# Patient Record
Sex: FEMALE | Race: WHITE | NOT HISPANIC OR LATINO | Employment: UNEMPLOYED | URBAN - METROPOLITAN AREA
[De-identification: names, ages, dates, MRNs, and addresses within clinical notes are randomized per-mention and may not be internally consistent; named-entity substitution may affect disease eponyms.]

---

## 2017-01-13 ENCOUNTER — ALLSCRIPTS OFFICE VISIT (OUTPATIENT)
Dept: OTHER | Facility: OTHER | Age: 37
End: 2017-01-13

## 2017-01-18 LAB
HPV 18 (HISTORICAL): NOT DETECTED
HPV HIGH RISK 16/18 (HISTORICAL): NOT DETECTED
HPV16 (HISTORICAL): NOT DETECTED
PAP (HISTORICAL): NORMAL

## 2017-02-10 ENCOUNTER — OFFICE VISIT (OUTPATIENT)
Dept: URGENT CARE | Facility: MEDICAL CENTER | Age: 37
End: 2017-02-10
Payer: COMMERCIAL

## 2017-02-10 PROCEDURE — S9083 URGENT CARE CENTER GLOBAL: HCPCS

## 2017-02-10 PROCEDURE — G0382 LEV 3 HOSP TYPE B ED VISIT: HCPCS

## 2017-12-11 ENCOUNTER — APPOINTMENT (OUTPATIENT)
Dept: LAB | Facility: MEDICAL CENTER | Age: 37
End: 2017-12-11
Attending: PHYSICIAN ASSISTANT
Payer: COMMERCIAL

## 2017-12-11 ENCOUNTER — OFFICE VISIT (OUTPATIENT)
Dept: URGENT CARE | Facility: MEDICAL CENTER | Age: 37
End: 2017-12-11
Payer: COMMERCIAL

## 2017-12-11 DIAGNOSIS — J02.9 ACUTE PHARYNGITIS: ICD-10-CM

## 2017-12-11 LAB — S PYO AG THROAT QL: NEGATIVE

## 2017-12-11 PROCEDURE — 87070 CULTURE OTHR SPECIMN AEROBIC: CPT

## 2017-12-11 PROCEDURE — 99213 OFFICE O/P EST LOW 20 MIN: CPT

## 2017-12-12 NOTE — PROGRESS NOTES
Assessment    1  Sore throat (462) (J02 9)    Plan  Sore throat    · (1) THROAT CULTURE (CULTURE, UPPER RESPIRATORY); Status:Active; Requestedfor:[id number];    · Rapid StrepA- POC; Source:Throat; Status:Complete;   Done: [id number] 10:00AM  May be viral infection as negative strep here  We will check a throat culture  Continue Tylenol and Motrin for fever and sore throat  She can use Sudafed and Mucinex D over-the-counter for congestion  Chief Complaint    1  Sore Throat  Chief Complaint Free Text Note Form: Congestion last week   and sore throat started over weekend  History of Present Illness  HPI: 40 yof presents with cough x1 week and newly developed sore throat  Her son is sick and recently received a negative result for his throat culture  She developed a fever last night that peaked at 101  She has been taking Mucinex to manage her cough with some relief  The cough is productive of yellow mucus  Denies n/v/d, headache, sinus pressure or otalgia  No history of asthma, allergies or smoking  Hospital Based Practices Required Assessment:  Pain Assessment  the patient states they have pain  The pain is located in the throat  (on a scale of 0 to 10, the patient rates the pain at 8 )  Abuse And Domestic Violence Screen   Yes, the patient is safe at home  -- The patient states no one is hurting them  Depression And Suicide Screen  No, the patient has not had thoughts of hurting themself  No, the patient has not felt depressed in the past 7 days  Primary Language is  English  Readiness To Learn: Receptive  Barriers To Learning: none  Preferred Learning: verbal  Education Completed: disease/condition-- and-- treatment/procedure  Teaching Method: verbal  Person Taught: patient  Evaluation Of Learning: verbalized/demonstrated understanding      Active Problems  1  Acute maxillary sinusitis (461 0) (J01 00)   2  DUB (dysfunctional uterine bleeding) (626 8) (N93 8)   3  Dysmenorrhea (625 3) (N94 6)   4  Encounter for gynecological examination with abnormal finding (V72 31) (Z01 411)   5  Encounter for routine gynecological examination ( 31) (Z01 419)   6  Vertigo (780 4) (R42)    Past Medical History  1  Acute bronchitis (466 0) (J20 9)   2  History of Previous Pregnancies Resulted In 1  Living Children   3  History of Previously Pregnant With 1  Normal Vaginal Deliveries   4  History of Summary Of Previous Pregnancies  1  (Total No )    Family History  Mother    1  No pertinent family history  Father    2  No pertinent family history  Family History    3  Family history of Diabetes Mellitus (V18 0)   4  Family history of Heart Disease (V17 49)    Social History   · Being A Social Drinker   · Daily Coffee Consumption (___ Cups/Day)   · Exercising Regularly   · Never A Smoker    Surgical History  1  Denied: History Of Prior Surgery    Allergies    1  No Known Drug Allergies    Vitals  Signs   Recorded: [id number] 09:40AM   Temperature: 99 4 F  Heart Rate: 78  Respiration: 18  Systolic: 059  Diastolic: 70  Height: 5 ft 2 in  Weight: 153 lb 9 6 oz  BMI Calculated: 28 09  BSA Calculated: 1 71  O2 Saturation: 100  Pain Scale: 8    Physical Exam   Constitutional  General appearance: No acute distress, well appearing and well nourished  Eyes  Conjunctiva and lids: No swelling, erythema or discharge  Pupils and irises: Equal, round and reactive to light  Ears, Nose, Mouth, and Throat  External inspection of ears and nose: Normal    Otoscopic examination: Tympanic membranes translucent with normal light reflex  Canals patent without erythema  Nasal mucosa, septum, and turbinates: Normal without edema or erythema  Oropharynx: Abnormal  -- mild erythema, no exudates  Pulmonary  Respiratory effort: No increased work of breathing or signs of respiratory distress  Auscultation of lungs: Clear to auscultation  Cardiovascular  Auscultation of heart: Normal rate and rhythm, normal S1 and S2, without murmurs  Results/Data  Rapid StrepA- POC [id number] 10:00AM Coit Braden     Test Name Result Flag Reference   Rapid Strep Negative         Future Appointments    Date/Time Provider Specialty Site   01/15/2018 09:30 AM Tyrone Marquez MD Obstetrics/Gynecology [facility]       Signatures   Electronically signed by : Fabrice Cr [facility]; Dec 11 2017 10:15AM EST                       (Author)    Electronically signed by : GUERA Santana ; Dec 11 2017  5:35PM EST                       (Co-author)

## 2017-12-13 LAB — BACTERIA THROAT CULT: NORMAL

## 2018-01-14 VITALS
BODY MASS INDEX: 30.18 KG/M2 | DIASTOLIC BLOOD PRESSURE: 66 MMHG | WEIGHT: 164 LBS | HEIGHT: 62 IN | SYSTOLIC BLOOD PRESSURE: 120 MMHG

## 2018-01-23 VITALS
TEMPERATURE: 99.4 F | OXYGEN SATURATION: 100 % | SYSTOLIC BLOOD PRESSURE: 110 MMHG | BODY MASS INDEX: 28.26 KG/M2 | DIASTOLIC BLOOD PRESSURE: 70 MMHG | RESPIRATION RATE: 18 BRPM | HEART RATE: 78 BPM | HEIGHT: 62 IN | WEIGHT: 153.6 LBS

## 2018-05-08 ENCOUNTER — OFFICE VISIT (OUTPATIENT)
Dept: OBGYN CLINIC | Age: 38
End: 2018-05-08
Payer: COMMERCIAL

## 2018-05-08 VITALS
HEIGHT: 64 IN | BODY MASS INDEX: 25.95 KG/M2 | WEIGHT: 152 LBS | SYSTOLIC BLOOD PRESSURE: 100 MMHG | DIASTOLIC BLOOD PRESSURE: 72 MMHG

## 2018-05-08 DIAGNOSIS — Z01.419 ENCOUNTER FOR GYNECOLOGICAL EXAMINATION: Primary | ICD-10-CM

## 2018-05-08 DIAGNOSIS — N92.0 MENORRHAGIA WITH REGULAR CYCLE: ICD-10-CM

## 2018-05-08 DIAGNOSIS — R10.2 PELVIC PAIN: ICD-10-CM

## 2018-05-08 PROCEDURE — S0612 ANNUAL GYNECOLOGICAL EXAMINA: HCPCS | Performed by: OBSTETRICS & GYNECOLOGY

## 2018-05-08 NOTE — PROGRESS NOTES
Assessment/Plan:    Encounter for gynecological examination  Pap and HPV current  Declines contraception at this time      Menses are heavy with pelvic pain Right > Left - will order pelvic ultrasound, CBC, TSH  Contraception- considering vasectomy/tubal   Also discussed IUD-Mirena may help with menses       Diagnoses and all orders for this visit:    Encounter for gynecological examination    Pelvic pain  -     US pelvis complete w transvaginal; Future    Menorrhagia with regular cycle  -     CBC and differential; Future  -     TSH, 3rd generation; Future          Subjective:      Patient ID: Yann Ivey is a 40 y o  female  Patient is here for her yearly exam complaining of having heavier periods with heavy blood clots during cycles  Age of her period 16    lmp 5/3/18   Birth control none  Last pap 18 negative pap HPV negative (due )  Pt is not a smoker  Pt is a social drinker  Pt exercises daily  Menses have changed tam the last year  Heavy with clots and increase in pelvic pain - lower abdomen R>L  NO change in bowel or bladder  Is excercising regularly- cross fit      Gynecologic Exam   The patient's primary symptoms include pelvic pain (worse at end of menses)  The patient's pertinent negatives include no genital itching, genital lesions, genital odor, genital rash, vaginal bleeding or vaginal discharge  The current episode started more than 1 month ago  The problem occurs intermittently  The problem has been unchanged  The pain is moderate  Pertinent negatives include no abdominal pain, anorexia, back pain, chills, constipation, diarrhea, fever, flank pain, frequency, nausea, painful intercourse, sore throat, urgency or vomiting  Nothing aggravates the symptoms  She has tried nothing for the symptoms  The treatment provided no relief  She is sexually active  She uses nothing for contraception  Her menstrual history has been regular         The following portions of the patient's history were reviewed and updated as appropriate:   She  has no past medical history on file  She   Patient Active Problem List    Diagnosis Date Noted    Encounter for gynecological examination 05/08/2018     She  has no past surgical history on file  Her family history includes Diabetes in her family; Heart disease in her family; No Known Problems in her father and mother  She  reports that she has never smoked  She has never used smokeless tobacco  She reports that she drinks alcohol  She reports that she does not use drugs  No current outpatient prescriptions on file  No current facility-administered medications for this visit  No current outpatient prescriptions on file prior to visit  No current facility-administered medications on file prior to visit  She has No Known Allergies       Review of Systems   Constitutional: Negative for activity change, appetite change, chills, fatigue and fever  HENT: Negative for rhinorrhea, sneezing and sore throat  Eyes: Negative for visual disturbance  Respiratory: Negative for cough, shortness of breath and wheezing  Cardiovascular: Negative for chest pain, palpitations and leg swelling  Gastrointestinal: Negative for abdominal distention, abdominal pain, anorexia, constipation, diarrhea, nausea and vomiting  Genitourinary: Positive for pelvic pain (worse at end of menses)  Negative for difficulty urinating, flank pain, frequency, urgency and vaginal discharge  Musculoskeletal: Negative for back pain  Neurological: Negative for syncope and light-headedness  Objective:      /72 (BP Location: Right arm, Patient Position: Sitting, Cuff Size: Standard)   Ht 5' 4" (1 626 m)   Wt 68 9 kg (152 lb)   LMP 05/03/2018 (Exact Date)   BMI 26 09 kg/m²          Physical Exam   Constitutional: She is oriented to person, place, and time     Genitourinary: Vagina normal and uterus normal  No breast swelling, tenderness, discharge or bleeding  There is no rash, tenderness, lesion or injury on the right labia  There is no rash, tenderness, lesion or injury on the left labia  Uterus is not deviated, not enlarged, not fixed and not tender  Cervix exhibits no motion tenderness, no discharge and no friability  Right adnexum displays no mass, no tenderness and no fullness  Left adnexum displays no mass, no tenderness and no fullness  No tenderness or bleeding in the vagina  No vaginal discharge found  Neurological: She is alert and oriented to person, place, and time

## 2018-05-08 NOTE — ASSESSMENT & PLAN NOTE
Pap and HPV current  Declines contraception at this time      Menses are heavy with pelvic pain Right > Left - will order pelvic ultrasound, CBC, TSH      Contraception- considering vasectomy/tubal   Also discussed IUD-Mirena may help with menses

## 2018-05-08 NOTE — PATIENT INSTRUCTIONS
Tubal Ligation   WHAT YOU NEED TO KNOW:   What do I need to know about a tubal ligation? A tubal ligation is surgery to close your fallopian tubes to prevent pregnancy  How do I prepare for a tubal ligation? Your healthcare provider will talk to you about how to prepare for surgery  He may tell you not to eat or drink anything within 8 hours before your surgery  He will tell you what medicines to take or not take on the day of your surgery  Arrange for someone to drive you home and stay with you after surgery  What will happen during a tubal ligation? · You may be given general anesthesia to keep you asleep and free from pain during surgery  You may instead be given regional anesthesia or local anesthesia  Regional anesthesia numbs the lower part of your body  Local anesthesia numbs the surgery area  With local anesthesia, you may still feel pressure or pushing during surgery, but you should not feel any pain  You may have a minilaparotomy or laparoscopic tubal ligation  During a minilaparotomy, your surgeon will make a small incision in your lower abdomen  · During laparoscopic tubal ligation, your surgeon will make one or more small incisions in your abdomen  A laparoscope and other instruments will be put into your abdomen through the small incisions  The laparoscope is a long metal tube with a light and camera on the end  Your abdomen will be filled with a gas  This allows your surgeon to see inside your abdomen more clearly  Your fallopian tubes will be cut and closed with thread  Your healthcare provider may instead seal your tubes with heat, or with a clip or ring  After the surgery is done, your incisions are closed with stitches or staples  The incisions may be covered with a bandage  What will happen after a tubal ligation? You will have abdominal pain and cramps for the first few days after surgery  You may feel dizzy, nauseated, bloated, or have gas   You may also have pain in your shoulders or near your ribs if gas was put in your abdomen  What are the risks of a tubal ligation? You may bleed more than expected or get an infection  Blood vessels or organs such as your bowel or bladder could be injured during surgery  Although pregnancy is unlikely after a tubal ligation, there is still a small chance that you may get pregnant  If pregnancy does occur, there is an increased risk for an ectopic pregnancy (tubal pregnancy)  You may get a blood clot in your leg or arm  This may become life-threatening  CARE AGREEMENT:   You have the right to help plan your care  Learn about your health condition and how it may be treated  Discuss treatment options with your caregivers to decide what care you want to receive  You always have the right to refuse treatment  The above information is an  only  It is not intended as medical advice for individual conditions or treatments  Talk to your doctor, nurse or pharmacist before following any medical regimen to see if it is safe and effective for you  © 2017 [employer] Information is for End User's use only and may not be sold, redistributed or otherwise used for commercial purposes  All illustrations and images included in CareNotes® are the copyrighted property of A [employer]  or [employer]

## 2018-05-14 ENCOUNTER — HOSPITAL ENCOUNTER (OUTPATIENT)
Dept: RADIOLOGY | Facility: MEDICAL CENTER | Age: 38
Discharge: HOME | End: 2018-05-14
Payer: COMMERCIAL

## 2018-05-14 ENCOUNTER — APPOINTMENT (OUTPATIENT)
Dept: LAB | Facility: MEDICAL CENTER | Age: 38
End: 2018-05-14
Payer: COMMERCIAL

## 2018-05-14 DIAGNOSIS — R10.2 PELVIC PAIN: ICD-10-CM

## 2018-05-14 DIAGNOSIS — N92.0 MENORRHAGIA WITH REGULAR CYCLE: ICD-10-CM

## 2018-05-14 LAB
BASOPHILS # BLD AUTO: 0.04 THOUSANDS/ΜL (ref 0–0.1)
BASOPHILS NFR BLD AUTO: 1 % (ref 0–1)
EOSINOPHIL # BLD AUTO: 0.18 THOUSAND/ΜL (ref 0–0.61)
EOSINOPHIL NFR BLD AUTO: 3 % (ref 0–6)
ERYTHROCYTE [DISTWIDTH] IN BLOOD BY AUTOMATED COUNT: 13.3 % (ref 11.6–15.1)
HCT VFR BLD AUTO: 41 % (ref 34.8–46.1)
HGB BLD-MCNC: 13.7 G/DL (ref 11.5–15.4)
LYMPHOCYTES # BLD AUTO: 2.66 THOUSANDS/ΜL (ref 0.6–4.47)
LYMPHOCYTES NFR BLD AUTO: 45 % (ref 14–44)
MCH RBC QN AUTO: 31.7 PG (ref 26.8–34.3)
MCHC RBC AUTO-ENTMCNC: 33.4 G/DL (ref 31.4–37.4)
MCV RBC AUTO: 95 FL (ref 82–98)
MONOCYTES # BLD AUTO: 0.47 THOUSAND/ΜL (ref 0.17–1.22)
MONOCYTES NFR BLD AUTO: 8 % (ref 4–12)
NEUTROPHILS # BLD AUTO: 2.55 THOUSANDS/ΜL (ref 1.85–7.62)
NEUTS SEG NFR BLD AUTO: 43 % (ref 43–75)
NRBC BLD AUTO-RTO: 0 /100 WBCS
PLATELET # BLD AUTO: 288 THOUSANDS/UL (ref 149–390)
PMV BLD AUTO: 11.2 FL (ref 8.9–12.7)
RBC # BLD AUTO: 4.32 MILLION/UL (ref 3.81–5.12)
TSH SERPL DL<=0.05 MIU/L-ACNC: 2.37 UIU/ML (ref 0.36–3.74)
WBC # BLD AUTO: 5.92 THOUSAND/UL (ref 4.31–10.16)

## 2018-05-14 PROCEDURE — 84443 ASSAY THYROID STIM HORMONE: CPT

## 2018-05-14 PROCEDURE — 76856 US EXAM PELVIC COMPLETE: CPT

## 2018-05-14 PROCEDURE — 76830 TRANSVAGINAL US NON-OB: CPT

## 2018-05-14 PROCEDURE — 85025 COMPLETE CBC W/AUTO DIFF WBC: CPT

## 2018-05-14 PROCEDURE — 36415 COLL VENOUS BLD VENIPUNCTURE: CPT

## 2018-05-15 ENCOUNTER — TELEPHONE (OUTPATIENT)
Dept: OBGYN CLINIC | Facility: CLINIC | Age: 38
End: 2018-05-15

## 2018-05-15 NOTE — TELEPHONE ENCOUNTER
----- Message from Astrid Lazo MD sent at 5/14/2018  4:15 PM EDT -----  Please call Yesenia Rodriguez her labs are wnl  Will await the ultrasound results

## 2018-05-16 ENCOUNTER — TELEPHONE (OUTPATIENT)
Dept: OBGYN CLINIC | Facility: CLINIC | Age: 38
End: 2018-05-16

## 2018-05-16 NOTE — TELEPHONE ENCOUNTER
Spoke with Pt via phone call today  Pt informed that her recent pelvic ultrasound result was normal per Dr Jaki Rodrigez review  Pt further informed that she is a candidate for OCP, Mirena IUD or tubal, if interested can schedule appointment for birth control consultation, otherwise annual visit next year  Pt states she will call back office to schedule [facility] consultation  Reiterated to Pt that if she has any questions/concerns, to contact office

## 2018-05-16 NOTE — TELEPHONE ENCOUNTER
----- Message from Hadley Orellana MD sent at 5/16/2018  4:36 PM EDT -----  Please call Diego Doherty- her ultrasound is normal    She is a candidate for oral contraception, Mirena IUD or tubal if interested  If she is please arrange    Otherwise, annual visit next year

## 2019-01-06 ENCOUNTER — OFFICE VISIT (OUTPATIENT)
Dept: URGENT CARE | Facility: MEDICAL CENTER | Age: 39
End: 2019-01-06
Payer: COMMERCIAL

## 2019-01-06 VITALS
RESPIRATION RATE: 18 BRPM | HEART RATE: 72 BPM | OXYGEN SATURATION: 99 % | HEIGHT: 64 IN | TEMPERATURE: 97.7 F | WEIGHT: 155.2 LBS | DIASTOLIC BLOOD PRESSURE: 70 MMHG | SYSTOLIC BLOOD PRESSURE: 110 MMHG | BODY MASS INDEX: 26.5 KG/M2

## 2019-01-06 DIAGNOSIS — R05.9 COUGH: Primary | ICD-10-CM

## 2019-01-06 PROCEDURE — 99213 OFFICE O/P EST LOW 20 MIN: CPT | Performed by: PHYSICIAN ASSISTANT

## 2019-01-06 RX ORDER — BROMPHENIRAMINE MALEATE, PSEUDOEPHEDRINE HYDROCHLORIDE, AND DEXTROMETHORPHAN HYDROBROMIDE 2; 30; 10 MG/5ML; MG/5ML; MG/5ML
5 SYRUP ORAL 4 TIMES DAILY PRN
Qty: 120 ML | Refills: 0 | Status: SHIPPED | OUTPATIENT
Start: 2019-01-06 | End: 2019-01-11

## 2019-01-06 RX ORDER — AZITHROMYCIN 250 MG/1
TABLET, FILM COATED ORAL
Qty: 6 TABLET | Refills: 0 | Status: SHIPPED | OUTPATIENT
Start: 2019-01-06 | End: 2019-01-10

## 2019-01-06 RX ORDER — ALBUTEROL SULFATE 90 UG/1
2 AEROSOL, METERED RESPIRATORY (INHALATION) EVERY 6 HOURS PRN
Qty: 8.5 G | Refills: 0 | Status: SHIPPED | OUTPATIENT
Start: 2019-01-06 | End: 2019-08-12

## 2019-01-06 NOTE — PATIENT INSTRUCTIONS
Cough   zithromax as directed  proventil 2 puffs every 6 hours as needed  Follow up with PCP in 3-5 days  Proceed to  ER if symptoms worsen  Acute Cough   WHAT YOU NEED TO KNOW:   An acute cough can last up to 3 weeks  Common causes of an acute cough include a cold, allergies, or a lung infection  DISCHARGE INSTRUCTIONS:   Return to the emergency department if:   · You have trouble breathing or feel short of breath  · You cough up blood, or you see blood in your mucus  · You faint or feel weak or dizzy  · You have chest pain when you cough or take a deep breath  · You have new wheezing  Contact your healthcare provider if:   · You have a fever  · Your cough lasts longer than 4 weeks  · Your symptoms do not improve with treatment  · You have questions or concerns about your condition or care  Medicines:   · Medicines  may be needed to stop the cough, decrease swelling in your airways, or help open your airways  Medicine may also be given to help you cough up mucus  Ask your healthcare provider what over-the-counter medicines you can take  If you have an infection caused by bacteria, you may need antibiotics  · Take your medicine as directed  Contact your healthcare provider if you think your medicine is not helping or if you have side effects  Tell him or her if you are allergic to any medicine  Keep a list of the medicines, vitamins, and herbs you take  Include the amounts, and when and why you take them  Bring the list or the pill bottles to follow-up visits  Carry your medicine list with you in case of an emergency  Manage your symptoms:   · Do not smoke and stay away from others who smoke  Nicotine and other chemicals in cigarettes and cigars can cause lung damage and make your cough worse  Ask your healthcare provider for information if you currently smoke and need help to quit  E-cigarettes or smokeless tobacco still contain nicotine   Talk to your healthcare provider before you use these products  · Drink extra liquids as directed  Liquids will help thin and loosen mucus so you can cough it up  Liquids will also help prevent dehydration  Examples of good liquids to drink include water, fruit juice, and broth  Do not drink liquids that contain caffeine  Caffeine can increase your risk for dehydration  Ask your healthcare provider how much liquid to drink each day  · Rest as directed  Do not do activities that make your cough worse, such as exercise  · Use a humidifier or vaporizer  Use a cool mist humidifier or a vaporizer to increase air moisture in your home  This may make it easier for you to breathe and help decrease your cough  · Eat 2 to 5 mL of honey 2 times each day  Honey can help thin mucus and decrease your cough  · Use cough drops or lozenges  These can help decrease throat irritation and your cough  Follow up with your healthcare provider as directed:  Write down your questions so you remember to ask them during your visits  © 2017 [employer]  Information is for End User's use only and may not be sold, redistributed or otherwise used for commercial purposes  All illustrations and images included in CareNotes® are the copyrighted property of [employer]  or [employer]  The above information is an  only  It is not intended as medical advice for individual conditions or treatments  Talk to your doctor, nurse or pharmacist before following any medical regimen to see if it is safe and effective for you

## 2019-01-06 NOTE — PROGRESS NOTES
[facility]        NAME: Carrie [place] is a 41 y o  female  : 1980    MRN: [id number]  DATE: 2019  TIME: 5:20 PM    Assessment and Plan   Cough [R05]  1  Cough  azithromycin (ZITHROMAX) 250 mg tablet    brompheniramine-pseudoephedrine-DM 30-2-10 MG/5ML syrup    albuterol (PROAIR HFA) 90 mcg/act inhaler         Patient Instructions     Cough   zithromax as directed  proventil 2 puffs every 6 hours as needed  Follow up with PCP in 3-5 days  Proceed to  ER if symptoms worsen  Chief Complaint     Chief Complaint   Patient presents with    Cough     Pt  C/O cough and cold for the past week   Cold Like Symptoms         History of Present Illness       42 y/o female c/o cough productive of yellow sputum x 7 days  States she used over the counter medication with no relief  Denies chest pain, SOB, fever, chills        Review of Systems   Review of Systems   Constitutional: Negative for activity change, appetite change, chills, diaphoresis, fatigue and fever  HENT: Positive for congestion, ear pain, rhinorrhea and sore throat  Negative for ear discharge, facial swelling, hearing loss, mouth sores, nosebleeds, postnasal drip, sinus pain, sinus pressure, sneezing and voice change  Respiratory: Positive for cough  Negative for apnea, choking, chest tightness, shortness of breath, wheezing and stridor  Cardiovascular: Negative            Current Medications       Current Outpatient Prescriptions:     albuterol (PROAIR HFA) 90 mcg/act inhaler, Inhale 2 puffs every 6 (six) hours as needed for wheezing, Disp: 8 5 g, Rfl: 0    azithromycin (ZITHROMAX) 250 mg tablet, Take 2 tablets today then 1 tablet daily x 4 days, Disp: 6 tablet, Rfl: 0    brompheniramine-pseudoephedrine-DM 30-2-10 MG/5ML syrup, Take 5 mL by mouth 4 (four) times a day as needed for cough for up to 5 days, Disp: 120 mL, Rfl: 0    Current Allergies     Allergies as of 2019    (No Known Allergies) The following portions of the patient's history were reviewed and updated as appropriate: allergies, current medications, past family history, past medical history, past social history, past surgical history and problem list      No past medical history on file  No past surgical history on file  Family History   Problem Relation Age of Onset    No Known Problems Mother     No Known Problems Father     Diabetes Family     Heart disease Family          Medications have been verified  Objective   /70   Pulse 72   Temp 97 7 °F (36 5 °C) (Temporal)   Resp 18   Ht 5' 4" (1 626 m)   Wt 70 4 kg (155 lb 3 2 oz)   SpO2 99%   BMI 26 64 kg/m²        Physical Exam     Physical Exam   Constitutional: She appears well-developed and well-nourished  No distress  HENT:   Head: Normocephalic and atraumatic  Right Ear: Hearing, tympanic membrane, external ear and ear canal normal    Left Ear: Hearing, tympanic membrane, external ear and ear canal normal    Nose: Rhinorrhea present  Mouth/Throat: Uvula is midline, oropharynx is clear and moist and mucous membranes are normal    Neck: Normal range of motion  Neck supple  Cardiovascular: Normal rate, regular rhythm, normal heart sounds and intact distal pulses  Pulmonary/Chest: Effort normal and breath sounds normal    Lymphadenopathy:     She has cervical adenopathy  Skin: She is not diaphoretic

## 2019-02-25 ENCOUNTER — DOCTOR'S OFFICE (OUTPATIENT)
Dept: URBAN - METROPOLITAN AREA CLINIC 137 | Facility: CLINIC | Age: 39
Setting detail: OPHTHALMOLOGY
End: 2019-02-25
Payer: COMMERCIAL

## 2019-02-25 DIAGNOSIS — H16.202: ICD-10-CM

## 2019-02-25 PROCEDURE — 99203 OFFICE O/P NEW LOW 30 MIN: CPT | Performed by: OPTOMETRIST

## 2019-02-25 ASSESSMENT — REFRACTION_CURRENTRX
OD_OVR_VA: 20/
OS_OVR_VA: 20/

## 2019-02-25 ASSESSMENT — REFRACTION_MANIFEST
OD_VA1: 20/
OU_VA: 20/
OS_VA3: 20/
OD_VA2: 20/
OS_VA1: 20/
OS_VA3: 20/
OS_VA2: 20/
OS_VA2: 20/
OD_VA3: 20/
OS_VA1: 20/
OD_VA3: 20/
OD_VA2: 20/
OU_VA: 20/
OD_VA1: 20/

## 2019-02-25 ASSESSMENT — CORNEAL EDEMA CLINICAL DESCRIPTION
OD_CORNEALEDEMA: T
OS_CORNEALEDEMA: 1+

## 2019-02-25 ASSESSMENT — CONFRONTATIONAL VISUAL FIELD TEST (CVF)
OS_FINDINGS: FULL
OD_FINDINGS: FULL

## 2019-02-25 ASSESSMENT — VISUAL ACUITY
OS_BCVA: 20/20-2
OD_BCVA: 20/30

## 2019-03-05 ENCOUNTER — DOCTOR'S OFFICE (OUTPATIENT)
Dept: URBAN - METROPOLITAN AREA CLINIC 137 | Facility: CLINIC | Age: 39
Setting detail: OPHTHALMOLOGY
End: 2019-03-05
Payer: COMMERCIAL

## 2019-03-05 DIAGNOSIS — H16.202: ICD-10-CM

## 2019-03-05 PROCEDURE — 99213 OFFICE O/P EST LOW 20 MIN: CPT | Performed by: OPTOMETRIST

## 2019-03-05 ASSESSMENT — REFRACTION_MANIFEST
OS_VA2: 20/
OD_VA1: 20/
OD_VA1: 20/
OU_VA: 20/
OS_VA1: 20/
OS_VA1: 20/
OD_VA2: 20/
OU_VA: 20/
OS_VA3: 20/
OD_VA2: 20/
OD_VA3: 20/
OS_VA3: 20/
OS_VA2: 20/
OD_VA3: 20/

## 2019-03-05 ASSESSMENT — CORNEAL EDEMA CLINICAL DESCRIPTION
OD_CORNEALEDEMA: T
OS_CORNEALEDEMA: 1+

## 2019-03-05 ASSESSMENT — REFRACTION_CURRENTRX
OS_OVR_VA: 20/
OD_OVR_VA: 20/
OS_OVR_VA: 20/
OD_OVR_VA: 20/
OD_OVR_VA: 20/
OS_OVR_VA: 20/

## 2019-03-05 ASSESSMENT — CONFRONTATIONAL VISUAL FIELD TEST (CVF)
OD_FINDINGS: FULL
OS_FINDINGS: FULL

## 2019-03-05 ASSESSMENT — VISUAL ACUITY
OS_BCVA: 20/25-1
OD_BCVA: 20/50-1

## 2019-04-02 ENCOUNTER — DOCTOR'S OFFICE (OUTPATIENT)
Dept: URBAN - METROPOLITAN AREA CLINIC 137 | Facility: CLINIC | Age: 39
Setting detail: OPHTHALMOLOGY
End: 2019-04-02
Payer: COMMERCIAL

## 2019-04-02 DIAGNOSIS — H52.223: ICD-10-CM

## 2019-04-02 DIAGNOSIS — H52.13: ICD-10-CM

## 2019-04-02 PROCEDURE — SELFPAYVIS VISION VISIT SELF PAY: Performed by: OPTOMETRIST

## 2019-04-02 PROCEDURE — 92310 CONTACT LENS FITTING OU: CPT | Performed by: OPTOMETRIST

## 2019-04-02 ASSESSMENT — REFRACTION_AUTOREFRACTION
OD_SPHERE: -3.00
OS_AXIS: 12
OS_CYLINDER: -0.75
OS_SPHERE: -2.75
OD_CYLINDER: -0.75
OD_AXIS: 168

## 2019-04-02 ASSESSMENT — CONFRONTATIONAL VISUAL FIELD TEST (CVF)
OS_FINDINGS: FULL
OD_FINDINGS: FULL

## 2019-04-02 ASSESSMENT — REFRACTION_MANIFEST
OU_VA: 20/
OS_VA2: 20/
OD_VA3: 20/
OU_VA: 20/
OS_VA1: 20/
OS_CYLINDER: -0.75
OD_VA1: 20/
OD_VA1: 20/20
OS_SPHERE: -3.00
OD_VA2: 20/
OD_VA3: 20/
OS_VA1: 20/20
OD_SPHERE: -2.75
OS_VA2: 20/
OS_AXIS: 5
OS_VA3: 20/
OD_AXIS: 180
OD_VA2: 20/
OS_VA3: 20/
OD_CYLINDER: -1.00

## 2019-04-02 ASSESSMENT — CORNEAL EDEMA CLINICAL DESCRIPTION
OS_CORNEALEDEMA: 1+
OD_CORNEALEDEMA: T

## 2019-04-02 ASSESSMENT — REFRACTION_CURRENTRX
OS_OVR_VA: 20/
OD_CYLINDER: SPH
OD_OVR_VA: 20/
OS_OVR_VA: 20/
OS_CYLINDER: SPH
OD_SPHERE: -4.00
OS_SPHERE: -4.00
OS_OVR_VA: 20/

## 2019-04-02 ASSESSMENT — SPHEQUIV_DERIVED
OS_SPHEQUIV: -3.375
OS_SPHEQUIV: -3.125
OD_SPHEQUIV: -3.375
OD_SPHEQUIV: -3.25

## 2019-04-02 ASSESSMENT — VISUAL ACUITY
OD_BCVA: 20/25
OS_BCVA: 20/25

## 2019-04-15 ENCOUNTER — OPTICAL OFFICE (OUTPATIENT)
Dept: URBAN - METROPOLITAN AREA CLINIC 146 | Facility: CLINIC | Age: 39
Setting detail: OPHTHALMOLOGY
End: 2019-04-15

## 2019-04-15 DIAGNOSIS — H52.223: ICD-10-CM

## 2019-04-15 PROCEDURE — S0500 DISPOS CONT LENS: HCPCS | Performed by: OPTOMETRIST

## 2019-05-29 ENCOUNTER — ANNUAL EXAM (OUTPATIENT)
Dept: OBGYN CLINIC | Facility: CLINIC | Age: 39
End: 2019-05-29
Payer: COMMERCIAL

## 2019-05-29 VITALS
WEIGHT: 146.13 LBS | BODY MASS INDEX: 24.95 KG/M2 | HEIGHT: 64 IN | DIASTOLIC BLOOD PRESSURE: 72 MMHG | SYSTOLIC BLOOD PRESSURE: 102 MMHG

## 2019-05-29 DIAGNOSIS — R10.2 PELVIC PAIN: ICD-10-CM

## 2019-05-29 DIAGNOSIS — Z01.419 ENCOUNTER FOR GYNECOLOGICAL EXAMINATION: Primary | ICD-10-CM

## 2019-05-29 PROCEDURE — S0612 ANNUAL GYNECOLOGICAL EXAMINA: HCPCS | Performed by: OBSTETRICS & GYNECOLOGY

## 2019-07-18 ENCOUNTER — APPOINTMENT (OUTPATIENT)
Dept: LAB | Facility: MEDICAL CENTER | Age: 39
End: 2019-07-18
Payer: COMMERCIAL

## 2019-07-18 ENCOUNTER — HOSPITAL ENCOUNTER (OUTPATIENT)
Dept: RADIOLOGY | Facility: MEDICAL CENTER | Age: 39
Discharge: HOME | End: 2019-07-18
Payer: COMMERCIAL

## 2019-07-18 DIAGNOSIS — R10.2 PELVIC PAIN: ICD-10-CM

## 2019-07-18 LAB
BACTERIA UR QL AUTO: ABNORMAL /HPF
BILIRUB UR QL STRIP: NEGATIVE
CLARITY UR: CLEAR
COLOR UR: YELLOW
GLUCOSE UR STRIP-MCNC: NEGATIVE MG/DL
HGB UR QL STRIP.AUTO: NEGATIVE
HYALINE CASTS #/AREA URNS LPF: ABNORMAL /LPF
KETONES UR STRIP-MCNC: NEGATIVE MG/DL
LEUKOCYTE ESTERASE UR QL STRIP: NEGATIVE
NITRITE UR QL STRIP: NEGATIVE
NON-SQ EPI CELLS URNS QL MICRO: ABNORMAL /HPF
PH UR STRIP.AUTO: 7 [PH]
PROT UR STRIP-MCNC: NEGATIVE MG/DL
RBC #/AREA URNS AUTO: ABNORMAL /HPF
SP GR UR STRIP.AUTO: 1 (ref 1–1.03)
UROBILINOGEN UR QL STRIP.AUTO: 0.2 E.U./DL
WBC #/AREA URNS AUTO: ABNORMAL /HPF

## 2019-07-18 PROCEDURE — 76856 US EXAM PELVIC COMPLETE: CPT

## 2019-07-18 PROCEDURE — 81001 URINALYSIS AUTO W/SCOPE: CPT | Performed by: OBSTETRICS & GYNECOLOGY

## 2019-07-18 PROCEDURE — 76830 TRANSVAGINAL US NON-OB: CPT

## 2019-07-24 ENCOUNTER — TELEPHONE (OUTPATIENT)
Dept: OBGYN CLINIC | Facility: CLINIC | Age: 39
End: 2019-07-24

## 2019-07-24 NOTE — TELEPHONE ENCOUNTER
----- Message from Gary Esquivel DO sent at 7/23/2019  9:48 PM EDT -----  Results within normal limits  Please inform patient

## 2019-08-12 ENCOUNTER — APPOINTMENT (OUTPATIENT)
Dept: RADIOLOGY | Facility: MEDICAL CENTER | Age: 39
End: 2019-08-12
Payer: COMMERCIAL

## 2019-08-12 ENCOUNTER — OFFICE VISIT (OUTPATIENT)
Dept: OBGYN CLINIC | Facility: MEDICAL CENTER | Age: 39
End: 2019-08-12
Payer: COMMERCIAL

## 2019-08-12 VITALS
BODY MASS INDEX: 26.77 KG/M2 | WEIGHT: 156.8 LBS | HEIGHT: 64 IN | SYSTOLIC BLOOD PRESSURE: 138 MMHG | DIASTOLIC BLOOD PRESSURE: 91 MMHG | HEART RATE: 90 BPM

## 2019-08-12 DIAGNOSIS — S62.336A CLOSED DISPLACED FRACTURE OF NECK OF FIFTH METACARPAL BONE OF RIGHT HAND, INITIAL ENCOUNTER: ICD-10-CM

## 2019-08-12 DIAGNOSIS — S62.336A CLOSED DISPLACED FRACTURE OF NECK OF FIFTH METACARPAL BONE OF RIGHT HAND, INITIAL ENCOUNTER: Primary | ICD-10-CM

## 2019-08-12 PROCEDURE — 26605 TREAT METACARPAL FRACTURE: CPT | Performed by: ORTHOPAEDIC SURGERY

## 2019-08-12 PROCEDURE — 99203 OFFICE O/P NEW LOW 30 MIN: CPT | Performed by: ORTHOPAEDIC SURGERY

## 2019-08-12 PROCEDURE — 73120 X-RAY EXAM OF HAND: CPT

## 2019-08-12 NOTE — PROGRESS NOTES
CHIEF COMPLAINT:  Chief Complaint   Patient presents with    Right Hand - Pain       SUBJECTIVE:  Francisca Terry is a 41y o  year old  female who presents to the office today for evaluation of right hand pain  Patient states she had a trip and fall up her camper steps on Friday 8/9/19  Patient states she noted immediate pain and swelling to the fifth metacarpal  She presented to an urgent care the following day where x-rays were taken and she was provided with a splint and told to follow-up with orthopedics when she returned from vacation  Patient states she has been compliant with splint use  She denies any numbness or tingling  PAST MEDICAL HISTORY:  Past Medical History:   Diagnosis Date    No known health problems        PAST SURGICAL HISTORY:  Past Surgical History:   Procedure Laterality Date    NO PAST SURGERIES         FAMILY HISTORY:  Family History   Problem Relation Age of Onset    No Known Problems Mother     No Known Problems Father     Diabetes Family     Heart disease Family     Breast cancer Neg Hx     Colon cancer Neg Hx     Ovarian cancer Neg Hx        SOCIAL HISTORY:  Social History     Tobacco Use    Smoking status: Never Smoker    Smokeless tobacco: Never Used   Substance Use Topics    Alcohol use: Yes     Comment: Social     Drug use: No       MEDICATIONS:    Current Outpatient Medications:     Ibuprofen (ADVIL PO), Take by mouth, Disp: , Rfl:     ALLERGIES:  No Known Allergies    REVIEW OF SYSTEMS:  Review of Systems   Constitutional: Negative for chills and fever  HENT: Negative for drooling and sneezing  Eyes: Negative for redness  Respiratory: Negative for cough and wheezing  Gastrointestinal: Negative for nausea and vomiting  Musculoskeletal: Positive for arthralgias  Negative for joint swelling and myalgias  Neurological: Negative for weakness and numbness  Psychiatric/Behavioral: Negative for behavioral problems  The patient is not nervous/anxious  VITALS:  Vitals:    08/12/19 1504   BP: 138/91   Pulse: 90       LABS:  HgA1c: No results found for: HGBA1C  BMP: No results found for: GLUCOSE, CALCIUM, NA, K, CO2, CL, BUN, CREATININE    _____________________________________________________  PHYSICAL EXAMINATION:  General: well developed and well nourished, alert, oriented times 3 and appears comfortable  Psychiatric: Normal  HEENT: Trachea Midline, No torticollis  Pulmonary: No audible wheezing or respiratory distress   Skin: No Masses, No Lacerations  Neurovascular: Sensation Intact to the Median, Ulnar, Radial Nerve, Motor Intact to the Median, Ulnar, Radial Nerve and Pulses Intact    MUSCULOSKELETAL EXAMINATION:  Right hand  TTP fifth metacarpal  No wounds secondary to splint  Compartments soft  Brisk capillary refill     ___________________________________________________  STUDIES REVIEWED:  Images obtained on 8/10/18 of the right hand 3 views demonstrate an anglulated fifth metacarpal neck fracture  X-ray right hand post reduction performed in the office today demonstrates improved alignment fifth metacarpal neck fracture         PROCEDURES PERFORMED:  Fracture / Dislocation Treatment  Date/Time: 8/12/2019 3:25 PM  Performed by: Rachelle Echavarria MD  Authorized by: Rachelle Echavarria MD     Patient Location:  Clinic  Other Assisting Provider: No    Verbal consent obtained?: Yes    Consent given by:  Patient  Patient identity confirmed:  Verbally with patient  Injury location:  Hand  Location details:  Right hand  Injury type:  Fracture  Fracture type: fifth metacarpal    Neurovascular status: Neurovascularly intact    Local anesthesia used?: No    Manipulation performed?: Yes    Skin traction used?: No    Skeletal traction used?: No    Reduction successful?: Yes    Confirmation: Reduction confirmed by x-ray    Immobilization:  Cast  Splint type:  Ulnar gutter  Supplies used:  Cotton padding and fiberglass  Neurovascular status: Neurovascularly intact Patient tolerance:  Patient tolerated the procedure well with no immediate complications           _____________________________________________________  ASSESSMENT/PLAN:    Angulated fifth metacarpal neck fracture sustained on 8/9/19  * ulnar gutter cast and closed reduction performed in the office today  * patient advised no weightbearing with RUE  * cast care instructions provided  * patient will follow up in 3 weeks for repeat evaluation, cast removal, and repeat x-ray    Follow Up:  Return in about 3 weeks (around 9/2/2019) for Recheck  To Do Next Visit:  Re-evaluation of current issue and X-rays of the  right  hand    General Discussions:  Fracture - Nonoperative Care: The physiology of a fractured bone was discussed with the patient today  With non-displaced or minimally displaced fractures, conservative treatment such as casting or splinting often results in a functional recovery  Typically, these fractures are immobilized in either a cast or splint depending on the pattern  Radiographs are typically taken at intervals throughout the fracture healing to ensure that reduction or alignment is not lost   If the fracture loses its alignment, surgical intervention may be required to stabilize it  Medical conditions such as diabetes, osteoporosis, vitamin D deficiency, and a history of or exposure to smoking may delay or prevent fracture healing  Options between cast/splint immobilization and surgical treatment were offered and the risks and benefits of both were discussed           Scribe Attestation    I,:   Enriqueta Mueller MA am acting as a scribe while in the presence of the attending physician :        I,:   Amparo Mays MD personally performed the services described in this documentation    as scribed in my presence :

## 2019-09-03 ENCOUNTER — OFFICE VISIT (OUTPATIENT)
Dept: OCCUPATIONAL THERAPY | Facility: CLINIC | Age: 39
End: 2019-09-03
Payer: COMMERCIAL

## 2019-09-03 ENCOUNTER — APPOINTMENT (OUTPATIENT)
Dept: RADIOLOGY | Facility: CLINIC | Age: 39
End: 2019-09-03
Payer: COMMERCIAL

## 2019-09-03 ENCOUNTER — OFFICE VISIT (OUTPATIENT)
Dept: OBGYN CLINIC | Facility: CLINIC | Age: 39
End: 2019-09-03

## 2019-09-03 VITALS
HEIGHT: 64 IN | DIASTOLIC BLOOD PRESSURE: 77 MMHG | WEIGHT: 156.8 LBS | HEART RATE: 66 BPM | SYSTOLIC BLOOD PRESSURE: 124 MMHG | BODY MASS INDEX: 26.77 KG/M2

## 2019-09-03 DIAGNOSIS — S62.336D CLOSED DISPLACED FRACTURE OF NECK OF FIFTH METACARPAL BONE OF RIGHT HAND WITH ROUTINE HEALING, SUBSEQUENT ENCOUNTER: Primary | ICD-10-CM

## 2019-09-03 DIAGNOSIS — S62.336D CLOSED DISPLACED FRACTURE OF NECK OF FIFTH METACARPAL BONE OF RIGHT HAND WITH ROUTINE HEALING, SUBSEQUENT ENCOUNTER: ICD-10-CM

## 2019-09-03 PROCEDURE — L3913 HFO W/O JOINTS CF: HCPCS

## 2019-09-03 PROCEDURE — 99024 POSTOP FOLLOW-UP VISIT: CPT | Performed by: ORTHOPAEDIC SURGERY

## 2019-09-03 PROCEDURE — 73130 X-RAY EXAM OF HAND: CPT

## 2019-09-03 NOTE — PROGRESS NOTES
Orthosis    Diagnosis:   1  Closed displaced fracture of neck of fifth metacarpal bone of right hand with routine healing, subsequent encounter  Ambulatory referral to PT/OT hand therapy     Indication: Fracture    Location: Right  hand, ring finger and small finger  Supplies: Custom Fit Orthotic and Skin coverage   Orthosis type: Ulnar Gutter Hand Based  Wearing Schedule: Remove for HEP  Describe Position: MCPs in flexion (approx 75-80 deg), IPs free    Precautions: Fracture and Universal (skin contact/breakdown)    Patient or Caregiver expresses understanding of wearing Schedule and Precautions? Yes  Patient or Caregiver able to don/doff orthotic independently? Yes    Written orders provided to patient?  Yes  Orders Obtained: Written  Orders Obtained from: Dr Ridge Valladares    Return for evaluation and treatment Yes Pt will be seen at the [facility] office

## 2019-09-03 NOTE — PROGRESS NOTES
CHIEF COMPLAINT:  Chief Complaint   Patient presents with    Right Hand - Follow-up       SUBJECTIVE:  Aj López is a 41y o  year old  female who presents to the office today for a follow up regarding a right 5th metacarpal fracture that occurred on 08/09/19  Farhan Lynn was seen in the office on 08/12/19 where she was placed into a ulnar gutter cast and a closed reduction was performed  Farhan Lynn denies any significant pain today  PAST MEDICAL HISTORY:  Past Medical History:   Diagnosis Date    No known health problems        PAST SURGICAL HISTORY:  Past Surgical History:   Procedure Laterality Date    NO PAST SURGERIES         FAMILY HISTORY:  Family History   Problem Relation Age of Onset    No Known Problems Mother     No Known Problems Father     Diabetes Family     Heart disease Family     Breast cancer Neg Hx     Colon cancer Neg Hx     Ovarian cancer Neg Hx        SOCIAL HISTORY:  Social History     Tobacco Use    Smoking status: Never Smoker    Smokeless tobacco: Never Used   Substance Use Topics    Alcohol use: Yes     Comment: Social     Drug use: No       MEDICATIONS:    Current Outpatient Medications:     Ibuprofen (ADVIL PO), Take by mouth, Disp: , Rfl:     ALLERGIES:  No Known Allergies    REVIEW OF SYSTEMS:  Review of Systems   Constitutional: Negative for chills, fever and unexpected weight change  HENT: Negative for hearing loss, nosebleeds and sore throat  Eyes: Negative for pain, redness and visual disturbance  Respiratory: Negative for cough, shortness of breath and wheezing  Cardiovascular: Negative for chest pain, palpitations and leg swelling  Gastrointestinal: Negative for abdominal pain, nausea and vomiting  Endocrine: Negative for polydipsia and polyuria  Genitourinary: Negative for difficulty urinating and hematuria  Musculoskeletal: Negative for arthralgias, joint swelling and myalgias  Skin: Negative for rash and wound     Neurological: Negative for dizziness, numbness and headaches  Psychiatric/Behavioral: Negative for decreased concentration, dysphoric mood and suicidal ideas  The patient is not nervous/anxious  VITALS:  Vitals:    09/03/19 1029   BP: 124/77   Pulse: 66       LABS:  HgA1c: No results found for: HGBA1C  BMP: No results found for: GLUCOSE, CALCIUM, NA, K, CO2, CL, BUN, CREATININE    _____________________________________________________  PHYSICAL EXAMINATION:  General: well developed and well nourished, alert, oriented times 3 and appears comfortable  Psychiatric: Normal  HEENT: Trachea Midline, No torticollis  Pulmonary: No audible wheezing or respiratory distress   Skin: No masses, erythema, lacerations, fluctation, ulcerations  Neurovascular: Sensation Intact to the Median, Ulnar, Radial Nerve, Motor Intact to the Median, Ulnar, Radial Nerve and Pulses Intact    MUSCULOSKELETAL EXAMINATION:  Right hand:   No erythema  No ecchymosis   Mild edema  Non tender to palpation over fracture site   Slight pain with testing of stability of the fracture   Limited ROM due to stiffness  Sensation intact     ___________________________________________________  STUDIES REVIEWED:  Images obtained today of the right hand 3  views demonstrate healing angulated 5th metacarpal fracture, maintained alignment  PROCEDURES PERFORMED:  Procedures  No Procedures performed today    _____________________________________________________  ASSESSMENT/PLAN:    Healing angulated 5th metacarpal neck fracture that occurred on 08/09/19  * Ulnar gutter cast removed today and repeat x-ray's were obtained  * OT was ordered for an ulnar gutter splint   * Splint may be worn while out of the house, may be removed for showering and ROM exercises   * OT was ordered for ROM exercises   * Follow up in 3 weeks with repeat right hand x-ray's        Follow Up:  Return in about 3 weeks (around 9/24/2019)      Work/school status:  None     To Do Next Visit:  Re-evaluation of current issue and X-rays of the  right  hand      Scribe Attestation    I,:   Juan Mariel am acting as a scribe while in the presence of the attending physician :        I,:   Roxie Irby MD personally performed the services described in this documentation    as scribed in my presence :

## 2019-09-09 ENCOUNTER — EVALUATION (OUTPATIENT)
Dept: OCCUPATIONAL THERAPY | Facility: MEDICAL CENTER | Age: 39
End: 2019-09-09
Payer: COMMERCIAL

## 2019-09-09 DIAGNOSIS — S62.336D CLOSED DISPLACED FRACTURE OF NECK OF FIFTH METACARPAL BONE OF RIGHT HAND WITH ROUTINE HEALING, SUBSEQUENT ENCOUNTER: ICD-10-CM

## 2019-09-09 PROCEDURE — 97165 OT EVAL LOW COMPLEX 30 MIN: CPT

## 2019-09-09 NOTE — PROGRESS NOTES
OT Evaluation     Today's date: 2019  Patient name: Richy Landaverde  : 1980  MRN: [id number]  Referring provider: Tutu Carlos MD  Dx:   Encounter Diagnosis     ICD-10-CM    1  Closed displaced fracture of neck of fifth metacarpal bone of right hand with routine healing, subsequent encounter S68 163D Ambulatory referral to PT/OT hand therapy                  Assessment  Assessment details: Titi Jimenez presents in her hand based ulnar gutter splint from a fx she sustained   She experiences decreased ROM, decreased strength, and increased pain and swelling  She denies all numbness and tingling  She had a red john on the dorsal side of her SF PIP due to splint irritation, splint was adjusted for comfort and bruising  She would benefit from OT in order to increase her ROM, strength, and decrease her pain to allow her to return to ADL/IADLs  Impairments: abnormal or restricted ROM, activity intolerance, impaired physical strength, lacks appropriate home exercise program and pain with function    Goals  STG 1: Increase SF AROM 15% in 4-6 weeks  STG 2: Decrease overall pain to 510 in 4-6 weeks  STG 3: Increase overall strength by 15% in 4-6 weeks  STG 4: Compliant with HEP in 2 weeks  LTG 1: Complete all ADL/IADLs improved to prior level of function within 6-8 weeks  LTG 2: Leisure/social skills improved within 6-8 weeks  LTG 3: Work skills improved to prior level of function within 6-8 weeks    Patient Goal: To get back to working out    Goals, plan of care and treatment condition discussed with patient  Patient expresses their understanding and questions regarding these isues were addressed        Plan  Plan details: Increase ROM  Decrease Pain  Increase Strength  Patient would benefit from: custom splinting, OT eval and skilled occupational therapy  Planned modality interventions: thermotherapy: hydrocollator packs, thermotherapy: paraffin bath, cryotherapy and fluidotherapy  Planned therapy interventions: joint mobilization, manual therapy, Leo taping, functional ROM exercises, graded activity, graded exercise, home exercise program, therapeutic activities and therapeutic exercise  Frequency: 2x week  Duration in weeks: 8  Plan of Care beginning date: 2019  Plan of Care expiration date: 2019  Treatment plan discussed with: patient        Subjective Evaluation    History of Present Illness  Date of onset: 2019  Mechanism of injury: Fell onto camper steps  Pain  Current pain ratin  At best pain ratin  At worst pain ratin  Quality: throbbing and burning (Pins and Needles)    Hand dominance: right          Objective     Neurological Testing     Sensation     Wrist/Hand     Right   Intact: light touch    Active Range of Motion     Right Wrist   Wrist flexion: 15 degrees   Wrist extension: 55 degrees     Right Digits   Flexion   Ring     MCP: 36    PIP: 60    DIP: 40  Little     MCP: 32    PIP: 36    DIP: 30  Extension   Ring     MCP: 30  Little     MCP: 20    PIP: 12    DIP: 20    Strength/Myotome Testing     Additional Strength Details  Not tested secondary to precautions    Swelling     Left Wrist/Hand   Circumference MCP: 19 1 cm    Right Wrist/Hand   Circumference MCP: 20 3 cm      Flowsheet Rows      Most Recent Value   PT/OT G-Codes   Current Score  38   Projected Score  65             Precautions: Healing angulated fx      Manual              STM             PROM                                                        Exercise Diary              AAROM             Opposition             Gross Grasp             Finger Isolated Extension             Wrist AROM             Wrist maze                                                                                                                                                                                      HEP: Tendon Glides, Wrist AROM                 Modalities              MHP             Paraffin

## 2019-09-17 ENCOUNTER — APPOINTMENT (OUTPATIENT)
Dept: OCCUPATIONAL THERAPY | Facility: MEDICAL CENTER | Age: 39
End: 2019-09-17
Payer: COMMERCIAL

## 2019-09-18 ENCOUNTER — OFFICE VISIT (OUTPATIENT)
Dept: OCCUPATIONAL THERAPY | Facility: MEDICAL CENTER | Age: 39
End: 2019-09-18
Payer: COMMERCIAL

## 2019-09-18 DIAGNOSIS — S62.336D CLOSED DISPLACED FRACTURE OF NECK OF FIFTH METACARPAL BONE OF RIGHT HAND WITH ROUTINE HEALING, SUBSEQUENT ENCOUNTER: Primary | ICD-10-CM

## 2019-09-18 PROCEDURE — 97018 PARAFFIN BATH THERAPY: CPT

## 2019-09-18 PROCEDURE — 97530 THERAPEUTIC ACTIVITIES: CPT

## 2019-09-18 PROCEDURE — 97110 THERAPEUTIC EXERCISES: CPT

## 2019-09-18 NOTE — PROGRESS NOTES
Daily Note     Today's date: 2019  Patient name: Corrinne Picket  : 1980  MRN: [id number]  Referring provider: Georgette Chavez MD  Dx:   Encounter Diagnosis     ICD-10-CM    1  Closed displaced fracture of neck of fifth metacarpal bone of right hand with routine healing, subsequent encounter S60 829M                   Subjective: I do the ex about 5x/day, but it does feel sore after them  Objective: See treatment diary below      Assessment: Tolerated treatment fair  Patient demonstrated fatigue post treatment and would benefit from continued OT      Plan: Continue per plan of care        Precautions: Healing angulated fx      Manual              STM             PROM 5' IF/MF/RF                                                       Exercise Diary              AAROM TGE            Opposition Amin pegs            Gross Grasp Towel crunches             Finger Isolated Extension             Wrist AROM             Wrist maze 10x            gentle grasp/release wrist maze handle 10x                                                                                                                                                                        HEP: Tendon Glides, Wrist AROM                 Modalities              MHP             Paraffin 10'

## 2019-09-19 ENCOUNTER — OFFICE VISIT (OUTPATIENT)
Dept: OCCUPATIONAL THERAPY | Facility: MEDICAL CENTER | Age: 39
End: 2019-09-19
Payer: COMMERCIAL

## 2019-09-19 DIAGNOSIS — S62.336D CLOSED DISPLACED FRACTURE OF NECK OF FIFTH METACARPAL BONE OF RIGHT HAND WITH ROUTINE HEALING, SUBSEQUENT ENCOUNTER: Primary | ICD-10-CM

## 2019-09-19 PROCEDURE — 97018 PARAFFIN BATH THERAPY: CPT

## 2019-09-19 PROCEDURE — 97140 MANUAL THERAPY 1/> REGIONS: CPT

## 2019-09-19 PROCEDURE — 97530 THERAPEUTIC ACTIVITIES: CPT

## 2019-09-19 PROCEDURE — 97110 THERAPEUTIC EXERCISES: CPT

## 2019-09-19 NOTE — PROGRESS NOTES
Daily Note     Today's date: 2019  Patient name: Tim Cano  : 1980  MRN: [id number]  Referring provider: Tess Tian MD  Dx:   Encounter Diagnosis     ICD-10-CM    1  Closed displaced fracture of neck of fifth metacarpal bone of right hand with routine healing, subsequent encounter S65 302G                   Subjective: Today is a good day, I feel like I have much more motion      Objective: See treatment diary below      Assessment: Tolerated treatment fair  Patient demonstrated fatigue post treatment and would benefit from continued OT   Good motion, modified splint for more PIP motion  Doing very well, increased digit extension  Plan: Continue per plan of care        Precautions: Healing angulated fx      Manual             STM             PROM 5' IF/MF/RF 10'                                                       Exercise Diary             AAROM TGE            Opposition Key pegs Marbles SF only 1x           Progressive Grasp Towel crunches  GFB, RFB, 3x10           Finger Isolated Extension  x10 each digit           Wrist AROM  3x10 with cone           Wrist maze 10x 10x           gentle grasp/release wrist maze handle 10x                                                                                                                                                                        HEP: Tendon Glides, Wrist AROM                 Modalities             MHP             Paraffin 10' 15'

## 2019-09-23 ENCOUNTER — OFFICE VISIT (OUTPATIENT)
Dept: OCCUPATIONAL THERAPY | Facility: MEDICAL CENTER | Age: 39
End: 2019-09-23
Payer: COMMERCIAL

## 2019-09-23 ENCOUNTER — OFFICE VISIT (OUTPATIENT)
Dept: OBGYN CLINIC | Facility: MEDICAL CENTER | Age: 39
End: 2019-09-23

## 2019-09-23 ENCOUNTER — APPOINTMENT (OUTPATIENT)
Dept: RADIOLOGY | Facility: MEDICAL CENTER | Age: 39
End: 2019-09-23
Payer: COMMERCIAL

## 2019-09-23 VITALS
SYSTOLIC BLOOD PRESSURE: 107 MMHG | HEIGHT: 64 IN | WEIGHT: 157.6 LBS | DIASTOLIC BLOOD PRESSURE: 68 MMHG | HEART RATE: 64 BPM | BODY MASS INDEX: 26.91 KG/M2

## 2019-09-23 DIAGNOSIS — S62.336D CLOSED DISPLACED FRACTURE OF NECK OF FIFTH METACARPAL BONE OF RIGHT HAND WITH ROUTINE HEALING, SUBSEQUENT ENCOUNTER: Primary | ICD-10-CM

## 2019-09-23 DIAGNOSIS — S62.336D CLOSED DISPLACED FRACTURE OF NECK OF FIFTH METACARPAL BONE OF RIGHT HAND WITH ROUTINE HEALING, SUBSEQUENT ENCOUNTER: ICD-10-CM

## 2019-09-23 PROCEDURE — 97140 MANUAL THERAPY 1/> REGIONS: CPT

## 2019-09-23 PROCEDURE — 97530 THERAPEUTIC ACTIVITIES: CPT

## 2019-09-23 PROCEDURE — 97018 PARAFFIN BATH THERAPY: CPT

## 2019-09-23 PROCEDURE — 73130 X-RAY EXAM OF HAND: CPT

## 2019-09-23 PROCEDURE — 97110 THERAPEUTIC EXERCISES: CPT

## 2019-09-23 PROCEDURE — 99024 POSTOP FOLLOW-UP VISIT: CPT | Performed by: ORTHOPAEDIC SURGERY

## 2019-09-23 NOTE — PROGRESS NOTES
Daily Note     Today's date: 2019  Patient name: Amrit Hi  : 1980  MRN: [id number]  Referring provider: Ludy Nur MD  Dx:   Encounter Diagnosis     ICD-10-CM    1  Closed displaced fracture of neck of fifth metacarpal bone of right hand with routine healing, subsequent encounter G00 841G                   Subjective: The MD told me to wean out of the splint    Objective: See treatment diary below      Assessment: Tolerated treatment fair  Patient demonstrated fatigue post treatment and would benefit from continued OT   Good DIP and PIP motion, continued tightness at MCP  Added place and holds to HEP  She was instructed to wean out of the splint, educated on how to do so and to continue to be careful with lifting  Plan: Continue per plan of care        Precautions: Healing angulated fx      Manual            STM             PROM 5' IF/MF/RF 10'  10'                                                     Exercise Diary            AAROM TGE            Opposition Key pegs Marbles SF only 1x Keypegs 1x          Progressive Grasp Towel crunches  GFB, RFB, 3x10 GFB, RFB 3x10          Finger Isolated Extension  x10 each digit x10 each digit          Wrist AROM  3x10 with cone 3x10 with cone          Wrist maze 10x 10x 10x          gentle grasp/release wrist maze handle 10x            Pencils   1x          Place and Holds   2x10 MCP and composite fist                                                                                                                                            HEP: Tendon Glides, Wrist AROM                 Modalities            MHP             Paraffin 10' 15' 15'

## 2019-09-24 ENCOUNTER — APPOINTMENT (OUTPATIENT)
Dept: OCCUPATIONAL THERAPY | Facility: MEDICAL CENTER | Age: 39
End: 2019-09-24
Payer: COMMERCIAL

## 2019-09-26 ENCOUNTER — APPOINTMENT (OUTPATIENT)
Dept: OCCUPATIONAL THERAPY | Facility: MEDICAL CENTER | Age: 39
End: 2019-09-26
Payer: COMMERCIAL

## 2019-09-26 ENCOUNTER — OFFICE VISIT (OUTPATIENT)
Dept: OCCUPATIONAL THERAPY | Facility: MEDICAL CENTER | Age: 39
End: 2019-09-26
Payer: COMMERCIAL

## 2019-09-26 DIAGNOSIS — S62.336D CLOSED DISPLACED FRACTURE OF NECK OF FIFTH METACARPAL BONE OF RIGHT HAND WITH ROUTINE HEALING, SUBSEQUENT ENCOUNTER: Primary | ICD-10-CM

## 2019-09-26 PROCEDURE — 97018 PARAFFIN BATH THERAPY: CPT

## 2019-09-26 PROCEDURE — 97150 GROUP THERAPEUTIC PROCEDURES: CPT

## 2019-09-26 PROCEDURE — 97140 MANUAL THERAPY 1/> REGIONS: CPT

## 2019-09-26 NOTE — PROGRESS NOTES
Daily Note     Today's date: 2019  Patient name: Eboni Ayon  : 1980  MRN: [id number]  Referring provider: Janna Travis MD  Dx:   Encounter Diagnosis     ICD-10-CM    1  Closed displaced fracture of neck of fifth metacarpal bone of right hand with routine healing, subsequent encounter S66 427V                   Subjective: I have started using my hand for more    Objective: See treatment diary below  Group [id number]    Assessment: Tolerated treatment fair  Patient demonstrated fatigue post treatment and would benefit from continued OT   Good motion at start of session  Very good PROM at MCP  About 60% composite AROM fist achieved post session  Almost fully weaned out of splint  Plan: Continue per plan of care        Precautions: Healing angulated fx      Manual           STM             PROM 5' IF/MF/RF 10'  10' 15'                                                    Exercise Diary           AAROM TGE            Opposition Key pegs Marbles SF only 1x Keypegs 1x keypegs 1x         Progressive Grasp Towel crunches  GFB, RFB, 3x10 GFB, RFB 3x10 RFB 3x15         Finger Isolated Extension  x10 each digit x10 each digit 2x10 each digit         Wrist AROM  3x10 with cone 3x10 with cone          Wrist maze 10x 10x 10x 10x         gentle grasp/release wrist maze handle 10x            Pencils   1x 1x         Place and Holds   2x10 MCP and composite fist 2x10 MCP and composite fist                                                                                                                                           HEP: Tendon Glides, Wrist AROM                 Modalities           MHP             Paraffin 10' 15' 15' 15'

## 2019-09-30 ENCOUNTER — OFFICE VISIT (OUTPATIENT)
Dept: OCCUPATIONAL THERAPY | Facility: MEDICAL CENTER | Age: 39
End: 2019-09-30
Payer: COMMERCIAL

## 2019-09-30 DIAGNOSIS — S62.336D CLOSED DISPLACED FRACTURE OF NECK OF FIFTH METACARPAL BONE OF RIGHT HAND WITH ROUTINE HEALING, SUBSEQUENT ENCOUNTER: Primary | ICD-10-CM

## 2019-09-30 PROCEDURE — 97110 THERAPEUTIC EXERCISES: CPT

## 2019-09-30 PROCEDURE — 97018 PARAFFIN BATH THERAPY: CPT

## 2019-09-30 PROCEDURE — 97530 THERAPEUTIC ACTIVITIES: CPT

## 2019-09-30 PROCEDURE — 97140 MANUAL THERAPY 1/> REGIONS: CPT

## 2019-09-30 NOTE — PROGRESS NOTES
Daily Note     Today's date: 2019  Patient name: Kwaku Blunt  : 1980  MRN: [id number]  Referring provider: Robert Barrientos MD  Dx:   Encounter Diagnosis     ICD-10-CM    1  Closed displaced fracture of neck of fifth metacarpal bone of right hand with routine healing, subsequent encounter S66 060D                   Subjective: This is the best my hand has looked  Objective: See treatment diary below      Assessment: Tolerated treatment fair  Patient demonstrated fatigue post treatment and would benefit from continued OT   Decreased swelling since last visit  75% composite fist achieved with PROM  She states she has begun using a curling iron in preporation to RTW and is having success with increased time allotted  Tolerated upgrades well  Plan: Continue per plan of care        Precautions: Healing angulated fx      Manual          STM             PROM 5' IF/MF/RF 10'  10' 15' 15'                                                   Exercise Diary          AAROM TGE            Opposition Key pegs Marbles SF only 1x Keypegs 1x keypegs 1x keypegs 1x        Progressive Grasp Towel crunches  GFB, RFB, 3x10 GFB, RFB 3x10 RFB 3x15 RFB 3x10, YFB 3x10        Finger Isolated Extension  x10 each digit x10 each digit 2x10 each digit Jar 1x        Wrist AROM  3x10 with cone 3x10 with cone          Wrist maze 10x 10x 10x 10x 10x        gentle grasp/release wrist maze handle 10x            Pencils   1x 1x 1x        Place and Holds   2x10 MCP and composite fist 2x10 MCP and composite fist 2x10 MCO and Composite fist        Gross grasp     YPW cylindircal 3x10                                                                                                                             HEP: Tendon Glides, Wrist AROM                 Modalities          MHP             Paraffin 10' 15' 15' 15' 15'

## 2019-10-01 ENCOUNTER — APPOINTMENT (OUTPATIENT)
Dept: OCCUPATIONAL THERAPY | Facility: MEDICAL CENTER | Age: 39
End: 2019-10-01
Payer: COMMERCIAL

## 2019-10-03 ENCOUNTER — OFFICE VISIT (OUTPATIENT)
Dept: OCCUPATIONAL THERAPY | Facility: MEDICAL CENTER | Age: 39
End: 2019-10-03
Payer: COMMERCIAL

## 2019-10-03 DIAGNOSIS — S62.336D CLOSED DISPLACED FRACTURE OF NECK OF FIFTH METACARPAL BONE OF RIGHT HAND WITH ROUTINE HEALING, SUBSEQUENT ENCOUNTER: Primary | ICD-10-CM

## 2019-10-03 PROCEDURE — 97018 PARAFFIN BATH THERAPY: CPT

## 2019-10-03 PROCEDURE — 97110 THERAPEUTIC EXERCISES: CPT

## 2019-10-03 PROCEDURE — 97140 MANUAL THERAPY 1/> REGIONS: CPT

## 2019-10-03 NOTE — PROGRESS NOTES
Daily Note     Today's date: 10/3/2019  Patient name: Nohemy Marshall  : 1980  MRN: [id number]  Referring provider: Kirill Tsang MD  Dx:   Encounter Diagnosis     ICD-10-CM    1  Closed displaced fracture of neck of fifth metacarpal bone of right hand with routine healing, subsequent encounter R34 [id number]                   Subjective: I started working out and my pinky just won't  the weight    Objective: See treatment diary below      Assessment: Tolerated treatment fair  Patient demonstrated fatigue post treatment and would benefit from continued OT   Increased stiffness present today  Full PROM, difficulty holding ROM during place and holds  No increase in AROM at [facility] MCP  Educated on scar tissue massage and digit extension to be added to HEP  Plan: Continue per plan of care        Precautions: Healing angulated fx      Manual  9/18 9/19 9/23 9/26 9/30 10/3       STM             PROM 5' IF/MF/RF 10'  10' 15' 15' 15'                                                  Exercise Diary   103       AAROM TGE            Opposition Key pegs Marbles SF only 1x Keypegs 1x keypegs 1x keypegs 1x keypegs 1x       Progressive Grasp Towel crunches  GFB, RFB, 3x10 GFB, RFB 3x10 RFB 3x15 RFB 3x10, YFB 3x10 RFB 3x10, YFB 3x10       Finger Isolated Extension  x10 each digit x10 each digit 2x10 each digit Jar 1x Jar 1x       Wrist AROM  3x10 with cone 3x10 with cone          Wrist maze 10x 10x 10x 10x 10x 10x       gentle grasp/release wrist maze handle 10x            Pencils   1x 1x 1x 1x       Place and Holds   2x10 MCP and composite fist 2x10 MCP and composite fist 2x10 MCO and Composite fist 2x10 MCP and composite       Gross grasp     YPW cylindircal 3x10 Digiflex Yellow isolated                                                                                                                            HEP: Tendon Glides, Wrist AROM                 Modalities  9/18 9/19 9/23 9/26 9/30 10/3 [facility], [place]             Paraffin 10' 15' 15' 15' 15' 15'

## 2019-10-08 ENCOUNTER — OFFICE VISIT (OUTPATIENT)
Dept: OCCUPATIONAL THERAPY | Facility: MEDICAL CENTER | Age: 39
End: 2019-10-08
Payer: COMMERCIAL

## 2019-10-08 DIAGNOSIS — S62.336D CLOSED DISPLACED FRACTURE OF NECK OF FIFTH METACARPAL BONE OF RIGHT HAND WITH ROUTINE HEALING, SUBSEQUENT ENCOUNTER: Primary | ICD-10-CM

## 2019-10-08 PROCEDURE — 97018 PARAFFIN BATH THERAPY: CPT

## 2019-10-08 PROCEDURE — 97530 THERAPEUTIC ACTIVITIES: CPT

## 2019-10-08 PROCEDURE — 97110 THERAPEUTIC EXERCISES: CPT

## 2019-10-08 PROCEDURE — 97140 MANUAL THERAPY 1/> REGIONS: CPT

## 2019-10-08 NOTE — PROGRESS NOTES
Daily Note     Today's date: 10/8/2019  Patient name: Javad Grimm  : 1980  MRN: [id number]  Referring provider: Donita López MD  Dx:   Encounter Diagnosis     ICD-10-CM    1  Closed displaced fracture of neck of fifth metacarpal bone of right hand with routine healing, subsequent encounter [id number]                   Subjective: It's more sore this week    Objective: See treatment diary below      Assessment: Tolerated treatment fair  Patient demonstrated fatigue post treatment and would benefit from continued OT   Full PROM, better tolerance of place and holds  Continued difficulty of extension, but is able to hold into extension during place and holds  Joint capsule continues to be tight, will begin mobs next visit    Plan: Continue per plan of care        Precautions: Healing angulated fx      Manual  9/18 9/19 9/23 9/26 9/30 10/3 10/8      STM       5'      PROM 5' IF/MF/RF 10'  10' 15' 15' 15' 10'                                                 Exercise Diary  9/18 9/19 9/23 9/26 9/30 10/3 10/8      AAROM TGE            Opposition Key pegs Marbles SF only 1x Keypegs 1x keypegs 1x keypegs 1x keypegs 1x keypegs 1x      Progressive Grasp Towel crunches  GFB, RFB, 3x10 GFB, RFB 3x10 RFB 3x15 RFB 3x10, YFB 3x10 RFB 3x10, YFB 3x10 RFB 3x10, YFB 3x10      Finger Isolated Extension  x10 each digit x10 each digit 2x10 each digit Jar 1x Jar 1x Jar 1x      Wrist AROM  3x10 with cone 3x10 with cone          Wrist maze 10x 10x 10x 10x 10x 10x 10x      gentle grasp/release wrist maze handle 10x            Pencils   1x 1x 1x 1x 1x      Place and Holds   2x10 MCP and composite fist 2x10 MCP and composite fist 2x10 MCO and Composite fist 2x10 MCP and composite 2x10 MCP and composite      Gross grasp     YPW cylindircal 3x10 Digiflex Yellow isolated                                                                                                                            HEP: Tendon Glides, Wrist AROM Modalities  9/18 9/19 9/23 9/26 9/30 10/3       MHP             Paraffin 10' 15' 15' 15' 15' 15'

## 2019-10-10 ENCOUNTER — OFFICE VISIT (OUTPATIENT)
Dept: OCCUPATIONAL THERAPY | Facility: MEDICAL CENTER | Age: 39
End: 2019-10-10
Payer: COMMERCIAL

## 2019-10-10 DIAGNOSIS — S62.336D CLOSED DISPLACED FRACTURE OF NECK OF FIFTH METACARPAL BONE OF RIGHT HAND WITH ROUTINE HEALING, SUBSEQUENT ENCOUNTER: Primary | ICD-10-CM

## 2019-10-10 PROCEDURE — 97530 THERAPEUTIC ACTIVITIES: CPT

## 2019-10-10 PROCEDURE — 97140 MANUAL THERAPY 1/> REGIONS: CPT

## 2019-10-10 PROCEDURE — 97110 THERAPEUTIC EXERCISES: CPT

## 2019-10-10 NOTE — PROGRESS NOTES
Daily Note     Today's date: 10/10/2019  Patient name: Kenyatta Valdovinos  : 1980  MRN: [id number]  Referring provider: Maribeth Marino MD  Dx:   Encounter Diagnosis     ICD-10-CM    1  Closed displaced fracture of neck of fifth metacarpal bone of right hand with routine healing, subsequent encounter S64 [id number]                   Subjective: I think it's coming up a little bit more, and it's not very swollen    Objective: See treatment diary below      Assessment: Tolerated treatment fair  Patient demonstrated fatigue post treatment and would benefit from continued OT   Good tolerance of place and holds  Tolerated upgrades well, instructed to begin more functional tasks at home and will report next visits    Plan: Continue per plan of care        Precautions: Healing angulated fx      Manual  9/18 9/19 9/23 9/26 9/30 10/3 10/8 10/10     STM       5' 5'     PROM 5' IF/MF/RF 10'  10' 15' 15' 15' 10' 10' (RF and SF ext, SF flexion, WF)                                                Exercise Diary  9/18 9/19 9/23 9/26 9/30 10/3 10/8 1010     AAROM TGE            Opposition Key pegs Marbles SF only 1x Keypegs 1x keypegs 1x keypegs 1x keypegs 1x keypegs 1x      Progressive Grasp Towel crunches  GFB, RFB, 3x10 GFB, RFB 3x10 RFB 3x15 RFB 3x10, YFB 3x10 RFB 3x10, YFB 3x10 RFB 3x10, YFB 3x10 YFB 3x15     Finger Isolated Extension  x10 each digit x10 each digit 2x10 each digit Jar 1x Jar 1x Jar 1x Jar 1x     Wrist AROM  3x10 with cone 3x10 with cone          Wrist maze 10x 10x 10x 10x 10x 10x 10x 10x     gentle grasp/release wrist maze handle 10x            Pencils   1x 1x 1x 1x 1x      Place and Holds   2x10 MCP and composite fist 2x10 MCP and composite fist 2x10 MCO and Composite fist 2x10 MCP and composite 2x10 MCP and composite 2x10 composite     Gross grasp     YPW cylindircal 3x10 Digiflex Yellow isolated  Digiflex yellow isolated 3x10     Wrist strengthening        YFB 2x10 all directions     Pinch Ring        Y/Y 2x, RF and SF                                                                                                HEP: Tendon Glides, Wrist AROM                 Modalities  9/18 9/19 9/23 9/26 9/30 10/3 10/10      MHP             Paraffin 10' 15' 15' 15' 15' 15' 10'

## 2019-10-14 ENCOUNTER — APPOINTMENT (OUTPATIENT)
Dept: OCCUPATIONAL THERAPY | Facility: MEDICAL CENTER | Age: 39
End: 2019-10-14
Payer: COMMERCIAL

## 2019-10-15 ENCOUNTER — APPOINTMENT (OUTPATIENT)
Dept: OCCUPATIONAL THERAPY | Facility: MEDICAL CENTER | Age: 39
End: 2019-10-15
Payer: COMMERCIAL

## 2019-10-17 ENCOUNTER — OFFICE VISIT (OUTPATIENT)
Dept: OCCUPATIONAL THERAPY | Facility: MEDICAL CENTER | Age: 39
End: 2019-10-17
Payer: COMMERCIAL

## 2019-10-17 DIAGNOSIS — S62.336D CLOSED DISPLACED FRACTURE OF NECK OF FIFTH METACARPAL BONE OF RIGHT HAND WITH ROUTINE HEALING, SUBSEQUENT ENCOUNTER: Primary | ICD-10-CM

## 2019-10-17 PROCEDURE — 97140 MANUAL THERAPY 1/> REGIONS: CPT

## 2019-10-17 PROCEDURE — 97530 THERAPEUTIC ACTIVITIES: CPT

## 2019-10-17 PROCEDURE — 97018 PARAFFIN BATH THERAPY: CPT

## 2019-10-17 PROCEDURE — 97110 THERAPEUTIC EXERCISES: CPT

## 2019-10-17 NOTE — PROGRESS NOTES
Daily Note     Today's date: 10/17/2019  Patient name: Asha Ni  : 1980  MRN: [id number]  Referring provider: Adi Nelson MD  Dx:   No diagnosis found  Subjective: "It's getting better slowly but it's still swollen and hard to bend"    Objective: See treatment diary below      Assessment: Good tolerance to treatment session  Patient demonstrated fatigue and sorness post treatment and would continue to benefit from continued skilled OT services  Improvements noted with edema and SF PIP ROM with treatment session  Plan: Continue per plan of care        Precautions: Healing angulated fx      Manual  9/18 9/19 9/23 9/26 9/30 10/3 10/8 10/10 10/17    STM       5' 5' 5'    PROM 5' IF/MF/RF 10'  10' 15' 15' 15' 10' 10' (RF and SF ext, SF flexion, WF) 15'  (RF & SF ext/flexion)                                                Exercise Diary  9/18 9/19 9/23 9/26 9/30 10/3 10/8 10/10 10/17    AAROM TGE        TGE    Opposition Key pegs Marbles SF only 1x Keypegs 1x keypegs 1x keypegs 1x keypegs 1x keypegs 1x  keypegs 1x    Progressive Grasp Towel crunches  GFB, RFB, 3x10 GFB, RFB 3x10 RFB 3x15 RFB 3x10, YFB 3x10 RFB 3x10, YFB 3x10 RFB 3x10, YFB 3x10 YFB 3x15 Towel crunches with stretches; YFB 3x10    Finger Isolated Extension  x10 each digit x10 each digit 2x10 each digit Jar 1x Jar 1x Jar 1x Jar 1x Jar 1x     Wrist AROM  3x10 with cone 3x10 with cone          Wrist maze 10x 10x 10x 10x 10x 10x 10x 10x     gentle grasp/release wrist maze handle 10x            Pencils   1x 1x 1x 1x 1x      Place and Holds   2x10 MCP and composite fist 2x10 MCP and composite fist 2x10 MCO and Composite fist 2x10 MCP and composite 2x10 MCP and composite 2x10 composite 2x10 MCP and composite   AROM digit abd/adduction (focusing on RF / SF) 2x10    Gross grasp     YPW cylindircal 3x10 Digiflex Yellow isolated  Digiflex yellow isolated 3x10 Digiflex x yellow 3x10 (isolated)     Wrist strengthening        YFB 2x10 all directions RFB  2x10 all directions     Pinch Ring        Y/Y 2x, RF and SF Y/Y 2x, RF and SF                                                                                               HEP: Tendon Glides, Wrist AROM                 Modalities  9/18 9/19 9/23 9/26 9/30 10/3 10/10 10/17     MHP             Paraffin 10' 15' 15' 15' 15' 15' 10' 10'

## 2019-10-18 ENCOUNTER — APPOINTMENT (OUTPATIENT)
Dept: OCCUPATIONAL THERAPY | Facility: MEDICAL CENTER | Age: 39
End: 2019-10-18
Payer: COMMERCIAL

## 2019-10-21 ENCOUNTER — OFFICE VISIT (OUTPATIENT)
Dept: OBGYN CLINIC | Facility: MEDICAL CENTER | Age: 39
End: 2019-10-21

## 2019-10-21 ENCOUNTER — APPOINTMENT (OUTPATIENT)
Dept: OCCUPATIONAL THERAPY | Facility: MEDICAL CENTER | Age: 39
End: 2019-10-21
Payer: COMMERCIAL

## 2019-10-21 VITALS
HEART RATE: 61 BPM | BODY MASS INDEX: 26.91 KG/M2 | HEIGHT: 64 IN | WEIGHT: 157.6 LBS | SYSTOLIC BLOOD PRESSURE: 112 MMHG | DIASTOLIC BLOOD PRESSURE: 72 MMHG

## 2019-10-21 DIAGNOSIS — S62.336D CLOSED DISPLACED FRACTURE OF NECK OF FIFTH METACARPAL BONE OF RIGHT HAND WITH ROUTINE HEALING, SUBSEQUENT ENCOUNTER: Primary | ICD-10-CM

## 2019-10-21 DIAGNOSIS — M24.541 CONTRACTURE OF HAND JOINT, RIGHT: ICD-10-CM

## 2019-10-21 PROBLEM — S62.336A CLOSED DISPLACED FRACTURE OF NECK OF RIGHT FIFTH METACARPAL BONE: Status: ACTIVE | Noted: 2019-10-21

## 2019-10-21 PROCEDURE — 99024 POSTOP FOLLOW-UP VISIT: CPT | Performed by: ORTHOPAEDIC SURGERY

## 2019-10-21 RX ORDER — CEFAZOLIN SODIUM 2 G/50ML
2000 SOLUTION INTRAVENOUS ONCE
Status: CANCELLED | OUTPATIENT
Start: 2019-10-21 | End: 2019-10-21

## 2019-10-21 RX ORDER — HYDROCODONE BITARTRATE AND ACETAMINOPHEN 5; 325 MG/1; MG/1
1 TABLET ORAL EVERY 6 HOURS PRN
Qty: 20 TABLET | Refills: 0 | Status: SHIPPED | OUTPATIENT
Start: 2019-10-21 | End: 2019-10-31

## 2019-10-21 NOTE — H&P (VIEW-ONLY)
CHIEF COMPLAINT:  Chief Complaint   Patient presents with    Right Little Finger - Follow-up       SUBJECTIVE:  Annette Burciaga is a 41y o  year old female in for f/u regarding her right fifth metacarpal fracture sustained 8/9/19  She is attending occupational therapy with benefit and doing HEP  She has weaned out of protective splint  She still has functional deficits of small finger in regards to motion  Still tenderness over fracture site  The patient denies any cardiac or pulmonary issues  Denies diabetes  Denies any history of MI, gastric ulcers, kidney or liver issues  Denies blood thinners  PAST MEDICAL HISTORY:  Past Medical History:   Diagnosis Date    No known health problems        PAST SURGICAL HISTORY:  Past Surgical History:   Procedure Laterality Date    NO PAST SURGERIES         FAMILY HISTORY:  Family History   Problem Relation Age of Onset    No Known Problems Mother     No Known Problems Father     Diabetes Family     Heart disease Family     Breast cancer Neg Hx     Colon cancer Neg Hx     Ovarian cancer Neg Hx        SOCIAL HISTORY:  Social History     Tobacco Use    Smoking status: Never Smoker    Smokeless tobacco: Never Used   Substance Use Topics    Alcohol use: Yes     Comment: Social     Drug use: No       MEDICATIONS:    Current Outpatient Medications:     HYDROcodone-acetaminophen (NORCO) 5-325 mg per tablet, Take 1 tablet by mouth every 6 (six) hours as needed for pain for up to 10 days To be taken after surgeryMax Daily Amount: 4 tablets, Disp: 20 tablet, Rfl: 0    ALLERGIES:  No Known Allergies    REVIEW OF SYSTEMS:  Review of Systems   Constitutional: Negative for chills and fever  HENT: Negative for drooling and sneezing  Eyes: Negative for redness  Respiratory: Negative for cough and wheezing  Gastrointestinal: Negative for nausea and vomiting  Psychiatric/Behavioral: The patient is not nervous/anxious          VITALS:  Vitals:    10/21/19 5969 BP: 112/72   Pulse: 61       LABS:  HgA1c: No results found for: HGBA1C  BMP: No results found for: GLUCOSE, CALCIUM, NA, K, CO2, CL, BUN, CREATININE    _____________________________________________________  PHYSICAL EXAMINATION:  General: well developed and well nourished, alert, oriented times 3 and appears comfortable  Psychiatric: Normal  HEENT: Trachea Midline, No torticollis  Pulmonary: No audible wheezing or respiratory distress clear ausculations  Cardiac: normal rate and rhythm   Skin: No masses, erythema, lacerations, fluctation, ulcerations  Neurovascular: Sensation Intact to the Median, Ulnar, Radial Nerve, Motor Intact to the Median, Ulnar, Radial Nerve and Pulses Intact    MUSCULOSKELETAL EXAMINATION:  Right hand  Bony prominence felt   Tenderness over proximal 5th MC  Limited motion at MCP 5th metacarpal flexion likely due to scar tissue, able to extend fully but with noted weakness  Not able to make composite fist           ___________________________________________________  STUDIES REVIEWED:  No studies reviewed  PROCEDURES PERFORMED:  Procedures  No Procedures performed today    _____________________________________________________  ASSESSMENT/PLAN:  Angulated fifth metacarpal neck fracture sustained 8/9/19 with delayed healing, contracture(scar tissue) at MCP joint 5th digit      Patient has made strides in occupational therapy in regards to motion of small finger but was addressed scar tissue may be restricting motion at MCP joint and she may not get much more  Discussed doing release of scar tissue extensor tenolysis with possible ORIF fifth metacarpal neck due to continued pain and angulation of fracture to improve function of joint  Patient was very tender over fracture site  Patient will continue with occupational therapy and wishes to proceed with surgery  She wishes to proceed after November  With extensor tenolysis and possible ORIF fifth MC neck  Consent signed  *Surgery-With extensor tenolysis and possible ORIF fifth MC nec   * detailed consent was signed in the office   * anesthesia- local with sedation    * antibiotics- ordered   * OT order was placed   * Post op medication was sent to the office on file      Surgery medication instructions: You will stop eating and drinking at midnight the night before your surgery, but you may continue to take your normal medications with a small sip of water  In the morning on the day of your surgery, we would like you to take the following medications (as long as you have never been told to avoid Tylenol or NSAIDs like ibuprofen, Naproxen, Aleve, Advil, etc):   Ibuprofen 600mg one tablet by mouth   Tylenol 500mg one tablet by mouth    After surgery, we would like you to take Ibuprofen 600mg one tablet by mouth every 6 hours with food (at breakfast, lunch and dinner)  AND Tylenol 500 mg one tablet by mouth every 6 hours  (at breakfast, lunch and dinner) for 5-7 days after your surgery  Please take these medication EVERYDAY after surgery for 5-7 days, and not just as needed  You can take these medications at the same time  Taking these medications after surgery will limit your need for prescription pain medication  We will also prescribe a narcotic pain medication for a limited time after surgery that you can take as needed for moderate or severe pain  Diagnoses and all orders for this visit:    Closed displaced fracture of neck of fifth metacarpal bone of right hand with routine healing, subsequent encounter  -     Case request operating room: OPEN REDUCTION W/ INTERNAL FIXATION (ORIF) HAND Right 5th MC possible  Extensor tenolysis; Standing  -     HYDROcodone-acetaminophen (NORCO) 5-325 mg per tablet;  Take 1 tablet by mouth every 6 (six) hours as needed for pain for up to 10 days To be taken after surgeryMax Daily Amount: 4 tablets  -     Case request operating room: OPEN REDUCTION W/ INTERNAL FIXATION (ORIF) HAND Right 5th MC possible  Extensor tenolysis  -     Ambulatory referral to Occupational Therapy; Future    Contracture of hand joint, right  -     Case request operating room: OPEN REDUCTION W/ INTERNAL FIXATION (ORIF) HAND Right 5th MC possible  Extensor tenolysis; Standing  -     HYDROcodone-acetaminophen (NORCO) 5-325 mg per tablet; Take 1 tablet by mouth every 6 (six) hours as needed for pain for up to 10 days To be taken after surgeryMax Daily Amount: 4 tablets  -     Case request operating room: OPEN REDUCTION W/ INTERNAL FIXATION (ORIF) HAND Right 5th MC possible  Extensor tenolysis  -     Ambulatory referral to Occupational Therapy; Future    Other orders  -     Diet NPO; Sips with meds; Standing  -     Height and weight upon arrival; Standing  -     Void on call to OR; Standing  -     Insert peripheral IV; Standing  -     ceFAZolin (ANCEF) IVPB (premix) 2,000 mg        Follow Up:  Return for Post op  Operative Discussions:  Fracture Operative Treatment: The physiology of a fractured bone was discussed with the patient today  With displaced fractures, operative treatment often results in a functional recovery  Typically, these fractures undergo reduction either through percutaneous or open methods depending on the location and fracture pattern  Radiographs are typically taken at intervals throughout the fracture healing ensure maintenance of reduction and alignment  If the fracture loses its alignment, revision surgery may be required  Medical conditions such as diabetes, osteoporosis, vitamin D deficiency, and a history of or exposure to smoking may delay or prevent fracture healing    The risks and benefits of the procedure were explained to the patient, which include, but are not limited to: Bleeding, infection, recurrence, pain, scar, malunion, nonunion, damage to tendons, damage to nerves, and damage to blood vessels, and complications related to anesthesia, failure to give desire result, need for more surgery  These risks, along with alternative conservative treatment options, and postoperative protocols were voiced back and understood by the patient  All questions were answered to the patient's satisfaction  The patient agrees to comply with a standard postoperative protocol, and is willing to proceed  Education was provided via written and auditory forms  There were no barriers to learning  Written handouts regarding wound care, incision and scar care, and general preoperative information was provided to the patient  Prior to surgery, the patient may be requested to stop all anti-inflammatory medications  Prophylactic aspirin, Plavix, and Coumadin may be allowed to be continued  Medications including vitamin E , ginkgo, and fish oil are requested to be stopped approximately one week prior to surgery  Hypertensive medications and beta blockers, if taken, should be continued      Scribe Attestation    I,:   Su Broderick am acting as a scribe while in the presence of the attending physician :        I,:   Jennifer Salamanca MD personally performed the services described in this documentation    as scribed in my presence :

## 2019-10-21 NOTE — H&P
CHIEF COMPLAINT:  Chief Complaint   Patient presents with    Right Little Finger - Follow-up       SUBJECTIVE:  Azeb Martin is a 41y o  year old female in for f/u regarding her right fifth metacarpal fracture sustained 8/9/19  She is attending occupational therapy with benefit and doing HEP  She has weaned out of protective splint  She still has functional deficits of small finger in regards to motion  Still tenderness over fracture site  The patient denies any cardiac or pulmonary issues  Denies diabetes  Denies any history of MI, gastric ulcers, kidney or liver issues  Denies blood thinners  PAST MEDICAL HISTORY:  Past Medical History:   Diagnosis Date    No known health problems        PAST SURGICAL HISTORY:  Past Surgical History:   Procedure Laterality Date    NO PAST SURGERIES         FAMILY HISTORY:  Family History   Problem Relation Age of Onset    No Known Problems Mother     No Known Problems Father     Diabetes Family     Heart disease Family     Breast cancer Neg Hx     Colon cancer Neg Hx     Ovarian cancer Neg Hx        SOCIAL HISTORY:  Social History     Tobacco Use    Smoking status: Never Smoker    Smokeless tobacco: Never Used   Substance Use Topics    Alcohol use: Yes     Comment: Social     Drug use: No       MEDICATIONS:    Current Outpatient Medications:     HYDROcodone-acetaminophen (NORCO) 5-325 mg per tablet, Take 1 tablet by mouth every 6 (six) hours as needed for pain for up to 10 days To be taken after surgeryMax Daily Amount: 4 tablets, Disp: 20 tablet, Rfl: 0    ALLERGIES:  No Known Allergies    REVIEW OF SYSTEMS:  Review of Systems   Constitutional: Negative for chills and fever  HENT: Negative for drooling and sneezing  Eyes: Negative for redness  Respiratory: Negative for cough and wheezing  Gastrointestinal: Negative for nausea and vomiting  Psychiatric/Behavioral: The patient is not nervous/anxious          VITALS:  Vitals:    10/21/19 9448 BP: 112/72   Pulse: 61       LABS:  HgA1c: No results found for: HGBA1C  BMP: No results found for: GLUCOSE, CALCIUM, NA, K, CO2, CL, BUN, CREATININE    _____________________________________________________  PHYSICAL EXAMINATION:  General: well developed and well nourished, alert, oriented times 3 and appears comfortable  Psychiatric: Normal  HEENT: Trachea Midline, No torticollis  Pulmonary: No audible wheezing or respiratory distress clear ausculations  Cardiac: normal rate and rhythm   Skin: No masses, erythema, lacerations, fluctation, ulcerations  Neurovascular: Sensation Intact to the Median, Ulnar, Radial Nerve, Motor Intact to the Median, Ulnar, Radial Nerve and Pulses Intact    MUSCULOSKELETAL EXAMINATION:  Right hand  Bony prominence felt   Tenderness over proximal 5th MC  Limited motion at MCP 5th metacarpal flexion likely due to scar tissue, able to extend fully but with noted weakness  Not able to make composite fist           ___________________________________________________  STUDIES REVIEWED:  No studies reviewed  PROCEDURES PERFORMED:  Procedures  No Procedures performed today    _____________________________________________________  ASSESSMENT/PLAN:  Angulated fifth metacarpal neck fracture sustained 8/9/19 with delayed healing, contracture(scar tissue) at MCP joint 5th digit      Patient has made strides in occupational therapy in regards to motion of small finger but was addressed scar tissue may be restricting motion at MCP joint and she may not get much more  Discussed doing release of scar tissue extensor tenolysis with possible ORIF fifth metacarpal neck due to continued pain and angulation of fracture to improve function of joint  Patient was very tender over fracture site  Patient will continue with occupational therapy and wishes to proceed with surgery  She wishes to proceed after November  With extensor tenolysis and possible ORIF fifth MC neck  Consent signed  *Surgery-With extensor tenolysis and possible ORIF fifth MC nec   * detailed consent was signed in the office   * anesthesia- local with sedation    * antibiotics- ordered   * OT order was placed   * Post op medication was sent to the office on file      Surgery medication instructions: You will stop eating and drinking at midnight the night before your surgery, but you may continue to take your normal medications with a small sip of water  In the morning on the day of your surgery, we would like you to take the following medications (as long as you have never been told to avoid Tylenol or NSAIDs like ibuprofen, Naproxen, Aleve, Advil, etc):   Ibuprofen 600mg one tablet by mouth   Tylenol 500mg one tablet by mouth    After surgery, we would like you to take Ibuprofen 600mg one tablet by mouth every 6 hours with food (at breakfast, lunch and dinner)  AND Tylenol 500 mg one tablet by mouth every 6 hours  (at breakfast, lunch and dinner) for 5-7 days after your surgery  Please take these medication EVERYDAY after surgery for 5-7 days, and not just as needed  You can take these medications at the same time  Taking these medications after surgery will limit your need for prescription pain medication  We will also prescribe a narcotic pain medication for a limited time after surgery that you can take as needed for moderate or severe pain  Diagnoses and all orders for this visit:    Closed displaced fracture of neck of fifth metacarpal bone of right hand with routine healing, subsequent encounter  -     Case request operating room: OPEN REDUCTION W/ INTERNAL FIXATION (ORIF) HAND Right 5th MC possible  Extensor tenolysis; Standing  -     HYDROcodone-acetaminophen (NORCO) 5-325 mg per tablet;  Take 1 tablet by mouth every 6 (six) hours as needed for pain for up to 10 days To be taken after surgeryMax Daily Amount: 4 tablets  -     Case request operating room: OPEN REDUCTION W/ INTERNAL FIXATION (ORIF) HAND Right 5th MC possible  Extensor tenolysis  -     Ambulatory referral to Occupational Therapy; Future    Contracture of hand joint, right  -     Case request operating room: OPEN REDUCTION W/ INTERNAL FIXATION (ORIF) HAND Right 5th MC possible  Extensor tenolysis; Standing  -     HYDROcodone-acetaminophen (NORCO) 5-325 mg per tablet; Take 1 tablet by mouth every 6 (six) hours as needed for pain for up to 10 days To be taken after surgeryMax Daily Amount: 4 tablets  -     Case request operating room: OPEN REDUCTION W/ INTERNAL FIXATION (ORIF) HAND Right 5th MC possible  Extensor tenolysis  -     Ambulatory referral to Occupational Therapy; Future    Other orders  -     Diet NPO; Sips with meds; Standing  -     Height and weight upon arrival; Standing  -     Void on call to OR; Standing  -     Insert peripheral IV; Standing  -     ceFAZolin (ANCEF) IVPB (premix) 2,000 mg        Follow Up:  Return for Post op  Operative Discussions:  Fracture Operative Treatment: The physiology of a fractured bone was discussed with the patient today  With displaced fractures, operative treatment often results in a functional recovery  Typically, these fractures undergo reduction either through percutaneous or open methods depending on the location and fracture pattern  Radiographs are typically taken at intervals throughout the fracture healing ensure maintenance of reduction and alignment  If the fracture loses its alignment, revision surgery may be required  Medical conditions such as diabetes, osteoporosis, vitamin D deficiency, and a history of or exposure to smoking may delay or prevent fracture healing    The risks and benefits of the procedure were explained to the patient, which include, but are not limited to: Bleeding, infection, recurrence, pain, scar, malunion, nonunion, damage to tendons, damage to nerves, and damage to blood vessels, and complications related to anesthesia, failure to give desire result, need for more surgery  These risks, along with alternative conservative treatment options, and postoperative protocols were voiced back and understood by the patient  All questions were answered to the patient's satisfaction  The patient agrees to comply with a standard postoperative protocol, and is willing to proceed  Education was provided via written and auditory forms  There were no barriers to learning  Written handouts regarding wound care, incision and scar care, and general preoperative information was provided to the patient  Prior to surgery, the patient may be requested to stop all anti-inflammatory medications  Prophylactic aspirin, Plavix, and Coumadin may be allowed to be continued  Medications including vitamin E , ginkgo, and fish oil are requested to be stopped approximately one week prior to surgery  Hypertensive medications and beta blockers, if taken, should be continued      Scribe Attestation    I,:   Taina Brooks am acting as a scribe while in the presence of the attending physician :        I,:   Ellyn Elias MD personally performed the services described in this documentation    as scribed in my presence :

## 2019-10-21 NOTE — PROGRESS NOTES
CHIEF COMPLAINT:  Chief Complaint   Patient presents with    Right Little Finger - Follow-up       SUBJECTIVE:  Ashlee Shea is a 41y o  year old female in for f/u regarding her right fifth metacarpal fracture sustained 8/9/19  She is attending occupational therapy with benefit and doing HEP  She has weaned out of protective splint  She still has functional deficits of small finger in regards to motion  Still tenderness over fracture site  The patient denies any cardiac or pulmonary issues  Denies diabetes  Denies any history of MI, gastric ulcers, kidney or liver issues  Denies blood thinners  PAST MEDICAL HISTORY:  Past Medical History:   Diagnosis Date    No known health problems        PAST SURGICAL HISTORY:  Past Surgical History:   Procedure Laterality Date    NO PAST SURGERIES         FAMILY HISTORY:  Family History   Problem Relation Age of Onset    No Known Problems Mother     No Known Problems Father     Diabetes Family     Heart disease Family     Breast cancer Neg Hx     Colon cancer Neg Hx     Ovarian cancer Neg Hx        SOCIAL HISTORY:  Social History     Tobacco Use    Smoking status: Never Smoker    Smokeless tobacco: Never Used   Substance Use Topics    Alcohol use: Yes     Comment: Social     Drug use: No       MEDICATIONS:    Current Outpatient Medications:     HYDROcodone-acetaminophen (NORCO) 5-325 mg per tablet, Take 1 tablet by mouth every 6 (six) hours as needed for pain for up to 10 days To be taken after surgeryMax Daily Amount: 4 tablets, Disp: 20 tablet, Rfl: 0    ALLERGIES:  No Known Allergies    REVIEW OF SYSTEMS:  Review of Systems   Constitutional: Negative for chills and fever  HENT: Negative for drooling and sneezing  Eyes: Negative for redness  Respiratory: Negative for cough and wheezing  Gastrointestinal: Negative for nausea and vomiting  Psychiatric/Behavioral: The patient is not nervous/anxious          VITALS:  Vitals:    10/21/19 0987 BP: 112/72   Pulse: 61       LABS:  HgA1c: No results found for: HGBA1C  BMP: No results found for: GLUCOSE, CALCIUM, NA, K, CO2, CL, BUN, CREATININE    _____________________________________________________  PHYSICAL EXAMINATION:  General: well developed and well nourished, alert, oriented times 3 and appears comfortable  Psychiatric: Normal  HEENT: Trachea Midline, No torticollis  Pulmonary: No audible wheezing or respiratory distress clear ausculations  Cardiac: normal rate and rhythm   Skin: No masses, erythema, lacerations, fluctation, ulcerations  Neurovascular: Sensation Intact to the Median, Ulnar, Radial Nerve, Motor Intact to the Median, Ulnar, Radial Nerve and Pulses Intact    MUSCULOSKELETAL EXAMINATION:  Right hand  Bony prominence felt   Tenderness over proximal 5th MC  Limited motion at MCP 5th metacarpal flexion likely due to scar tissue, able to extend fully but with noted weakness  Not able to make composite fist           ___________________________________________________  STUDIES REVIEWED:  No studies reviewed  PROCEDURES PERFORMED:  Procedures  No Procedures performed today    _____________________________________________________  ASSESSMENT/PLAN:  Angulated fifth metacarpal neck fracture sustained 8/9/19 with delayed healing, contracture(scar tissue) at MCP joint 5th digit      Patient has made strides in occupational therapy in regards to motion of small finger but was addressed scar tissue may be restricting motion at MCP joint and she may not get much more  Discussed doing release of scar tissue extensor tenolysis with possible ORIF fifth metacarpal neck due to continued pain and angulation of fracture to improve function of joint  Patient was very tender over fracture site  Patient will continue with occupational therapy and wishes to proceed with surgery  She wishes to proceed after November  With extensor tenolysis and possible ORIF fifth MC neck  Consent signed  *Surgery-With extensor tenolysis and possible ORIF fifth MC nec   * detailed consent was signed in the office   * anesthesia- local with sedation    * antibiotics- ordered   * OT order was placed   * Post op medication was sent to the office on file      Surgery medication instructions: You will stop eating and drinking at midnight the night before your surgery, but you may continue to take your normal medications with a small sip of water  In the morning on the day of your surgery, we would like you to take the following medications (as long as you have never been told to avoid Tylenol or NSAIDs like ibuprofen, Naproxen, Aleve, Advil, etc):   Ibuprofen 600mg one tablet by mouth   Tylenol 500mg one tablet by mouth    After surgery, we would like you to take Ibuprofen 600mg one tablet by mouth every 6 hours with food (at breakfast, lunch and dinner)  AND Tylenol 500 mg one tablet by mouth every 6 hours  (at breakfast, lunch and dinner) for 5-7 days after your surgery  Please take these medication EVERYDAY after surgery for 5-7 days, and not just as needed  You can take these medications at the same time  Taking these medications after surgery will limit your need for prescription pain medication  We will also prescribe a narcotic pain medication for a limited time after surgery that you can take as needed for moderate or severe pain  Diagnoses and all orders for this visit:    Closed displaced fracture of neck of fifth metacarpal bone of right hand with routine healing, subsequent encounter  -     Case request operating room: OPEN REDUCTION W/ INTERNAL FIXATION (ORIF) HAND Right 5th MC possible  Extensor tenolysis; Standing  -     HYDROcodone-acetaminophen (NORCO) 5-325 mg per tablet;  Take 1 tablet by mouth every 6 (six) hours as needed for pain for up to 10 days To be taken after surgeryMax Daily Amount: 4 tablets  -     Case request operating room: OPEN REDUCTION W/ INTERNAL FIXATION (ORIF) HAND Right 5th MC possible  Extensor tenolysis  -     Ambulatory referral to Occupational Therapy; Future    Contracture of hand joint, right  -     Case request operating room: OPEN REDUCTION W/ INTERNAL FIXATION (ORIF) HAND Right 5th MC possible  Extensor tenolysis; Standing  -     HYDROcodone-acetaminophen (NORCO) 5-325 mg per tablet; Take 1 tablet by mouth every 6 (six) hours as needed for pain for up to 10 days To be taken after surgeryMax Daily Amount: 4 tablets  -     Case request operating room: OPEN REDUCTION W/ INTERNAL FIXATION (ORIF) HAND Right 5th MC possible  Extensor tenolysis  -     Ambulatory referral to Occupational Therapy; Future    Other orders  -     Diet NPO; Sips with meds; Standing  -     Height and weight upon arrival; Standing  -     Void on call to OR; Standing  -     Insert peripheral IV; Standing  -     ceFAZolin (ANCEF) IVPB (premix) 2,000 mg        Follow Up:  Return for Post op  Operative Discussions:  Fracture Operative Treatment: The physiology of a fractured bone was discussed with the patient today  With displaced fractures, operative treatment often results in a functional recovery  Typically, these fractures undergo reduction either through percutaneous or open methods depending on the location and fracture pattern  Radiographs are typically taken at intervals throughout the fracture healing ensure maintenance of reduction and alignment  If the fracture loses its alignment, revision surgery may be required  Medical conditions such as diabetes, osteoporosis, vitamin D deficiency, and a history of or exposure to smoking may delay or prevent fracture healing    The risks and benefits of the procedure were explained to the patient, which include, but are not limited to: Bleeding, infection, recurrence, pain, scar, malunion, nonunion, damage to tendons, damage to nerves, and damage to blood vessels, and complications related to anesthesia, failure to give desire result, need for more surgery  These risks, along with alternative conservative treatment options, and postoperative protocols were voiced back and understood by the patient  All questions were answered to the patient's satisfaction  The patient agrees to comply with a standard postoperative protocol, and is willing to proceed  Education was provided via written and auditory forms  There were no barriers to learning  Written handouts regarding wound care, incision and scar care, and general preoperative information was provided to the patient  Prior to surgery, the patient may be requested to stop all anti-inflammatory medications  Prophylactic aspirin, Plavix, and Coumadin may be allowed to be continued  Medications including vitamin E , ginkgo, and fish oil are requested to be stopped approximately one week prior to surgery  Hypertensive medications and beta blockers, if taken, should be continued      Scribe Attestation    I,:   Stacy Tran am acting as a scribe while in the presence of the attending physician :        I,:   Abhinav Morris MD personally performed the services described in this documentation    as scribed in my presence :

## 2019-10-22 ENCOUNTER — EVALUATION (OUTPATIENT)
Dept: OCCUPATIONAL THERAPY | Facility: MEDICAL CENTER | Age: 39
End: 2019-10-22
Payer: COMMERCIAL

## 2019-10-22 DIAGNOSIS — S62.336D CLOSED DISPLACED FRACTURE OF NECK OF FIFTH METACARPAL BONE OF RIGHT HAND WITH ROUTINE HEALING, SUBSEQUENT ENCOUNTER: Primary | ICD-10-CM

## 2019-10-22 PROCEDURE — 97018 PARAFFIN BATH THERAPY: CPT

## 2019-10-22 PROCEDURE — 97140 MANUAL THERAPY 1/> REGIONS: CPT

## 2019-10-22 NOTE — PROGRESS NOTES
Daily Note     Today's date: 10/22/2019  Patient name: Bao Triana  : 1980  MRN: [id number]  Referring provider: Tio Gamboa MD  Dx:   Encounter Diagnosis     ICD-10-CM    1  Closed displaced fracture of neck of fifth metacarpal bone of right hand with routine healing, subsequent encounter [id number]                   Subjective: "I am getting sugery"    Objective: See treatment diary below      Assessment: Good tolerance to treatment session  Continue scar tissue management and PROM until sx scheduled for   Full extension of SF MCP, however very weak  She will be having surgery for extensor tenolysis with a possible ORIF of the [facility]  Plan: Continue per plan of care        Precautions: Healing angulated fx      Manual  9/18 9/19 9/23 9/26 9/30 10/3 10/8 10/10 10/17 10/22   STM       5' 5' 5' 10'   PROM 5' IF/MF/RF 10'  10' 15' 15' 15' 10' 10' (RF and SF ext, SF flexion, WF) 15'  (RF & SF ext/flexion)  15'                                              Exercise Diary  9/18 9/19 9/23 9/26 9/30 10/3 10/8 10/10 10/17    AAROM TGE        TGE    Opposition [place] pe[place] Marbles SF only 1x Keypegs 1x keypegs 1x keypegs 1x keypegs 1x keypegs 1x  keypegs 1x    Progressive Grasp Towel crunches  GFB, RFB, 3x10 GFB, RFB 3x10 RFB 3x15 RFB 3x10, YFB 3x10 RFB 3x10, YFB 3x10 RFB 3x10, YFB 3x10 YFB 3x15 Towel crunches with stretches; YFB 3x10    Finger Isolated Extension  x10 each digit x10 each digit 2x10 each digit Jar 1x Jar 1x Jar 1x Jar 1x Jar 1x     Wrist AROM  3x10 with cone 3x10 with cone          Wrist maze 10x 10x 10x 10x 10x 10x 10x 10x     gentle grasp/release wrist maze handle 10x            Pencils   1x 1x 1x 1x 1x      Place and Holds   2x10 MCP and composite fist 2x10 MCP and composite fist 2x10 MCO and Composite fist 2x10 MCP and composite 2x10 MCP and composite 2x10 composite 2x10 MCP and composite   AROM digit abd/adduction (focusing on RF / SF) 2x10    Gross grasp     YPW cylindircal 3x10 Digiflex Yellow isolated  Digiflex yellow isolated 3x10 Digiflex x yellow 3x10 (isolated)     Wrist strengthening        YFB 2x10 all directions RFB  2x10 all directions     Pinch Ring        Y/Y 2x, RF and SF Y/Y 2x, RF and SF                                                                                               HEP: Tendon Glides, Wrist AROM                 Modalities  9/18 9/19 9/23 9/26 9/30 10/3 10/10 10/17 10/22    MHP             Paraffin 10' 15' 15' 15' 15' 15' 10' 10' 10'

## 2019-10-24 ENCOUNTER — OFFICE VISIT (OUTPATIENT)
Dept: OCCUPATIONAL THERAPY | Facility: MEDICAL CENTER | Age: 39
End: 2019-10-24
Payer: COMMERCIAL

## 2019-10-24 DIAGNOSIS — S62.336D CLOSED DISPLACED FRACTURE OF NECK OF FIFTH METACARPAL BONE OF RIGHT HAND WITH ROUTINE HEALING, SUBSEQUENT ENCOUNTER: Primary | ICD-10-CM

## 2019-10-24 PROCEDURE — 97018 PARAFFIN BATH THERAPY: CPT

## 2019-10-24 PROCEDURE — 97140 MANUAL THERAPY 1/> REGIONS: CPT

## 2019-10-24 NOTE — PROGRESS NOTES
Daily Note     Today's date: 10/24/2019  Patient name: Mendel Bloch  : 1980  MRN: [id number]  Referring provider: Megan Sylevster MD  Dx:   Encounter Diagnosis     ICD-10-CM    1  Closed displaced fracture of neck of fifth metacarpal bone of right hand with routine healing, subsequent encounter [id number]                   Subjective: "I am getting sugery, It's a little more swollen today, I went to a workout class"    Objective: See treatment diary below      Assessment: Good tolerance to treatment session and all manuals  Continue scar tissue management and PROM until sx scheduled for   Full extension of MCP with weak muscles, (4-/5)  MD stated he would like for her to continue therapy while waiting for surgery in order to work on PROM and scar management  Sx is an extensor tenosynovectomy with a possible ORIF of the MC head  Plan: Continue per plan of care    per MD recommendation     Precautions: Healing angulated fx      Manual  10/24 9/19 9/23 9/26 9/30 10/3 10/8 10/10 10/17 10/22   STM 10'      5' 5' 5' 10'   PROM 15' 10'  10' 15' 15' 15' 10' 10' (RF and SF ext, SF flexion, WF) 15'  (RF & SF ext/flexion)  15'                                              Exercise Diary  9/18 9/19 9/23 9/26 9/30 10/3 10/8 10/10 10/17    AAROM TGE        TGE    Opposition [place] Marbles SF only 1x Keypegs 1x keypegs 1x keypegs 1x keypegs 1x keypegs 1x  keypegs 1x    Progressive Grasp Towel crunches  GFB, RFB, 3x10 GFB, RFB 3x10 RFB 3x15 RFB 3x10, YFB 3x10 RFB 3x10, YFB 3x10 RFB 3x10, YFB 3x10 YFB 3x15 Towel crunches with stretches; YFB 3x10    Finger Isolated Extension  x10 each digit x10 each digit 2x10 each digit Jar 1x Jar 1x Jar 1x Jar 1x Jar 1x     Wrist AROM  3x10 with cone 3x10 with cone          Wrist maze 10x 10x 10x 10x 10x 10x 10x 10x     gentle grasp/release wrist maze handle 10x            Pencils   1x 1x 1x 1x 1x      Place and Holds   2x10 MCP and composite fist 2x10 MCP and composite fist 2x10 MCO and Composite fist 2x10 MCP and composite 2x10 MCP and composite 2x10 composite 2x10 MCP and composite   AROM digit abd/adduction (focusing on RF / SF) 2x10    Gross grasp     YPW cylindircal 3x10 Digiflex Yellow isolated  Digiflex yellow isolated 3x10 Digiflex x yellow 3x10 (isolated)     Wrist strengthening        YFB 2x10 all directions RFB  2x10 all directions     Pinch Ring        Y/Y 2x, RF and SF Y/Y 2x, RF and SF                                                                                               HEP: Tendon Glides, Wrist AROM                 Modalities  9/18 9/19 9/23 9/26 9/30 10/3 10/10 10/17 10/22 10/24   MHP             Paraffin 10' 15' 15' 15' 15' 15' 10' 10' 10' 15'

## 2019-10-25 NOTE — PRE-PROCEDURE INSTRUCTIONS
Pre-Surgery Instructions:   Medication Instructions    HYDROcodone-acetaminophen (NORCO) 5-325 mg per tablet Patient was instructed by Physician and understands

## 2019-10-28 ENCOUNTER — OFFICE VISIT (OUTPATIENT)
Dept: OCCUPATIONAL THERAPY | Facility: MEDICAL CENTER | Age: 39
End: 2019-10-28
Payer: COMMERCIAL

## 2019-10-28 DIAGNOSIS — S62.336D CLOSED DISPLACED FRACTURE OF NECK OF FIFTH METACARPAL BONE OF RIGHT HAND WITH ROUTINE HEALING, SUBSEQUENT ENCOUNTER: Primary | ICD-10-CM

## 2019-10-28 PROCEDURE — 97018 PARAFFIN BATH THERAPY: CPT

## 2019-10-28 PROCEDURE — 97140 MANUAL THERAPY 1/> REGIONS: CPT

## 2019-10-28 NOTE — PROGRESS NOTES
Daily Note     Today's date: 10/28/2019  Patient name: Augusto De Luna  : 1980  MRN: [id number]  Referring provider: Lucina Hdz MD  Dx:   Encounter Diagnosis     ICD-10-CM    1  Closed displaced fracture of neck of fifth metacarpal bone of right hand with routine healing, subsequent encounter J36 170F                   Subjective: "It's sore today, I had a long weekend"    Objective: See treatment diary below      Assessment: Good tolerance to treatment session and all manuals  Continue scar tissue management and PROM until sx scheduled for   Increased swelling today, added ab/ad to HEP  Plan: Continue per plan of care    per MD recommendation     Precautions: Healing angulated fx      Manual  10/24 10/28 9/23 9/26 9/30 10/3 10/8 10/10 10/17 10/22   STM 10' 10     5' 5' 5' 10'   PROM 15' 15'  10' 15' 15' 15' 10' 10' (RF and SF ext, SF flexion, WF) 15'  (RF & SF ext/flexion)  15'                                              Exercise Diary  9/18 9/19 9/23 9/26 9/30 10/3 10/8 10/10 10/17    AAROM TGE        TGE    Opposition [place] pe[place] Marbles SF only 1x Keypegs 1x keypegs 1x keypegs 1x keypegs 1x keypegs 1x  keypegs 1x    Progressive Grasp Towel crunches  GFB, RFB, 3x10 GFB, RFB 3x10 RFB 3x15 RFB 3x10, YFB 3x10 RFB 3x10, YFB 3x10 RFB 3x10, YFB 3x10 YFB 3x15 Towel crunches with stretches; YFB 3x10    Finger Isolated Extension  x10 each digit x10 each digit 2x10 each digit Jar 1x Jar 1x Jar 1x Jar 1x Jar 1x     Wrist AROM  3x10 with cone 3x10 with cone          Wrist maze 10x 10x 10x 10x 10x 10x 10x 10x     gentle grasp/release wrist maze handle 10x            Pencils   1x 1x 1x 1x 1x      Place and Holds   2x10 MCP and composite fist 2x10 MCP and composite fist 2x10 MCO and Composite fist 2x10 MCP and composite 2x10 MCP and composite 2x10 composite 2x10 MCP and composite   AROM digit abd/adduction (focusing on RF / SF) 2x10    Gross grasp     YPW cylindircal 3x10 Digiflex Yellow isolated  Digiflex yellow isolated 3x10 Digiflex x yellow 3x10 (isolated)     Wrist strengthening        YFB 2x10 all directions RFB  2x10 all directions     Pinch Ring        Y/Y 2x, RF and SF Y/Y 2x, RF and SF                                                                                               HEP: Tendon Glides, Wrist AROM                 Modalities  10/28 9/19 9/23 9/26 9/30 10/3 10/10 10/17 10/22 10/24   MHP             Paraffin 15' 15' 15' 15' 15' 15' 10' 10' 10' 15'

## 2019-11-01 ENCOUNTER — EVALUATION (OUTPATIENT)
Dept: OCCUPATIONAL THERAPY | Facility: MEDICAL CENTER | Age: 39
End: 2019-11-01
Payer: COMMERCIAL

## 2019-11-01 DIAGNOSIS — S62.336D CLOSED DISPLACED FRACTURE OF NECK OF FIFTH METACARPAL BONE OF RIGHT HAND WITH ROUTINE HEALING, SUBSEQUENT ENCOUNTER: Primary | ICD-10-CM

## 2019-11-01 PROCEDURE — 97110 THERAPEUTIC EXERCISES: CPT

## 2019-11-01 PROCEDURE — 97140 MANUAL THERAPY 1/> REGIONS: CPT

## 2019-11-01 PROCEDURE — 97018 PARAFFIN BATH THERAPY: CPT

## 2019-11-01 NOTE — PROGRESS NOTES
Daily Note     Today's date: 2019  Patient name: Shirley Horton  : 1980  MRN: [id number]  Referring provider: Antonio Infante MD  Dx:   Encounter Diagnosis     ICD-10-CM    1  Closed displaced fracture of neck of fifth metacarpal bone of right hand with routine healing, subsequent encounter [id number]                   Subjective: "It's been bothering me more recently than it has in the past"    Objective: See treatment diary below      Assessment: See RE      Plan: Sx , RE 2 days post-op     Precautions: Healing angulated fx      Manual  10/24 10/28 11/1 9/26 9/30 10/3 10/8 10/10 10/17 10/22   STM 10' 10 5    5' 5' 5' 10'   PROM 15' 15'  10' 15' 15' 15' 10' 10' (RF and SF ext, SF flexion, WF) 15'  (RF & SF ext/flexion)  15'                                              Exercise Diary  9/18 9/19 9/23 9/26 9/30 10/3 10/8 10/10 10/17    AAROM TGE        TGE    Opposition [place] pe[place] Marbles SF only 1x Keypegs 1x keypegs 1x keypegs 1x keypegs 1x keypegs 1x  keypegs 1x    Progressive Grasp Towel crunches  GFB, RFB, 3x10 GFB, RFB 3x10 RFB 3x15 RFB 3x10, YFB 3x10 RFB 3x10, YFB 3x10 RFB 3x10, YFB 3x10 YFB 3x15 Towel crunches with stretches; YFB 3x10    Finger Isolated Extension  x10 each digit x10 each digit 2x10 each digit Jar 1x Jar 1x Jar 1x Jar 1x Jar 1x     Wrist AROM  3x10 with cone 3x10 with cone          Wrist maze 10x 10x 10x 10x 10x 10x 10x 10x     gentle grasp/release wrist maze handle 10x            Pencils   1x 1x 1x 1x 1x      Place and Holds   2x10 MCP and composite fist 2x10 MCP and composite fist 2x10 MCO and Composite fist 2x10 MCP and composite 2x10 MCP and composite 2x10 composite 2x10 MCP and composite   AROM digit abd/adduction (focusing on RF / SF) 2x10    Gross grasp     YPW cylindircal 3x10 Digiflex Yellow isolated  Digiflex yellow isolated 3x10 Digiflex x yellow 3x10 (isolated)     Wrist strengthening        YFB 2x10 all directions RFB  2x10 all directions     Pinch Ring        Y/Y 2x, RF and SF Y/Y 2x, RF and SF                                                                                               HEP: Tendon Glides, Wrist AROM                 Modalities  10/28 11/1 9/23 9/26 9/30 10/3 10/10 10/17 10/22 10/24   MHP             Paraffin 15' 10' 15' 15' 15' 15' 10' 10' 10' 15'

## 2019-11-01 NOTE — PROGRESS NOTES
OT Re-Evaluation     Today's date: 2019  Patient name: Jami Carranza  : 1980  MRN: [id number]  Referring provider: Kerry Shell MD  Dx:   Encounter Diagnosis     ICD-10-CM    1  Closed displaced fracture of neck of fifth metacarpal bone of right hand with routine healing, subsequent encounter [id number] 336D                   Assessment  Assessment details: Sabrina Macario presents in her hand based ulnar gutter splint from a fx she sustained   She experiences decreased ROM, decreased strength, and increased pain and swelling  She denies all numbness and tingling  She had a red john on the dorsal side of her SF PIP due to splint irritation, splint was adjusted for comfort and bruising  She would benefit from OT in order to increase her ROM, strength, and decrease her pain to allow her to return to ADL/IADLs  : Sabrina Macario has increased ROM since IE, however her MCP motion continues to be decreased  She has elected to have surgery to be performed   Will complete at RE 2 days post-op  Impairments: abnormal or restricted ROM, activity intolerance, impaired physical strength, lacks appropriate home exercise program and pain with function    Goals  STG 1: Increase SF AROM 15% in 4-6 weeks  DIP and PIP MET, MCP NOT MET  STG 2: Decrease overall pain to 5/10 in 4-6 weeks  MET  STG 3: Increase overall strength by 15% in 4-6 weeks  NOT MET  STG 4: Compliant with HEP in 2 weeks  MET    LTG 1: Complete all ADL/IADLs improved to prior level of function within 6-8 weeks  LTG 2: Leisure/social skills improved within 6-8 weeks  LTG 3: Work skills improved to prior level of function within 6-8 weeks    Patient Goal: To get back to working out    Goals, plan of care and treatment condition discussed with patient  Patient expresses their understanding and questions regarding these isues were addressed        Plan  Plan details: Increase ROM  Decrease Pain  Increase Strength  Patient would benefit from: custom splinting, OT eval and skilled occupational therapy  Planned modality interventions: thermotherapy: hydrocollator packs, thermotherapy: paraffin bath, cryotherapy and fluidotherapy  Planned therapy interventions: joint mobilization, manual therapy, Leo taping, functional ROM exercises, graded activity, graded exercise, home exercise program, therapeutic activities and therapeutic exercise  Frequency: 2x week  Duration in weeks: 8  Plan of Care beginning date: 2019  Plan of Care expiration date: 2019  Treatment plan discussed with: patient        Subjective Evaluation    History of Present Illness  Date of onset: 2019  Mechanism of injury: [place]iffton Fredy onto camper steps  Pain  Current pain ratin  At best pain ratin  At worst pain ratin  Quality: burning and dull ache    Hand dominance: right          Objective     Neurological Testing     Sensation     Wrist/Hand     Right   Intact: light touch    Active Range of Motion     Right Wrist   Wrist flexion: 45 degrees   Wrist extension: 55 degrees     Right Digits   Flexion   Ring     PIP: 60  Little     MCP: 40    PIP: 60    DIP: 56    Additional Active Range of Motion Details  Full Ab/Ad with weakness    Strength/Myotome Testing     Additional Strength Details  Not tested secondary to precautions    Swelling     Left Wrist/Hand   Circumference MCP: 18 3 cm    Right Wrist/Hand   Circumference MCP: 18 9 cm

## 2019-11-05 ENCOUNTER — ANESTHESIA (OUTPATIENT)
Dept: PERIOP | Facility: HOSPITAL | Age: 39
End: 2019-11-05
Payer: COMMERCIAL

## 2019-11-05 ENCOUNTER — ANESTHESIA EVENT (OUTPATIENT)
Dept: PERIOP | Facility: HOSPITAL | Age: 39
End: 2019-11-05
Payer: COMMERCIAL

## 2019-11-05 ENCOUNTER — APPOINTMENT (OUTPATIENT)
Dept: RADIOLOGY | Facility: HOSPITAL | Age: 39
End: 2019-11-05
Payer: COMMERCIAL

## 2019-11-05 ENCOUNTER — HOSPITAL ENCOUNTER (OUTPATIENT)
Facility: HOSPITAL | Age: 39
Setting detail: OUTPATIENT SURGERY
Discharge: HOME | End: 2019-11-05
Attending: ORTHOPAEDIC SURGERY | Admitting: ORTHOPAEDIC SURGERY
Payer: COMMERCIAL

## 2019-11-05 VITALS
WEIGHT: 156.53 LBS | RESPIRATION RATE: 20 BRPM | TEMPERATURE: 97.8 F | HEIGHT: 64 IN | SYSTOLIC BLOOD PRESSURE: 113 MMHG | HEART RATE: 64 BPM | DIASTOLIC BLOOD PRESSURE: 66 MMHG | OXYGEN SATURATION: 94 % | BODY MASS INDEX: 26.72 KG/M2

## 2019-11-05 LAB
EXT PREGNANCY TEST URINE: NEGATIVE
EXT. CONTROL: NORMAL

## 2019-11-05 PROCEDURE — 81025 URINE PREGNANCY TEST: CPT | Performed by: ORTHOPAEDIC SURGERY

## 2019-11-05 PROCEDURE — 26520 RELEASE KNUCKLE CONTRACTURE: CPT | Performed by: ORTHOPAEDIC SURGERY

## 2019-11-05 PROCEDURE — 26520 RELEASE KNUCKLE CONTRACTURE: CPT | Performed by: PHYSICIAN ASSISTANT

## 2019-11-05 PROCEDURE — 26445 RELEASE HAND/FINGER TENDON: CPT | Performed by: PHYSICIAN ASSISTANT

## 2019-11-05 PROCEDURE — 26445 RELEASE HAND/FINGER TENDON: CPT | Performed by: ORTHOPAEDIC SURGERY

## 2019-11-05 PROCEDURE — C1713 ANCHOR/SCREW BN/BN,TIS/BN: HCPCS | Performed by: ORTHOPAEDIC SURGERY

## 2019-11-05 RX ORDER — PROPOFOL 10 MG/ML
INJECTION, EMULSION INTRAVENOUS CONTINUOUS PRN
Status: DISCONTINUED | OUTPATIENT
Start: 2019-11-05 | End: 2019-11-05 | Stop reason: SURG

## 2019-11-05 RX ORDER — MIDAZOLAM HYDROCHLORIDE 2 MG/2ML
INJECTION, SOLUTION INTRAMUSCULAR; INTRAVENOUS AS NEEDED
Status: DISCONTINUED | OUTPATIENT
Start: 2019-11-05 | End: 2019-11-05 | Stop reason: SURG

## 2019-11-05 RX ORDER — HYDROMORPHONE HCL/PF 1 MG/ML
0.5 SYRINGE (ML) INJECTION
Status: DISCONTINUED | OUTPATIENT
Start: 2019-11-05 | End: 2019-11-05 | Stop reason: HOSPADM

## 2019-11-05 RX ORDER — ACETAMINOPHEN 325 MG/1
650 TABLET ORAL EVERY 6 HOURS PRN
Status: CANCELLED | OUTPATIENT
Start: 2019-11-05

## 2019-11-05 RX ORDER — PROMETHAZINE HYDROCHLORIDE 25 MG/ML
12.5 INJECTION, SOLUTION INTRAMUSCULAR; INTRAVENOUS ONCE AS NEEDED
Status: DISCONTINUED | OUTPATIENT
Start: 2019-11-05 | End: 2019-11-05 | Stop reason: HOSPADM

## 2019-11-05 RX ORDER — EPHEDRINE SULFATE 50 MG/ML
INJECTION INTRAVENOUS AS NEEDED
Status: DISCONTINUED | OUTPATIENT
Start: 2019-11-05 | End: 2019-11-05 | Stop reason: SURG

## 2019-11-05 RX ORDER — ONDANSETRON 2 MG/ML
4 INJECTION INTRAMUSCULAR; INTRAVENOUS EVERY 6 HOURS PRN
Status: CANCELLED | OUTPATIENT
Start: 2019-11-05

## 2019-11-05 RX ORDER — ROPIVACAINE HYDROCHLORIDE 5 MG/ML
INJECTION, SOLUTION EPIDURAL; INFILTRATION; PERINEURAL
Status: COMPLETED | OUTPATIENT
Start: 2019-11-05 | End: 2019-11-05

## 2019-11-05 RX ORDER — MEPERIDINE HYDROCHLORIDE 50 MG/ML
12.5 INJECTION INTRAMUSCULAR; INTRAVENOUS; SUBCUTANEOUS AS NEEDED
Status: DISCONTINUED | OUTPATIENT
Start: 2019-11-05 | End: 2019-11-05 | Stop reason: HOSPADM

## 2019-11-05 RX ORDER — TRAMADOL HYDROCHLORIDE 50 MG/1
50 TABLET ORAL EVERY 6 HOURS PRN
Status: CANCELLED | OUTPATIENT
Start: 2019-11-05

## 2019-11-05 RX ORDER — PROPOFOL 10 MG/ML
INJECTION, EMULSION INTRAVENOUS AS NEEDED
Status: DISCONTINUED | OUTPATIENT
Start: 2019-11-05 | End: 2019-11-05 | Stop reason: SURG

## 2019-11-05 RX ORDER — MAGNESIUM HYDROXIDE 1200 MG/15ML
LIQUID ORAL AS NEEDED
Status: DISCONTINUED | OUTPATIENT
Start: 2019-11-05 | End: 2019-11-05 | Stop reason: HOSPADM

## 2019-11-05 RX ORDER — ONDANSETRON 2 MG/ML
4 INJECTION INTRAMUSCULAR; INTRAVENOUS ONCE AS NEEDED
Status: DISCONTINUED | OUTPATIENT
Start: 2019-11-05 | End: 2019-11-05 | Stop reason: HOSPADM

## 2019-11-05 RX ORDER — CEFAZOLIN SODIUM 2 G/50ML
2000 SOLUTION INTRAVENOUS ONCE
Status: COMPLETED | OUTPATIENT
Start: 2019-11-05 | End: 2019-11-05

## 2019-11-05 RX ORDER — LABETALOL 20 MG/4 ML (5 MG/ML) INTRAVENOUS SYRINGE
5
Status: DISCONTINUED | OUTPATIENT
Start: 2019-11-05 | End: 2019-11-05 | Stop reason: HOSPADM

## 2019-11-05 RX ORDER — LIDOCAINE HYDROCHLORIDE 20 MG/ML
INJECTION, SOLUTION INFILTRATION; PERINEURAL AS NEEDED
Status: DISCONTINUED | OUTPATIENT
Start: 2019-11-05 | End: 2019-11-05 | Stop reason: SURG

## 2019-11-05 RX ORDER — LIDOCAINE HYDROCHLORIDE 10 MG/ML
0.5 INJECTION, SOLUTION EPIDURAL; INFILTRATION; INTRACAUDAL; PERINEURAL ONCE AS NEEDED
Status: DISCONTINUED | OUTPATIENT
Start: 2019-11-05 | End: 2019-11-05 | Stop reason: HOSPADM

## 2019-11-05 RX ORDER — FENTANYL CITRATE/PF 50 MCG/ML
25 SYRINGE (ML) INJECTION
Status: DISCONTINUED | OUTPATIENT
Start: 2019-11-05 | End: 2019-11-05 | Stop reason: HOSPADM

## 2019-11-05 RX ORDER — SODIUM CHLORIDE, SODIUM LACTATE, POTASSIUM CHLORIDE, CALCIUM CHLORIDE 600; 310; 30; 20 MG/100ML; MG/100ML; MG/100ML; MG/100ML
125 INJECTION, SOLUTION INTRAVENOUS CONTINUOUS
Status: DISCONTINUED | OUTPATIENT
Start: 2019-11-05 | End: 2019-11-05 | Stop reason: HOSPADM

## 2019-11-05 RX ORDER — ALBUTEROL SULFATE 2.5 MG/3ML
2.5 SOLUTION RESPIRATORY (INHALATION) ONCE AS NEEDED
Status: DISCONTINUED | OUTPATIENT
Start: 2019-11-05 | End: 2019-11-05 | Stop reason: HOSPADM

## 2019-11-05 RX ORDER — ONDANSETRON 2 MG/ML
INJECTION INTRAMUSCULAR; INTRAVENOUS AS NEEDED
Status: DISCONTINUED | OUTPATIENT
Start: 2019-11-05 | End: 2019-11-05 | Stop reason: SURG

## 2019-11-05 RX ADMIN — ROPIVACAINE HYDROCHLORIDE 30 ML: 5 INJECTION, SOLUTION EPIDURAL; INFILTRATION; PERINEURAL at 12:24

## 2019-11-05 RX ADMIN — LIDOCAINE HYDROCHLORIDE 5 ML: 20 INJECTION, SOLUTION INFILTRATION; PERINEURAL at 13:43

## 2019-11-05 RX ADMIN — PROPOFOL 110 MCG/KG/MIN: 10 INJECTION, EMULSION INTRAVENOUS at 13:43

## 2019-11-05 RX ADMIN — ONDANSETRON 4 MG: 2 INJECTION INTRAMUSCULAR; INTRAVENOUS at 14:06

## 2019-11-05 RX ADMIN — MIDAZOLAM HYDROCHLORIDE 2 MG: 1 INJECTION, SOLUTION INTRAMUSCULAR; INTRAVENOUS at 12:23

## 2019-11-05 RX ADMIN — EPHEDRINE SULFATE 10 MG: 50 INJECTION, SOLUTION INTRAVENOUS at 14:13

## 2019-11-05 RX ADMIN — SODIUM CHLORIDE, SODIUM LACTATE, POTASSIUM CHLORIDE, AND CALCIUM CHLORIDE: .6; .31; .03; .02 INJECTION, SOLUTION INTRAVENOUS at 14:13

## 2019-11-05 RX ADMIN — PROPOFOL 100 MG: 10 INJECTION, EMULSION INTRAVENOUS at 13:43

## 2019-11-05 RX ADMIN — CEFAZOLIN SODIUM 2000 MG: 2 SOLUTION INTRAVENOUS at 13:35

## 2019-11-05 RX ADMIN — SODIUM CHLORIDE, SODIUM LACTATE, POTASSIUM CHLORIDE, AND CALCIUM CHLORIDE: .6; .31; .03; .02 INJECTION, SOLUTION INTRAVENOUS at 11:11

## 2019-11-05 NOTE — ANESTHESIA POSTPROCEDURE EVALUATION
Post-Op Assessment Note    CV Status:  Stable  Pain Score: 0    Pain management: adequate     Mental Status:  Sleepy   Hydration Status:  Stable   PONV Controlled:  None   Airway Patency:  Patent and adequate   Post Op Vitals Reviewed: Yes      Staff: CRNA           /70   Temp      Pulse  78   Resp   18   SpO2 94%

## 2019-11-05 NOTE — INTERVAL H&P NOTE
H&P reviewed  After examining the patient I find no changes in the patients condition since the H&P had been written      Vitals:    11/05/19 1145   BP: 137/71   Pulse: 73   Resp: 15   Temp: 98 1 °F (36 7 °C)   SpO2: 100%

## 2019-11-05 NOTE — ANESTHESIA PROCEDURE NOTES
Peripheral Block    Patient location during procedure: holding area  Start time: 11/5/2019 12:15 PM  Reason for block: at surgeon's request and post-op pain management  Staffing  Anesthesiologist: Jevon Schuster MD  Performed: anesthesiologist   Preanesthetic Checklist  Completed: patient identified, site marked, surgical consent, pre-op evaluation, timeout performed, IV checked, risks and benefits discussed and monitors and equipment checked  Peripheral Block  Patient position: supine  Prep: ChloraPrep  Patient monitoring: continuous pulse ox, frequent blood pressure checks, cardiac monitor and heart rate  Block type: supraclavicular  Laterality: right  Injection technique: single-shot  Procedures: ultrasound guided, Ultrasound guidance required for the procedure to increase accuracy and safety of medication placement and decrease risk of complications    Ultrasound permanent image savedropivacaine (NAROPIN) 0 5 % perineural infiltration, 30 mL  Needle  Needle type: Stimuplex   Needle gauge: 22 G  Needle length: 10 cm  Needle localization: ultrasound guidance  Needle insertion depth: 3 cm  Test dose: negative  Assessment  Injection assessment: incremental injection, local visualized surrounding nerve on ultrasound, negative aspiration for heme and no paresthesia on injection  Paresthesia pain: none  Heart rate change: no  Slow fractionated injection: yes  Post-procedure:  site cleaned  patient tolerated the procedure well with no immediate complications

## 2019-11-05 NOTE — DISCHARGE INSTRUCTIONS
Post Operative Instructions    You have had surgery on your arm today, please read and follow the information below:  · Elevate your hand above your elbow during the next 24-48 hours to help with swelling  · Place your hand and arm over your head with motion at your shoulder three times a day  · Do not apply any cream/ointment/oil to your incisions including antibiotics  · Do not soak your hands in standing water (dishwater, tubs, Jacuzzi's, pools, etc ) until given permission (typically 2-3 weeks after injury)    Call the office at [phone]  if you notice any:  · Increased numbness or tingling of your hand or fingers that is not relieved with elevation  · Increasing pain that is not controlled with medication  · Difficulty chewing, breathing, swallowing  · Chest pains or shortness of breath  · Fever over 101 4 degrees  Bandage: Your therapist will remove your bandage at your first therapy appointment  Motion: Move fingers into a fist 5 times a day, DO NOT move any splinted fingers  Weight bearing status: Avoid heavy lifting (>5 pounds) with the extremity that was operated on until follow up appointment  Normal activities of daily living are OK  Ice: Ice for 10 minutes every hour as needed for swelling x 24 hours  Sling: No sling necessary  Pain medication: A prescription for pain medication was provided in the office and sent to your pharmacy  Your prescription pain medication also contains Tylenol  Please limit total Tylenol dose to under 3,000 mg a day  After surgery, we would like you to take Ibuprofen 600mg one tablet by mouth every 6 hours with food (at breakfast, lunch and dinner)  AND Tylenol 500 mg one tablet by mouth every 6 hours  (at breakfast, lunch and dinner) for 5-7 days after your surgery  Please take these medication EVERYDAY after surgery for 5-7 days, and not just as needed  You can take these medications at the same time    Taking these medications after surgery will limit your need for prescription pain medication  If the pain becomes severe, and the pain medication is not alleviating symptoms, The greatest source of pain after surgery is usually a tight dressing due to increased swelling after surgery  If the pain becomes severe after surgery, and the patient medication is NOT alleviating the symptoms, the patient should do the following: The patient should  loosen the top dressing (usually coban or an ace bandage) and loosen/cut the rolled gauze beneath  The 4x4 gauze that is directly covering the incision should remain in place  The splint (if the patient has one) should remain in place  The ace bandage/coban can then be replaced on top in a less constrictive manor  If this does not help relieve the pain/numbness in a few hours, the patient should call our office (number listed below)  and we can have them seen in the office for further evaluation  Follow-up Appointment: 7-10 days with Dr Pooja Villaseñor  Occupational Therapy: 11/7/2019  AFTER THE FIRST THERAPY APPOINTMENT, the patient may remove the splint/dressing for showering and clean the incision with soap and water  Keep incision dry after washing  Do not expose the incision to dirty water (oceans, pools, hot tubs, etc)     If you need help scheduling Therapy, you can call [phone]        Please call the office at [phone] if you have any questions or concerns regarding your post-operative care

## 2019-11-05 NOTE — OP NOTE
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  Procedure(s) (LRB):  Right hand:  1) 5th metacarpal-phalangeal joint contracture release including capsulotomy  2) 5th EDC extensor tenolysis  3) osteophyte excision of 5th metacarpal    Specimen(s):  * No specimens in log *    Estimated Blood Loss:   Minimal    Drains:  * No LDAs found *    Anesthesia Type:   Regional with Sedation    Operative Indications:  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  Operative Findings:  Fracture malunion of right 5th metacarpal with slight volar step-off of the distal fragment with bony prominence affecting extensor tendons, extensor tendon and capsular adhesions  Complications:   None    Indication for procedure:  Patient is a 28-year-old right-hand-dominant female who suffered a right 5th metacarpal fracture with slight volar translation and tilt  Patient was treated conservatively and healed in this malunited position  Ultimately patient went on to develop a contracture at the MP joint with inability to fully flex the MP joint despite significant amount of therapy  Given failure of therapy we had discussed preoperatively regarding surgical options  Options discussed were extensor tenolysis, joint contracture release, possible osteotomy and fixation of 5th metacarpal fracture malunion  Risks and benefits of the surgery were explained to the patient she did understand and wish to proceed  Procedure and Technique:  Patient was identified the preop screening area    Consent was signed and verified after identifying the correct operative site   Patient was taken back to the operating room after receiving regional block preop hold area  Patient's right upper extremity was then prepped draped normal fashion chlorhexidine solution  The arm was then elevated exsanguinated with Esmarch and the tourniquet was inflated to 250 mm Hg  The Esmarch was removed  A longitudinal incision was made over the dorsum of the right 5th metacarpal phalangeal joint  Skin flaps were removed off the extensor apparatus  The extensor tendons were identified and were found to be adherent at the level of the fracture site  Extensor tenolysis was performed to allow the tendons to freely flow across the fracture site  Following tenolysis the MP joint was attempted to be flexed to verify motion  After tenolysis the MP joint did not result in any further flexion  The decision then was to proceed on with MP joint contracture release  Capsule was longitudinally split along the dorsum of the 5th metacarpal out to the proximal phalanx  Capsule was adhered at the fracture site where step-off was noted along with a dorsal bony prominence of the proximal fragment  The fracture was well healed in the slightly flexed position  A rongeur was then utilized to remove the bony prominence down to a level smooth surface for easy gliding of the extensor tendons  Once capsule release the joint was then flexed  There was still slight bit attention to flexion  The dorsal aspect of the radial and ulnar collateral ligaments within released off the origin of the metacarpal neck  Approximately 1/3 of the ligaments were released dorsally which resulted in excellent flexion of the MP joint with maintained stability to radial and ulnar collateral ligament stress testing  The joint was then thoroughly irrigated with copious amounts saline solution  The longitudinal split in the capsule was repaired using 3 0 Ethibond suture interrupted figure-of-eight fashion    The longitudinal split in the extensor tendon was repaired using 3 0 Ethibond suture in a running stitch  The wound was again irrigated tourniquet was released hemostasis was achieved  Skin was then closed with 4 nylon suture interrupted horizontal mattress fashion  Following full closure of all tissues the joint demonstrated excellent motion with respect to extension and flexion  The cascade of the digits demonstrated normal flexion cascade  Patient tolerated the procedure well with no complications in case  Patient was awakened to visually acknowledge her normal cascade prior to placing the right hand in a dressing  The wound was then dressed in sterile dressing and placed into a dorsal splint keeping the MP joints in a flexed position    Patient was sent to the [facility] in stable condition     I was present for the entire procedure, A qualified resident physician was not available and A physician assistant was required during the procedure for retraction tissue handling,dissection and suturing    Patient Disposition:  PACU     SIGNATURE: Osmar Rodriguez MD  DATE: November 5, 2019  TIME: 2:21 PM

## 2019-11-05 NOTE — ANESTHESIA PREPROCEDURE EVALUATION
Review of Systems/Medical History  Patient summary reviewed        Cardiovascular  Negative cardio ROS Exercise tolerance (METS): >4,     Pulmonary  Negative pulmonary ROS        GI/Hepatic  Negative GI/hepatic ROS          Negative  ROS        Endo/Other  Negative endo/other ROS      GYN  Negative gynecology ROS          Hematology  Negative hematology ROS      Musculoskeletal  Negative musculoskeletal ROS        Neurology  Negative neurology ROS      Psychology   Negative psychology ROS              Physical Exam    Airway    Mallampati score: I  TM Distance: >3 FB  Neck ROM: full     Dental   No notable dental hx     Cardiovascular  Comment: Negative ROS,     Pulmonary      Other Findings        Anesthesia Plan  ASA Score- 1     Anesthesia Type- general with ASA Monitors  Additional Monitors:   Airway Plan:     Comment: I have seen the patient and reviewed the history  Patient to receive IV sedation with full ASA monitors  SC block for post op pain control  Risks discussed with the patient, consent signed        Plan Factors-    Induction- intravenous  Postoperative Plan-     Informed Consent- Anesthetic plan and risks discussed with patient  I personally reviewed this patient with the CRNA  Discussed and agreed on the Anesthesia Plan with the CRNA  Fela Booker

## 2019-11-07 ENCOUNTER — OFFICE VISIT (OUTPATIENT)
Dept: OCCUPATIONAL THERAPY | Facility: MEDICAL CENTER | Age: 39
End: 2019-11-07
Payer: COMMERCIAL

## 2019-11-07 DIAGNOSIS — Z98.890 STATUS POST MUSCULOSKELETAL SYSTEM SURGERY: Primary | ICD-10-CM

## 2019-11-07 PROCEDURE — 97140 MANUAL THERAPY 1/> REGIONS: CPT

## 2019-11-07 PROCEDURE — 97168 OT RE-EVAL EST PLAN CARE: CPT

## 2019-11-07 NOTE — PROGRESS NOTES
OT Re-Evaluation     Today's date: 2019  Patient name: Kenyatta Valdovinos  : 1980  MRN: [id number]  Referring provider: Maribeth Marino MD  Dx:   Encounter Diagnosis     ICD-10-CM    1  Status post musculoskeletal system surgery [id number]                   Assessment  Assessment details: Cassandra Richards presents in her hand based ulnar gutter splint from a fx she sustained   She experiences decreased ROM, decreased strength, and increased pain and swelling  She denies all numbness and tingling  She had a red john on the dorsal side of her SF PIP due to splint irritation, splint was adjusted for comfort and bruising  She would benefit from OT in order to increase her ROM, strength, and decrease her pain to allow her to return to ADL/IADLs  : Cassandra Richards has increased ROM since IE, however her MCP motion continues to be decreased  She has elected to have surgery to be performed   Will complete at RE 2 days post-op  : Cassandra Richards is 2 days post op from an extensor tenolysis and capsulotomy  She presents in her post op dressing with increased pain, decreased motion, and increased swelling  Her sutures are intact with no s/s of infections  A ROM HEP has been instructed to her and she verbalized understanding  She has no ROM restrictions and will benefit from OT to increase her ROM, strength, and decrease her pain and edema to allow her to return to PLOF  Impairments: abnormal or restricted ROM, activity intolerance, impaired physical strength, lacks appropriate home exercise program and pain with function    Goals  STG 1: Increase SF AROM 15% in 4-6 weeks  STG 2: Decrease overall pain to 5/10 in 4-6 weeks  STG 3: Increase overall strength by 15% in 4-6 weeks  STG 4: Compliant with HEP in 2 weeks       LTG 1: Complete all ADL/IADLs improved to prior level of function within 6-8 weeks  LTG 2: Leisure/social skills improved within 6-8 weeks  LTG 3: Work skills improved to prior level of function within 6-8 weeks    Patient Goal: To get back to working out    Goals, plan of care and treatment condition discussed with patient  Patient expresses their understanding and questions regarding these isues were addressed  Plan  Plan details: Increase ROM  Decrease Pain  Increase Strength  Patient would benefit from: custom splinting, OT eval and skilled occupational therapy  Planned modality interventions: thermotherapy: hydrocollator packs, thermotherapy: paraffin bath, cryotherapy and fluidotherapy  Planned therapy interventions: joint mobilization, manual therapy, Leo taping, functional ROM exercises, graded activity, graded exercise, home exercise program, therapeutic activities and therapeutic exercise  Frequency: 2x week  Duration in weeks: 8  Plan of Care beginning date: 2019  Plan of Care expiration date: 2020  Treatment plan discussed with: patient        Subjective Evaluation    History of Present Illness  Date of onset: 2019  Mechanism of injury: Rom Montane onto camper steps resulting in a [address] head angulated fx     : Sweetie Fall presents post sx for an extensor tenolysis and capsulotomy    Pain  Current pain ratin  At best pain ratin  At worst pain ratin  Quality: burning and dull ache    Hand dominance: right          Objective     Neurological Testing     Sensation     Wrist/Hand     Right   Intact: light touch    Active Range of Motion     Right Wrist   Wrist flexion: 10 degrees   Wrist extension: 45 degrees     Right Digits   Flexion   Little     MCP: 10    PIP: 30    DIP: 15    Additional Active Range of Motion Details  Full Ab/Ad with weakness    Strength/Myotome Testing     Additional Strength Details  Not tested secondary to precautions    Swelling     Left Wrist/Hand   Circumference MCP: 18 2 cm    Right Wrist/Hand   Circumference MCP: 19 5 cm

## 2019-11-07 NOTE — PROGRESS NOTES
Daily Note     Today's date: 2019  Patient name: Jayleen Do  : 1980  MRN: [id number]  Referring provider: Delano Armenta MD  Dx:   Encounter Diagnosis     ICD-10-CM    1  Status post musculoskeletal system surgery P62 579                   Subjective: It's really burning      Objective: See treatment diary below      Assessment: Tolerated treatment well  Patient would benefit from continued OT See IE  Instructed on digit ROM HEP      Plan: Continue per plan of care           Precautions: S/P tenolysis and capsulotomy      Manual              PROM             STM             Wound Care 15'                                          Exercise Diary              Progressive Grasp             Opposition             Wrist Maze                                                                                                                                                                                                                             HEP: Digit ROM                 Modalities              P

## 2019-11-08 ENCOUNTER — APPOINTMENT (OUTPATIENT)
Dept: OCCUPATIONAL THERAPY | Facility: MEDICAL CENTER | Age: 39
End: 2019-11-08
Payer: COMMERCIAL

## 2019-11-11 ENCOUNTER — OFFICE VISIT (OUTPATIENT)
Dept: OCCUPATIONAL THERAPY | Facility: MEDICAL CENTER | Age: 39
End: 2019-11-11
Payer: COMMERCIAL

## 2019-11-11 DIAGNOSIS — Z98.890 STATUS POST MUSCULOSKELETAL SYSTEM SURGERY: Primary | ICD-10-CM

## 2019-11-11 DIAGNOSIS — S62.336D CLOSED DISPLACED FRACTURE OF NECK OF FIFTH METACARPAL BONE OF RIGHT HAND WITH ROUTINE HEALING, SUBSEQUENT ENCOUNTER: ICD-10-CM

## 2019-11-11 PROCEDURE — 97110 THERAPEUTIC EXERCISES: CPT

## 2019-11-11 PROCEDURE — 97140 MANUAL THERAPY 1/> REGIONS: CPT

## 2019-11-11 NOTE — PROGRESS NOTES
Daily Note     Today's date: 2019  Patient name: Bao Triana  : 1980  MRN: [id number]  Referring provider: Tio Gamboa MD  Dx:   Encounter Diagnosis     ICD-10-CM    1  Status post musculoskeletal system surgery Z98 890    2  Closed displaced fracture of neck of fifth metacarpal bone of right hand with routine healing, subsequent encounter S65 574D                   Subjective: I probably didn't start the exercises until like Saturday, my fingers were too fat      Objective: See treatment diary below      Assessment: Tolerated treatment well  Patient would benefit from continued OT Motion restricted by edema, can tolerate PROM well  Noted decreased extension, however the extensors are firing  Will progress to a static flexion LLPS splint if needed once sutures are removed to increase motion  Re-educated on the need to complete HEP as instructed  Plan: Continue per plan of care           Precautions: S/P tenolysis and capsulotomy      Manual              PROM  15'           STM             Wound Care 15'            MEM  10'                            Exercise Diary              Progressive Grasp  3x10 isotubes           Opposition  Marbles 50x           Wrist Maze  10x           Active Extension  10x                                                                                                                                                                                                              HEP: Digit ROM                 Modalities              MHP  15'

## 2019-11-14 ENCOUNTER — OFFICE VISIT (OUTPATIENT)
Dept: OBGYN CLINIC | Facility: CLINIC | Age: 39
End: 2019-11-14

## 2019-11-14 VITALS
BODY MASS INDEX: 26.7 KG/M2 | DIASTOLIC BLOOD PRESSURE: 74 MMHG | SYSTOLIC BLOOD PRESSURE: 111 MMHG | HEART RATE: 63 BPM | WEIGHT: 156.4 LBS | HEIGHT: 64 IN

## 2019-11-14 DIAGNOSIS — Z48.89 AFTERCARE FOLLOWING SURGERY: Primary | ICD-10-CM

## 2019-11-14 PROCEDURE — 99024 POSTOP FOLLOW-UP VISIT: CPT | Performed by: ORTHOPAEDIC SURGERY

## 2019-11-15 ENCOUNTER — OFFICE VISIT (OUTPATIENT)
Dept: OCCUPATIONAL THERAPY | Facility: MEDICAL CENTER | Age: 39
End: 2019-11-15
Payer: COMMERCIAL

## 2019-11-15 DIAGNOSIS — S62.336D CLOSED DISPLACED FRACTURE OF NECK OF FIFTH METACARPAL BONE OF RIGHT HAND WITH ROUTINE HEALING, SUBSEQUENT ENCOUNTER: ICD-10-CM

## 2019-11-15 DIAGNOSIS — Z98.890 STATUS POST MUSCULOSKELETAL SYSTEM SURGERY: Primary | ICD-10-CM

## 2019-11-15 PROCEDURE — 97140 MANUAL THERAPY 1/> REGIONS: CPT

## 2019-11-15 PROCEDURE — 97110 THERAPEUTIC EXERCISES: CPT

## 2019-11-15 NOTE — PROGRESS NOTES
Daily Note     Today's date: 11/15/2019  Patient name: Fannie Cao  : 1980  MRN: [id number]  Referring provider: Radha Morocho MD  Dx:   Encounter Diagnosis     ICD-10-CM    1  Status post musculoskeletal system surgery Z98 890    2  Closed displaced fracture of neck of fifth metacarpal bone of right hand with routine healing, subsequent encounter S62 863D                   Subjective: I left his office crying yesterday    Objective: See treatment diary below  AROM MCP: 30  PROM MCP: 60      Assessment: Tolerated treatment well  Patient would benefit from continued OT ROM lag noted, tolerated PROM well, continues to lack active extension of the finger  Reviewed AROM and PROM HEP that should be done every waking hour, she verbalized understanding  Continue aggressive motion      Plan: Continue per plan of care    Aggressive PROM        Precautions: S/P tenolysis and capsulotomy      Manual   11/11 11/15          PROM  15' 30'          STM   5'          Wound Care 15'            MEM  10'                            Exercise Diary   11/11 11/15          Progressive Grasp  3x10 isotubes 3x10 isotubes          Opposition  Marbles 50x           Wrist Maze  10x           Active Extension  10x 15x                                                                                                                                                                                                             HEP: Digit ROM   Reviewed PROM and place and holds              Modalities   11/11 11/15          MHP  15' 5'

## 2019-11-18 ENCOUNTER — OFFICE VISIT (OUTPATIENT)
Dept: OCCUPATIONAL THERAPY | Facility: MEDICAL CENTER | Age: 39
End: 2019-11-18
Payer: COMMERCIAL

## 2019-11-18 DIAGNOSIS — Z98.890 STATUS POST MUSCULOSKELETAL SYSTEM SURGERY: Primary | ICD-10-CM

## 2019-11-18 DIAGNOSIS — S62.336D CLOSED DISPLACED FRACTURE OF NECK OF FIFTH METACARPAL BONE OF RIGHT HAND WITH ROUTINE HEALING, SUBSEQUENT ENCOUNTER: ICD-10-CM

## 2019-11-18 PROCEDURE — 97014 ELECTRIC STIMULATION THERAPY: CPT

## 2019-11-18 PROCEDURE — G0283 ELEC STIM OTHER THAN WOUND: HCPCS

## 2019-11-18 NOTE — PROGRESS NOTES
Daily Note     Today's date: 2019  Patient name: Nohemy Marshall  : 1980  MRN: [id number]  Referring provider: Kirill Tsang MD  Dx:   Encounter Diagnosis     ICD-10-CM    1  Status post musculoskeletal system surgery Z98 890    2  Closed displaced fracture of neck of fifth metacarpal bone of right hand with routine healing, subsequent encounter S66 618D                   Subjective: I feel like my finger going down is better, but it's worse with extension    Objective: See treatment diary below      Assessment: Tolerated treatment well  Patient would benefit from continued OT Trialed NMES with good tolerance and tendon excursion  Discussed coming 3x a week to therapy due to poor MCP ROM  Plan: Continue per plan of care    Aggressive PROM        Precautions: S/P tenolysis and capsulotomy      Manual   11/11 11/15 11/18         PROM  15' 30' 20'         STM   5' 5'         Wound Care 15'            MEM  10'                            Exercise Diary   11/11 11/15          Progressive Grasp  3x10 isotubes 3x10 isotubes          Opposition  Marbles 50x           Wrist Maze  10x           Active Extension  10x 15x                                                                                                                                                                                                             HEP: Digit ROM   Reviewed PROM and place and holds              Modalities   11/11 11/15 11/18         MHP  15' 5' 15'         NMES    8'

## 2019-11-21 ENCOUNTER — OFFICE VISIT (OUTPATIENT)
Dept: OCCUPATIONAL THERAPY | Facility: MEDICAL CENTER | Age: 39
End: 2019-11-21
Payer: COMMERCIAL

## 2019-11-21 DIAGNOSIS — Z98.890 STATUS POST MUSCULOSKELETAL SYSTEM SURGERY: Primary | ICD-10-CM

## 2019-11-21 PROCEDURE — 97140 MANUAL THERAPY 1/> REGIONS: CPT

## 2019-11-21 PROCEDURE — 97110 THERAPEUTIC EXERCISES: CPT

## 2019-11-21 NOTE — PROGRESS NOTES
Daily Note     Today's date: 2019  Patient name: Tila Capone  : 1980  MRN: [id number]  Referring provider: Agueda Kamara MD  Dx:   Encounter Diagnosis     ICD-10-CM    1  Status post musculoskeletal system surgery P38 410                   Subjective: I feel like I can  stuff now, but I can't push up weight, that still really hurt    Objective: See treatment diary below      Assessment: Tolerated treatment well  Patient would benefit from continued OT Began fabricating a LLPS, explained it's purpose and she would like to try in order to get more motion  Very good place and hold ability  Nearly full MCP PROM post heat  Reviewed HEP and added intrinsic stretching  Cannot actively SF to 0 in GE  Plan: Continue per plan of care    Aggressive PROM        Precautions: S/P tenolysis and capsulotomy      Manual   11/11 11/15 11/18 11/21        PROM  15' 30' 20' 15'        STM   5' 5' 5'        Wound Care 15'            MEM  10'           Intrinsic Stretching     5'            Exercise Diary   11/11 11/15 11/21         Progressive Grasp  3x10 isotubes 3x10 isotubes          Opposition  Marbles 50x           Wrist Maze  10x           Active Extension  10x 15x 2x10         Fist with foam    Red 2x20         Place and Hold    2x15                                                                                                                                                                                  HEP: Digit ROM   Reviewed PROM and place and holds              Modalities   11/11 11/15 11/18 11/21        MHP  15' 5' 15' 5'        NMES    8'         Fluido

## 2019-11-25 ENCOUNTER — OFFICE VISIT (OUTPATIENT)
Dept: OCCUPATIONAL THERAPY | Facility: MEDICAL CENTER | Age: 39
End: 2019-11-25
Payer: COMMERCIAL

## 2019-11-25 DIAGNOSIS — Z98.890 STATUS POST MUSCULOSKELETAL SYSTEM SURGERY: Primary | ICD-10-CM

## 2019-11-25 PROCEDURE — 97110 THERAPEUTIC EXERCISES: CPT

## 2019-11-25 PROCEDURE — 97022 WHIRLPOOL THERAPY: CPT

## 2019-11-25 PROCEDURE — 97140 MANUAL THERAPY 1/> REGIONS: CPT

## 2019-11-25 NOTE — PROGRESS NOTES
Daily Note     Today's date: 2019  Patient name: Parmjit Hayden  : 1980  MRN: [id number]  Referring provider: Afsaneh Fritz MD  Dx:   Encounter Diagnosis     ICD-10-CM    1  Status post musculoskeletal system surgery P23 735                   Subjective: I feel like I can  stuff now, but I can't push up weight, that still really hurt    Objective: See treatment diary below      Assessment: Tolerated treatment well  Patient would benefit from continued OT Poor motion at the beginning of session, better post PROM and intrinsic stretching  Reviewed HEP  Full PROM achieved during stretching  Will educate on LLPS next session  Plan: Continue per plan of care    Aggressive PROM        Precautions: S/P tenolysis and capsulotomy      Manual   11/11 11/15 11/18 11/21 11/25       PROM  15' 30' 20' 15' 15'       STM   5' 5' 5'        Wound Care 15'            MEM  10'           Intrinsic Stretching     5' 5'           Exercise Diary   11/11 11/15 11/21 11/25        Progressive Grasp  3x10 isotubes 3x10 isotubes  3x10 isotubes        Opposition  Marbles 50x           Wrist Maze  10x           Active Extension  10x 15x 2x10 2x10        Fist with foam    Red 2x20 Red 2x20        Place and Hold    2x15 2x15                                                                                                                                                                                 HEP: Digit ROM   Reviewed PROM and place and holds              Modalities   11/11 11/15 11/18 11/21 11/25       MHP  15' 5' 15' 5'        NMES    8'  15'       Fluido

## 2019-11-29 ENCOUNTER — OFFICE VISIT (OUTPATIENT)
Dept: OCCUPATIONAL THERAPY | Facility: MEDICAL CENTER | Age: 39
End: 2019-11-29
Payer: COMMERCIAL

## 2019-11-29 DIAGNOSIS — Z98.890 STATUS POST MUSCULOSKELETAL SYSTEM SURGERY: Primary | ICD-10-CM

## 2019-11-29 DIAGNOSIS — S62.336D CLOSED DISPLACED FRACTURE OF NECK OF FIFTH METACARPAL BONE OF RIGHT HAND WITH ROUTINE HEALING, SUBSEQUENT ENCOUNTER: ICD-10-CM

## 2019-11-29 PROCEDURE — 97022 WHIRLPOOL THERAPY: CPT

## 2019-11-29 PROCEDURE — 97140 MANUAL THERAPY 1/> REGIONS: CPT

## 2019-11-29 NOTE — PROGRESS NOTES
Daily Note     Today's date: 2019  Patient name: Susan Sousa  : 1980  MRN: [id number]  Referring provider: Ravi Zeng MD  Dx:   Encounter Diagnosis     ICD-10-CM    1  Status post musculoskeletal system surgery Z98 890    2  Closed displaced fracture of neck of fifth metacarpal bone of right hand with routine healing, subsequent encounter S67 973D                   Subjective: It's the same    Objective: See treatment diary below      Assessment: Tolerated treatment well  Patient would benefit from continued OT Full PROM and good place and hold motion, difficulty with AROM  Active ext and abduction improving, however she is still weak  Instructed on LLPS splint to be worn for 30 min 3-5x a day, she verbalized understanding and showed that she can properly put the splint on  Plan: Continue per plan of care    Aggressive PROM        Precautions: S/P tenolysis and capsulotomy      Manual   11/11 11/15 11/18 11/21 11/25 11/29      PROM  15' 30' 20' 15' 15' 10'      STM   5' 5' 5'        Wound Care 15'            MEM  10'           Orthotic Instruction       10'      Intrinsic Stretching     5' 5' 5'          Exercise Diary   11/11 11/15 11/21 11/25 11/29       Progressive Grasp  3x10 isotubes 3x10 isotubes  3x10 isotubes 3x10 isotube       Opposition  Marbles 50x           Wrist Maze  10x           Active Extension  10x 15x 2x10 2x10 2x10       Fist with foam    Red 2x20 Red 2x20 Red 3x15       Place and Hold    2x15 2x15 2x15       Active Ab      2x10                                                                                                                                                                   HEP: Digit ROM   Reviewed PROM and place and holds              Modalities   11/11 11/15 11/18 11/21 11/25 11/29      MHP  15' 5' 15' 5'        NMES    8'  15'       Fluido       15'

## 2019-12-02 ENCOUNTER — APPOINTMENT (OUTPATIENT)
Dept: OCCUPATIONAL THERAPY | Facility: MEDICAL CENTER | Age: 39
End: 2019-12-02
Payer: COMMERCIAL

## 2019-12-03 ENCOUNTER — OFFICE VISIT (OUTPATIENT)
Dept: OCCUPATIONAL THERAPY | Facility: MEDICAL CENTER | Age: 39
End: 2019-12-03
Payer: COMMERCIAL

## 2019-12-03 DIAGNOSIS — S62.336D CLOSED DISPLACED FRACTURE OF NECK OF FIFTH METACARPAL BONE OF RIGHT HAND WITH ROUTINE HEALING, SUBSEQUENT ENCOUNTER: ICD-10-CM

## 2019-12-03 DIAGNOSIS — Z98.890 STATUS POST MUSCULOSKELETAL SYSTEM SURGERY: Primary | ICD-10-CM

## 2019-12-03 PROCEDURE — 97110 THERAPEUTIC EXERCISES: CPT

## 2019-12-03 PROCEDURE — 97530 THERAPEUTIC ACTIVITIES: CPT

## 2019-12-03 PROCEDURE — 97022 WHIRLPOOL THERAPY: CPT

## 2019-12-03 PROCEDURE — 97140 MANUAL THERAPY 1/> REGIONS: CPT

## 2019-12-03 NOTE — PROGRESS NOTES
Daily Note     Today's date: 12/3/2019  Patient name: Jayleen Do  : 1980  MRN: [id number]  Referring provider: Delano Armenta MD  Dx:   Encounter Diagnosis     ICD-10-CM    1  Status post musculoskeletal system surgery Z98 890    2  Closed displaced fracture of neck of fifth metacarpal bone of right hand with routine healing, subsequent encounter S62 640D                   Subjective: I feel like it moves much better after you stretch it  Objective: See treatment diary below  MCP: -2 ext, 44 flex      Assessment: Tolerated treatment well  Patient would benefit from continued OT Full PROM much better AROM post  Good motion continued throughout therapy  Continues to slowly strengthen extensors, still no active range, however continuing to get better  Plan: Continue per plan of care    Aggressive PROM        Precautions: S/P tenolysis and capsulotomy      Manual   11/11 11/15 11/18 11/21 11/25 11/29 12/3     PROM  15' 30' 20' 15' 15' 10' 15'     STM   5' 5' 5'        Wound Care 15'            MEM  10'           Orthotic Instruction       10'      Intrinsic Stretching     5' 5' 5'          Exercise Diary   11/11 11/15 11/21 11/25 11/29 12/3      Progressive Grasp  3x10 isotubes 3x10 isotubes  3x10 isotubes 3x10 isotube 3x10 isotube       Opposition  Marbles 50x           Wrist Maze  10x           Active Extension  10x 15x 2x10 2x10 2x10 30x      Fist with foam    Red 2x20 Red 2x20 Red 3x15 Red 3x15      Place and Hold    2x15 2x15 2x15 2x10      Active Ab      2x10 30x                                                                                                                                                                  HEP: Digit ROM   Reviewed PROM and place and holds              Modalities   11/11 11/15 11/18 11/21 11/25 11/29 12/3     MHP  15' 5' 15' 5'        NMES    8'  15'       Fluido       15' 10'

## 2019-12-05 ENCOUNTER — OFFICE VISIT (OUTPATIENT)
Dept: OCCUPATIONAL THERAPY | Facility: MEDICAL CENTER | Age: 39
End: 2019-12-05
Payer: COMMERCIAL

## 2019-12-05 DIAGNOSIS — Z98.890 STATUS POST MUSCULOSKELETAL SYSTEM SURGERY: Primary | ICD-10-CM

## 2019-12-05 PROCEDURE — 97530 THERAPEUTIC ACTIVITIES: CPT

## 2019-12-05 PROCEDURE — 97140 MANUAL THERAPY 1/> REGIONS: CPT

## 2019-12-05 PROCEDURE — 97022 WHIRLPOOL THERAPY: CPT

## 2019-12-05 PROCEDURE — 97110 THERAPEUTIC EXERCISES: CPT

## 2019-12-05 NOTE — PROGRESS NOTES
Daily Note     Today's date: 2019  Patient name: Mendel Bloch  : 1980  MRN: [id number]  Referring provider: Megan Sylvester MD  Dx:   Encounter Diagnosis     ICD-10-CM    1  Status post musculoskeletal system surgery A91 979                   Subjective: I went to the gym this morning for the first time in awhile, it is very sore and a little swollen today  Objective: See treatment diary below      Assessment: Tolerated treatment well  Patient would benefit from continued OT Very tight intrinsics, limiting composite fist motion  Good motion post PROM and IASTM  Reviewed instrinsic stretch and added fist with foam for intrinsics to HEP  Active ext and abduction slowly improving  Plan: Continue per plan of care    Aggressive PROM        Precautions: S/P tenolysis and capsulotomy      Manual   11/11 11/15 11/18 11/21 11/25 11/29 12/3 12/5    PROM  15' 30' 20' 15' 15' 10' 15' 10'    STM   5' 5' 5'    IASTM instrinsics 3'    Wound Care 15'            MEM  10'           Orthotic Instruction       10'      Intrinsic Stretching     5' 5' 5'  4'        Exercise Diary   11/11 11/15 11/21 11/25 11/29 12/3 12/5     Progressive Grasp  3x10 isotubes 3x10 isotubes  3x10 isotubes 3x10 isotube 3x10 isotube  3x15 smallest isotube     Opposition  Marbles 50x           Wrist Maze  10x           Active Extension  10x 15x 2x10 2x10 2x10 30x 30x     Fist with foam    Red 2x20 Red 2x20 Red 3x15 Red 3x15 Red 3x20     Place and Hold    2x15 2x15 2x15 2x10 2x10     Active Ab      2x10 30x 30x                                                                                                                                                                 HEP: Digit ROM   Reviewed PROM and place and holds              Modalities   11/11 11/15 11/18 11/21 11/25 11/29 12/3 12/5    MHP  15' 5' 15' 5'        NMES    8'  15'       Fluido       15' 10' 8'

## 2019-12-06 ENCOUNTER — EVALUATION (OUTPATIENT)
Dept: OCCUPATIONAL THERAPY | Facility: MEDICAL CENTER | Age: 39
End: 2019-12-06
Payer: COMMERCIAL

## 2019-12-06 DIAGNOSIS — Z98.890 STATUS POST MUSCULOSKELETAL SYSTEM SURGERY: Primary | ICD-10-CM

## 2019-12-06 PROCEDURE — 97140 MANUAL THERAPY 1/> REGIONS: CPT

## 2019-12-06 PROCEDURE — 97022 WHIRLPOOL THERAPY: CPT

## 2019-12-06 PROCEDURE — 97110 THERAPEUTIC EXERCISES: CPT

## 2019-12-06 PROCEDURE — 97530 THERAPEUTIC ACTIVITIES: CPT

## 2019-12-06 NOTE — PROGRESS NOTES
OT Re-Evaluation     Today's date: 2019  Patient name: Raina Person  : 1980  MRN: [id number]  Referring provider: Asuncion Rachel MD  Dx:   Encounter Diagnosis     ICD-10-CM    1  Status post musculoskeletal system surgery Z98 890                   Assessment  Assessment details: Leah Green presents in her hand based ulnar gutter splint from a fx she sustained   She experiences decreased ROM, decreased strength, and increased pain and swelling  She denies all numbness and tingling  She had a red john on the dorsal side of her SF PIP due to splint irritation, splint was adjusted for comfort and bruising  She would benefit from OT in order to increase her ROM, strength, and decrease her pain to allow her to return to ADL/IADLs  : Leah Green has increased ROM since IE, however her MCP motion continues to be decreased  She has elected to have surgery to be performed   Will complete at RE 2 days post-op  : Leah Green presents with increase ROM and strength since her RE 2 days post op  She continues to have PROM>AROM, however she is continuously making gains  She wears a dynamic flexion splint 3-5x a day for 30 min each time to help improve her motion  She notes her ADL/IADLs are becoming easier but she continues to have difficulty holding small objects in her hand as well as completing weightlifting overhead  Recommend cont OT for 3x a week for 6 weeks to allow her to continue to gain ROM and strength and allow her to meet her goals  Impairments: abnormal or restricted ROM, activity intolerance, impaired physical strength, lacks appropriate home exercise program and pain with function    Goals  STG 1: Increase SF AROM 15% in 4-6 weeks  MET  STG 2: Decrease overall pain to 5/10 in 4-6 weeks  MET  STG 3: Increase overall strength by 15% in 4-6 weeks  PROGRESSING  STG 4: Compliant with HEP in 2 weeks   MET    LTG 1: Complete all ADL/IADLs improved to prior level of function within 6-8 weeks  LTG 2: Leisure/social skills improved within 6-8 weeks  LTG 3: Work skills improved to prior level of function within 6-8 weeks    Patient Goal: To get back to working out    Goals, plan of care and treatment condition discussed with patient  Patient expresses their understanding and questions regarding these isues were addressed  New Goals:  STG 1: Increase MCP active motion by 10 degrees in 4-6 weeks      Plan  Plan details: Increase ROM  Decrease Pain  Increase Strength  Patient would benefit from: custom splinting, OT eval and skilled occupational therapy  Planned modality interventions: thermotherapy: hydrocollator packs, thermotherapy: paraffin bath, cryotherapy and fluidotherapy  Planned therapy interventions: joint mobilization, manual therapy, Loe taping, functional ROM exercises, graded activity, graded exercise, home exercise program, therapeutic activities and therapeutic exercise  Frequency: 2x week  Duration in weeks: 8  Plan of Care beginning date: 2019  Plan of Care expiration date: 2020  Treatment plan discussed with: patient        Subjective Evaluation    History of Present Illness  Date of onset: 2019  Mechanism of injury: Jerlean Gilding onto camper steps  Pain  Current pain ratin  At best pain ratin  At worst pain ratin  Quality: tingling      Hand dominance: right          Objective     Neurological Testing     Sensation     Wrist/Hand     Right   Intact: light touch    Active Range of Motion     Right Wrist   Wrist flexion: 60 degrees   Wrist extension: 60 degrees     Right Digits   Flexion   Ring     PIP: 60  Little     MCP: 50    PIP: 88    DIP: 68  Extension   Little     MCP: -4    Additional Active Range of Motion Details  Full Ab/Ad with weakness    Passive Range of Motion     Right Digits   Flexion   Little     MCP: 68    Strength/Myotome Testing     Left Wrist/Hand      (2nd hand position)     Trial 1: 45    Right Wrist/Hand      (2nd hand position)     Trial 1: 20

## 2019-12-09 ENCOUNTER — OFFICE VISIT (OUTPATIENT)
Dept: OBGYN CLINIC | Facility: MEDICAL CENTER | Age: 39
End: 2019-12-09

## 2019-12-09 ENCOUNTER — OFFICE VISIT (OUTPATIENT)
Dept: OCCUPATIONAL THERAPY | Facility: MEDICAL CENTER | Age: 39
End: 2019-12-09
Payer: COMMERCIAL

## 2019-12-09 VITALS
WEIGHT: 162 LBS | BODY MASS INDEX: 27.66 KG/M2 | SYSTOLIC BLOOD PRESSURE: 147 MMHG | HEIGHT: 64 IN | DIASTOLIC BLOOD PRESSURE: 85 MMHG | HEART RATE: 69 BPM

## 2019-12-09 DIAGNOSIS — S62.336D CLOSED DISPLACED FRACTURE OF NECK OF FIFTH METACARPAL BONE OF RIGHT HAND WITH ROUTINE HEALING, SUBSEQUENT ENCOUNTER: ICD-10-CM

## 2019-12-09 DIAGNOSIS — Z48.89 AFTERCARE FOLLOWING SURGERY: Primary | ICD-10-CM

## 2019-12-09 DIAGNOSIS — M24.541 CONTRACTURE OF HAND JOINT, RIGHT: ICD-10-CM

## 2019-12-09 DIAGNOSIS — Z98.890 STATUS POST MUSCULOSKELETAL SYSTEM SURGERY: Primary | ICD-10-CM

## 2019-12-09 PROCEDURE — 97140 MANUAL THERAPY 1/> REGIONS: CPT

## 2019-12-09 PROCEDURE — 97110 THERAPEUTIC EXERCISES: CPT

## 2019-12-09 PROCEDURE — 99024 POSTOP FOLLOW-UP VISIT: CPT | Performed by: ORTHOPAEDIC SURGERY

## 2019-12-09 PROCEDURE — 97022 WHIRLPOOL THERAPY: CPT

## 2019-12-09 NOTE — PROGRESS NOTES
CHIEF COMPLAINT:  Chief Complaint   Patient presents with    Right Little Finger - Post-op       SUBJECTIVE:  Mirta Montes De Oca is a 46y o  year old  female who presents for follow up after surgery, right hand 5th MCP joint contracture release including capsulotomy, osteophyte excision of the 5th metacarpal and extensor tenolysis performed on  11/5/2019  Patient has been attending therapy  Patient states that she does have increased motion of her small finger MCP joint  PAST MEDICAL HISTORY:  Past Medical History:   Diagnosis Date    No known health problems        PAST SURGICAL HISTORY:  Past Surgical History:   Procedure Laterality Date    NO PAST SURGERIES      ID OPEN TX METACARPAL FRACTURE SINGLE EA BONE Right 11/5/2019    Procedure: RIGHT HAND CAPSULOTOMY EXTENSOR TENOLYSIS 5TH METACARPAL;  Surgeon: Jackeline Villalta MD;  Location: [facility];  Service: Orthopedics       FAMILY HISTORY:  Family History   Problem Relation Age of Onset    No Known Problems Mother     No Known Problems Father     Diabetes Family     Heart disease Family     Breast cancer Neg Hx     Colon cancer Neg Hx     Ovarian cancer Neg Hx        SOCIAL HISTORY:  Social History     Tobacco Use    Smoking status: Never Smoker    Smokeless tobacco: Never Used   Substance Use Topics    Alcohol use: Yes     Comment: Social     Drug use: No       MEDICATIONS:  No current outpatient medications on file  ALLERGIES:  No Known Allergies    REVIEW OF SYSTEMS:  Review of Systems   Constitutional: Negative for chills, fever and unexpected weight change  HENT: Negative for hearing loss, nosebleeds and sore throat  Eyes: Negative for pain, redness and visual disturbance  Respiratory: Negative for cough, shortness of breath and wheezing  Cardiovascular: Negative for chest pain, palpitations and leg swelling  Gastrointestinal: Negative for abdominal pain, nausea and vomiting     Endocrine: Negative for polydipsia and polyuria  Genitourinary: Negative for dysuria and hematuria  Skin: Negative for rash and wound  Neurological: Negative for dizziness and headaches  Psychiatric/Behavioral: Negative for decreased concentration, dysphoric mood and suicidal ideas  The patient is not nervous/anxious  VITALS:  Vitals:    12/09/19 0926   BP: 147/85   Pulse: 69       LABS:  HgA1c: No results found for: HGBA1C  BMP: No results found for: GLUCOSE, CALCIUM, NA, K, CO2, CL, BUN, CREATININE    _____________________________________________________  PHYSICAL EXAMINATION:  General: well developed and well nourished, alert, oriented times 3 and appears comfortable  Psychiatric: Normal  HEENT: Trachea Midline, No torticollis  Pulmonary: No audible wheezing or strider  Cardiovascular: No discernable arrhythmia   Skin: No masses, erythema, lacerations, fluctation, ulcerations  Neurovascular: Sensation Intact to the Median, Ulnar, Radial Nerve, Motor Intact to the Median, Ulnar, Radial Nerve and Pulses Intact    MUSCULOSKELETAL EXAMINATION:  Right small   Flexion of MCP joint to 45deg  Stiff extension due to scar tissue   Small finger is held in a semi flexed position   ___________________________________________________  STUDIES REVIEWED:  No studies reviewed  PROCEDURES PERFORMED:  Procedures  No Procedures performed today    _____________________________________________________  ASSESSMENT/PLAN:       S/P right hand 5th MCP joint contracture release including capsulotomy, extensor tenolysis, osteophyte excision 5th metacarpal  * patient was advised that the limited flexion extension that she currently has is due to scar tissue  * pt was advised to continue to work on motion at home and in therapy  * pt was advised to apply heat prior to working on motion  * pt will follow up in the office in 3 months           Follow Up:  Return in about 3 months (around 3/9/2020)          To Do Next Visit:  Re-evaluation of current issue        Scribe Attestation    I,:   Dc Rico am acting as a scribe while in the presence of the attending physician :        I,:   Raquel Gaspar MD personally performed the services described in this documentation    as scribed in my presence :

## 2019-12-09 NOTE — PROGRESS NOTES
Daily Note     Today's date: 2019  Patient name: Travon Preston  : 1980  MRN: [id number]  Referring provider: Renata Inman MD  Dx:   Encounter Diagnosis     ICD-10-CM    1  Status post musculoskeletal system surgery Z98 890    2  Closed displaced fracture of neck of fifth metacarpal bone of right hand with routine healing, subsequent encounter S66 148D                   Subjective: It's good today, I'm ready    Objective: See treatment diary below      Assessment: Tolerated treatment well  Patient would benefit from continued OT Doing well, full PROM, Abduction and extension doing better  Plan: Continue per plan of care    Aggressive PROM        Precautions: S/P tenolysis and capsulotomy      Manual  12/9 11/11 11/15 11/18 11/21 11/25 11/29 12/3 12/5 12/6   PROM 10' 15' 30' 20' 15' 15' 10' 15' 10' 10'   STM IASTM 5'  5' 5' 5'    IASTM instrinsics 3' IASTM 5'   Wound Care             MEM  10'           Orthotic Instruction       10'      Intrinsic Stretching 2'    5' 5' 5'  4' 2'       Exercise Diary   11/11 11/15 11/21 11/25 11/29 12/3 12/5 12/6 12/9   Progressive Grasp  3x10 isotubes 3x10 isotubes  3x10 isotubes 3x10 isotube 3x10 isotube  3x15 smallest isotube 3x15 smallest isotube 3x15 smallest isotube  '   Opposition  Marbles 50x           Wrist Maze  10x           Active Extension  10x 15x 2x10 2x10 2x10 30x 30x 30x    Fist with foam    Red 2x20 Red 2x20 Red 3x15 Red 3x15 Red 3x20 Red 3x15    Place and Hold    2x15 2x15 2x15 2x10 2x10     Active Ab      2x10 30x 30x 30x    Translation         Marbles 30x                                                                                                                                                   HEP: Digit ROM   Reviewed PROM and place and holds              Modalities   11/11 11/15 11/18 11/21 11/25 11/29 12/3 12/5 12/6   MHP  15' 5' 15' 5'        NMES    8'  15'       Fluido       15' 10' 8' 15'

## 2019-12-12 ENCOUNTER — OFFICE VISIT (OUTPATIENT)
Dept: OCCUPATIONAL THERAPY | Facility: MEDICAL CENTER | Age: 39
End: 2019-12-12
Payer: COMMERCIAL

## 2019-12-12 DIAGNOSIS — Z98.890 STATUS POST MUSCULOSKELETAL SYSTEM SURGERY: Primary | ICD-10-CM

## 2019-12-12 DIAGNOSIS — S62.336D CLOSED DISPLACED FRACTURE OF NECK OF FIFTH METACARPAL BONE OF RIGHT HAND WITH ROUTINE HEALING, SUBSEQUENT ENCOUNTER: ICD-10-CM

## 2019-12-12 PROCEDURE — 97022 WHIRLPOOL THERAPY: CPT

## 2019-12-12 PROCEDURE — 97110 THERAPEUTIC EXERCISES: CPT

## 2019-12-12 PROCEDURE — 97140 MANUAL THERAPY 1/> REGIONS: CPT

## 2019-12-12 PROCEDURE — 97530 THERAPEUTIC ACTIVITIES: CPT

## 2019-12-12 NOTE — PROGRESS NOTES
Daily Note     Today's date: 2019  Patient name: Asha Ni  : 1980  MRN: [id number]  Referring provider: Adi Nelson MD  Dx:   Encounter Diagnosis     ICD-10-CM    1  Status post musculoskeletal system surgery Z98 890    2  Closed displaced fracture of neck of fifth metacarpal bone of right hand with routine healing, subsequent encounter S66 873D                   Subjective: This week is the best week I've had! Objective: See treatment diary below      Assessment: Tolerated treatment well  Patient would benefit from continued OT Extension continues to be limited by scar tissue, continue IASTM  Motion continues to steadily improve  Plan: Continue per plan of care    Aggressive PROM        Precautions: S/P tenolysis and capsulotomy      Manual  12/9 11/11 11/15 11/18 11/21 11/25 11/29 12/3 12/5 12/6   PROM 10' 15' 30' 20' 15' 15' 10' 15' 10' 10'   STM IASTM 5'  5' 5' 5'    IASTM instrinsics 3' IASTM 5'   Wound Care             MEM  10'           Orthotic Instruction       10'      Intrinsic Stretching 2'    5' 5' 5'  4' 2'       Exercise Diary  12/12 11/11 11/15 11/21 11/25 11/29 12/3 12/5 12/6 12/9   Progressive Grasp 3x15 smallest isotube 3x10 isotubes 3x10 isotubes  3x10 isotubes 3x10 isotube 3x10 isotube  3x15 smallest isotube 3x15 smallest isotube 3x15 smallest isotube  '   Opposition  Marbles 50x           Wrist Maze  10x           Active Extension  10x 15x 2x10 2x10 2x10 30x 30x 30x    Fist with foam Red 3x15   Red 2x20 Red 2x20 Red 3x15 Red 3x15 Red 3x20 Red 3x15    Place and Hold    2x15 2x15 2x15 2x10 2x10     Active Ab 30x     2x10 30x 30x 30x    Translation Marbles 30x        Marbles 30x                                                                                                                                                   HEP: Digit ROM   Reviewed PROM and place and holds              Modalities  12/12 11/11 11/15 11/18 11/21 11/25 11/29 12/3 12/5 12/6   MHP  15' 5' 15' 5'        NMES    8'  15'       Fluido 10'      15' 10' 8' 15'

## 2019-12-13 ENCOUNTER — OFFICE VISIT (OUTPATIENT)
Dept: OCCUPATIONAL THERAPY | Facility: MEDICAL CENTER | Age: 39
End: 2019-12-13
Payer: COMMERCIAL

## 2019-12-13 DIAGNOSIS — S62.336D CLOSED DISPLACED FRACTURE OF NECK OF FIFTH METACARPAL BONE OF RIGHT HAND WITH ROUTINE HEALING, SUBSEQUENT ENCOUNTER: ICD-10-CM

## 2019-12-13 DIAGNOSIS — Z98.890 STATUS POST MUSCULOSKELETAL SYSTEM SURGERY: Primary | ICD-10-CM

## 2019-12-13 PROCEDURE — 97110 THERAPEUTIC EXERCISES: CPT

## 2019-12-13 PROCEDURE — 97140 MANUAL THERAPY 1/> REGIONS: CPT

## 2019-12-13 PROCEDURE — 97022 WHIRLPOOL THERAPY: CPT

## 2019-12-13 PROCEDURE — 97530 THERAPEUTIC ACTIVITIES: CPT

## 2019-12-13 NOTE — PROGRESS NOTES
Daily Note     Today's date: 2019  Patient name: Rebecca Brooke  : 1980  MRN: [id number]  Referring provider: Zeina Jolly MD  Dx:   Encounter Diagnosis     ICD-10-CM    1  Status post musculoskeletal system surgery Z98 890    2  Closed displaced fracture of neck of fifth metacarpal bone of right hand with routine healing, subsequent encounter S64 294D                   Subjective: I lifted yesterday, and it was really only the last exercise that bothered me    Objective: See treatment diary below      Assessment: Tolerated treatment well  Patient would benefit from continued OT Added new PREs today to improve functional tasks and begin strengthening  Full PROM with slight pain noted  Continuing to progress well  Plan: Continue per plan of care    Aggressive PROM        Precautions: S/P tenolysis and capsulotomy      Manual  12/9 12/12 11/15 11/18 11/21 11/25 11/29 12/3 12/5 12/6   PROM 10' 10' 30' 20' 15' 15' 10' 15' 10' 10'   STM IASTM 5' IASTM 5' 5' 5' 5'    IASTM instrinsics 3' IASTM 5'   Wound Care             MEM             Orthotic Instruction       10'      Intrinsic Stretching 2'    5' 5' 5'  4' 2'       Exercise Diary  12/12 12/13 11/15 11/21 11/25 11/29 12/3 12/5 12/6 12/9   Progressive Grasp 3x15 smallest isotube 3x15 smallest isotube 3x10 isotubes  3x10 isotubes 3x10 isotube 3x10 isotube  3x15 smallest isotube 3x15 smallest isotube 3x15 smallest isotube  '   Opposition             Wrist Maze             Active Extension   15x 2x10 2x10 2x10 30x 30x 30x    Fist with foam Red 3x15 Red 3x20  Red 2x20 Red 2x20 Red 3x15 Red 3x15 Red 3x20 Red 3x15    Place and Hold    2x15 2x15 2x15 2x10 2x10     Active Ab 30x 30x with marbles    2x10 30x 30x 30x    Translation Marbles 30x Marbles 50x       Marbles 30x    Intrinsic strengthening  tweezer block stacking 4x3 blocks HEP: Digit ROM   Reviewed PROM and place and holds              Modalities  12/12 12/13 11/15 11/18 11/21 11/25 11/29 12/3 12/5 12/6   MHP   5' 15' 5'        NMES    8'  15'       Fluido 10' 8'     15' 10' 8' 15'

## 2019-12-16 ENCOUNTER — OFFICE VISIT (OUTPATIENT)
Dept: OCCUPATIONAL THERAPY | Facility: MEDICAL CENTER | Age: 39
End: 2019-12-16
Payer: COMMERCIAL

## 2019-12-16 DIAGNOSIS — S62.336D CLOSED DISPLACED FRACTURE OF NECK OF FIFTH METACARPAL BONE OF RIGHT HAND WITH ROUTINE HEALING, SUBSEQUENT ENCOUNTER: ICD-10-CM

## 2019-12-16 DIAGNOSIS — Z98.890 STATUS POST MUSCULOSKELETAL SYSTEM SURGERY: Primary | ICD-10-CM

## 2019-12-16 PROCEDURE — 97112 NEUROMUSCULAR REEDUCATION: CPT

## 2019-12-16 PROCEDURE — 97018 PARAFFIN BATH THERAPY: CPT

## 2019-12-16 PROCEDURE — 97140 MANUAL THERAPY 1/> REGIONS: CPT

## 2019-12-16 PROCEDURE — 97530 THERAPEUTIC ACTIVITIES: CPT

## 2019-12-16 PROCEDURE — 97110 THERAPEUTIC EXERCISES: CPT

## 2019-12-16 NOTE — PROGRESS NOTES
Daily Note     Today's date: 2019  Patient name: Annette Burciaga  : 1980  MRN: [id number]  Referring provider: Meghana Leary MD  Dx:   Encounter Diagnosis     ICD-10-CM    1  Status post musculoskeletal system surgery Z98 890    2  Closed displaced fracture of neck of fifth metacarpal bone of right hand with routine healing, subsequent encounter S68 012D                   Subjective: Oh, it's sore today    Objective: See treatment diary below      Assessment: Tolerated treatment well  Patient would benefit from continued OT Good motion today, began strengthening today, tolerated very well  Plan: Continue per plan of care    Aggressive PROM        Precautions: S/P tenolysis and capsulotomy      Manual  12/9 12/12 12/16 11/18 11/21 11/25 11/29 12/3 12/5 12/6   PROM 10' 10' 10' 20' 15' 15' 10' 15' 10' 10'   STM IASTM 5' IASTM 5' IASTM5' 5' 5'    IASTM instrinsics 3' IASTM 5'   Wound Care             MEM             Orthotic Instruction       10'      Intrinsic Stretching 2'    5' 5' 5'  4' 2'       Exercise Diary  12/12 12/13 12/16 11/21 11/25 11/29 12/3 12/5 12/6 12/9   Progressive Grasp 3x15 smallest isotube 3x15 smallest isotube 3x15 small isotube  3x10 isotubes 3x10 isotube 3x10 isotube  3x15 smallest isotube 3x15 smallest isotube 3x15 smallest isotube  '   Opposition             Wrist Maze             Active Extension    2x10 2x10 2x10 30x 30x 30x    Fist with foam Red 3x15 Red 3x20 Red 3x15 Red 2x20 Red 2x20 Red 3x15 Red 3x15 Red 3x20 Red 3x15    Place and Hold    2x15 2x15 2x15 2x10 2x10     Active Ab 30x 30x with marbles 30x with marbles   2x10 30x 30x 30x    Translation Marbles 30x Marbles 50x Marbles 50x      Marbles 30x    Intrinsic strengthening  tweezer block stacking 4x3 blocks Tweezer block stacking 4x 3 blocks          Gross Grasp   3x15 RPW                                                                                                                               HEP: Digit ROM Reviewed PROM and place and holds              Modalities  12/12 12/13 12/16 11/18 11/21 11/25 11/29 12/3 12/5 12/6   MHP    15' 5'        NMES    8'  15'       Fluido 10' 8'     15' 10' 8' 15'   Paraffin   15' in fist

## 2019-12-17 ENCOUNTER — OFFICE VISIT (OUTPATIENT)
Dept: OCCUPATIONAL THERAPY | Facility: MEDICAL CENTER | Age: 39
End: 2019-12-17
Payer: COMMERCIAL

## 2019-12-17 DIAGNOSIS — S62.336D CLOSED DISPLACED FRACTURE OF NECK OF FIFTH METACARPAL BONE OF RIGHT HAND WITH ROUTINE HEALING, SUBSEQUENT ENCOUNTER: ICD-10-CM

## 2019-12-17 DIAGNOSIS — Z98.890 STATUS POST MUSCULOSKELETAL SYSTEM SURGERY: Primary | ICD-10-CM

## 2019-12-17 PROCEDURE — 97018 PARAFFIN BATH THERAPY: CPT

## 2019-12-17 PROCEDURE — 97140 MANUAL THERAPY 1/> REGIONS: CPT

## 2019-12-17 PROCEDURE — 97530 THERAPEUTIC ACTIVITIES: CPT

## 2019-12-17 PROCEDURE — 97110 THERAPEUTIC EXERCISES: CPT

## 2019-12-17 PROCEDURE — 97112 NEUROMUSCULAR REEDUCATION: CPT

## 2019-12-17 NOTE — PROGRESS NOTES
Daily Note     Today's date: 2019  Patient name: Jona Brooke  : 1980  MRN: [id number]  Referring provider: Duane Randy, MD  Dx:   Encounter Diagnosis     ICD-10-CM    1  Status post musculoskeletal system surgery Z98 890    2  Closed displaced fracture of neck of fifth metacarpal bone of right hand with routine healing, subsequent encounter S64 476D                   Subjective: My finger just would not wrap around the bar yesterday I had to use a towel to get a good   Objective: See treatment diary below      Assessment: Tolerated treatment well  Patient would benefit from continued OT Motion progressing well, radial deviation/crossover of the finger noted during active fisting  Educated on awareness and exercises to be done to help this  Plan: Continue per plan of care    Aggressive PROM        Precautions: S/P tenolysis and capsulotomy      Manual  12/9 12/12 12/16 12/17 11/21 11/25 11/29 12/3 12/5 12/6   PROM 10' 10' 10' 10' 15' 15' 10' 15' 10' 10'   STM IASTM 5' IASTM 5' IASTM5' 5' IASTM 5'    IASTM instrinsics 3' IASTM 5'   Wound Care             MEM             Orthotic Instruction       10'      Intrinsic Stretching 2'    5' 5' 5'  4' 2'       Exercise Diary  12/12 12/13 12/16 12/17 11/25 11/29 12/3 12/5 12/6 12/9   Progressive Grasp 3x15 smallest isotube 3x15 smallest isotube 3x15 small isotube  3x10 isotubes 3x10 isotube 3x10 isotube  3x15 smallest isotube 3x15 smallest isotube 3x15 smallest isotube  '   Opposition             Wrist Maze             Active Extension     2x10 2x10 30x 30x 30x    Fist with foam Red 3x15 Red 3x20 Red 3x15 Red 2x20 Red 2x20 Red 3x15 Red 3x15 Red 3x20 Red 3x15    Place and Hold     2x15 2x15 2x10 2x10     Active Ab 30x 30x with marbles 30x with marbles 30x with marbles  2x10 30x 30x 30x    Translation Marbles 30x Marbles 50x Marbles 50x      Marbles 30x    Intrinsic strengthening  tweezer block stacking 4x3 blocks Tweezer block stacking 4x 3 blocks Tweezers block stacking 4x 3 blocks and 3x 4 blocks         Gross Grasp   3x15 RPW 3x15 RPW power and cylindrical         Digiflex    yellow 2x10                                                                                                                 HEP: Digit ROM   Reviewed PROM and place and holds              Modalities  12/12 12/13 12/16 12/17 11/21 11/25 11/29 12/3 12/5 12/6   MHP     5'        NMES      15'       Fluido 10' 8'     15' 10' 8' 15'   Paraffin   15' in fist 10' in fist

## 2019-12-20 ENCOUNTER — OFFICE VISIT (OUTPATIENT)
Dept: OCCUPATIONAL THERAPY | Facility: MEDICAL CENTER | Age: 39
End: 2019-12-20
Payer: COMMERCIAL

## 2019-12-20 DIAGNOSIS — Z98.890 STATUS POST MUSCULOSKELETAL SYSTEM SURGERY: Primary | ICD-10-CM

## 2019-12-20 DIAGNOSIS — S62.336D CLOSED DISPLACED FRACTURE OF NECK OF FIFTH METACARPAL BONE OF RIGHT HAND WITH ROUTINE HEALING, SUBSEQUENT ENCOUNTER: ICD-10-CM

## 2019-12-20 PROCEDURE — 97018 PARAFFIN BATH THERAPY: CPT

## 2019-12-20 PROCEDURE — 97140 MANUAL THERAPY 1/> REGIONS: CPT

## 2019-12-20 PROCEDURE — 97110 THERAPEUTIC EXERCISES: CPT

## 2019-12-20 PROCEDURE — 97530 THERAPEUTIC ACTIVITIES: CPT

## 2019-12-20 NOTE — PROGRESS NOTES
Daily Note     Today's date: 2019  Patient name: Javad Grimm  : 1980  MRN: [id number]  Referring provider: Donita López MD  Dx:   Encounter Diagnosis     ICD-10-CM    1  Status post musculoskeletal system surgery Z98 890    2  Closed displaced fracture of neck of fifth metacarpal bone of right hand with routine healing, subsequent encounter S67 846D                   Subjective: It's sore, I always over do it    Objective: See treatment diary below      Assessment: Tolerated treatment well  Patient would benefit from continued OT Motion progressing well, Continue as tolerated    Plan: Continue per plan of care    Aggressive PROM        Precautions: S/P tenolysis and capsulotomy      Manual  12/9 12/12 12/16 12/17 11/21 11/25 11/29 12/3 12/5 12/6   PROM 10' 10' 10' 10' 15' 15' 10' 15' 10' 10'   STM IASTM 5' IASTM 5' IASTM5' 5' IASTM 5'    IASTM instrinsics 3' IASTM 5'   Wound Care             MEM             Orthotic Instruction       10'      Intrinsic Stretching 2'    5' 5' 5'  4' 2'       Exercise Diary  12/12 12/13 12/16 12/17 12/20 11/29 12/3 12/5 12/6 12/9   Progressive Grasp 3x15 smallest isotube 3x15 smallest isotube 3x15 small isotube  3x15 isotubes 3x10 isotube 3x10 isotube  3x15 smallest isotube 3x15 smallest isotube 3x15 smallest isotube  '   Opposition             Wrist Maze             Active Extension     2x10 2x10 30x 30x 30x    Fist with foam Red 3x15 Red 3x20 Red 3x15 Red 2x20 Red 2x20 Red 3x15 Red 3x15 Red 3x20 Red 3x15    Place and Hold     2x15 2x15 2x10 2x10     Active Ab 30x 30x with marbles 30x with marbles 30x with marbles 30x marbles 2x10 30x 30x 30x    Translation Marbles 30x Marbles 50x Marbles 50x      Marbles 30x    Intrinsic strengthening  tweezer block stacking 4x3 blocks Tweezer block stacking 4x 3 blocks Tweezers block stacking 4x 3 blocks and 3x 4 blocks Tweezers box stacking        Gross Grasp   3x15 RPW 3x15 RPW power and cylindrical 3x10 RPW        Digiflex yellow 2x10         Pinch with SF     Yellow, marbles                                                                                                   HEP: Digit ROM   Reviewed PROM and place and holds              Modalities  12/12 12/13 12/16 12/17 11/21 11/25 11/29 12/3 12/5 12/6   MHP     5'        NMES      15'       Fluido 10' 8'     15' 10' 8' 15'   Paraffin   15' in fist 10' in fist

## 2019-12-23 ENCOUNTER — APPOINTMENT (OUTPATIENT)
Dept: OCCUPATIONAL THERAPY | Facility: MEDICAL CENTER | Age: 39
End: 2019-12-23
Payer: COMMERCIAL

## 2019-12-24 ENCOUNTER — APPOINTMENT (OUTPATIENT)
Dept: OCCUPATIONAL THERAPY | Facility: MEDICAL CENTER | Age: 39
End: 2019-12-24
Payer: COMMERCIAL

## 2019-12-27 ENCOUNTER — OFFICE VISIT (OUTPATIENT)
Dept: OCCUPATIONAL THERAPY | Facility: MEDICAL CENTER | Age: 39
End: 2019-12-27
Payer: COMMERCIAL

## 2019-12-27 DIAGNOSIS — Z98.890 STATUS POST MUSCULOSKELETAL SYSTEM SURGERY: Primary | ICD-10-CM

## 2019-12-27 PROCEDURE — 97140 MANUAL THERAPY 1/> REGIONS: CPT

## 2019-12-27 PROCEDURE — 97110 THERAPEUTIC EXERCISES: CPT

## 2019-12-27 PROCEDURE — 97530 THERAPEUTIC ACTIVITIES: CPT

## 2019-12-27 PROCEDURE — 97018 PARAFFIN BATH THERAPY: CPT

## 2019-12-27 NOTE — PROGRESS NOTES
Daily Note     Today's date: 2019  Patient name: Mirta Montes De Oca  : 1980  MRN: [id number]  Referring provider: Debbie Olivares MD  Dx:   Encounter Diagnosis     ICD-10-CM    1  Status post musculoskeletal system surgery T22 003                   Subjective: I got a lot of therapy by wrapping presents    Objective: See treatment diary below      Assessment: Tolerated treatment well  Patient would benefit from continued OT Doing very well, will continue strengthening as tolerated  Plan: Continue per plan of care    Aggressive PROM        Precautions: S/P tenolysis and capsulotomy      Manual  12/9 12/12 12/16 12/17 12/27 11/25 11/29 12/3 12/5 12/6   PROM 10' 10' 10' 10' 10' 15' 10' 15' 10' 10'   STM IASTM 5' IASTM 5' IASTM5' 5' IASTM 5'    IASTM instrinsics 3' IASTM 5'   Wound Care             MEM             Orthotic Instruction       10'      Intrinsic Stretching 2'     5' 5'  4' 2'       Exercise Diary  12/12 12/13 12/16 12/17 12/20 12/27 12/3 12/5 12/6 12/9   Progressive Grasp 3x15 smallest isotube 3x15 smallest isotube 3x15 small isotube  3x15 isotubes 3x15 small isotube 3x10 isotube  3x15 smallest isotube 3x15 smallest isotube 3x15 smallest isotube  '   Opposition             Wrist Maze             Active Extension     2x10  30x 30x 30x    Fist with foam Red 3x15 Red 3x20 Red 3x15 Red 2x20 Red 2x20 Red 3x15 Red 3x15 Red 3x20 Red 3x15    Place and Hold     2x15  2x10 2x10     Active Ab 30x 30x with marbles 30x with marbles 30x with marbles 30x marbles  30x 30x 30x    Translation Marbles 30x Marbles 50x Marbles 50x      Marbles 30x    Intrinsic strengthening  tweezer block stacking 4x3 blocks Tweezer block stacking 4x 3 blocks Tweezers block stacking 4x 3 blocks and 3x 4 blocks Tweezers box stacking        Gross Grasp   3x15 RPW 3x15 RPW power and cylindrical 3x10 RPW 3x10 RPW, x10 GPW       Digiflex    yellow 2x10         Pinch with SF     Yellow, marbles Yellow marbles        Pinch Ring      RF and SF Y/R 2x       EDC      Jar 1x                                                                        HEP: Digit ROM   Reviewed PROM and place and holds              Modalities  12/12 12/13 12/16 12/17 12/27 11/25 11/29 12/3 12/5 12/6   MHP             NMES      15'       Fluido 10' 8'     15' 10' 8' 15'   Paraffin   15' in fist 10' in fist 10' in fist

## 2019-12-30 ENCOUNTER — OFFICE VISIT (OUTPATIENT)
Dept: OCCUPATIONAL THERAPY | Facility: MEDICAL CENTER | Age: 39
End: 2019-12-30
Payer: COMMERCIAL

## 2019-12-30 DIAGNOSIS — S62.336D CLOSED DISPLACED FRACTURE OF NECK OF FIFTH METACARPAL BONE OF RIGHT HAND WITH ROUTINE HEALING, SUBSEQUENT ENCOUNTER: Primary | ICD-10-CM

## 2019-12-30 DIAGNOSIS — Z98.890 STATUS POST MUSCULOSKELETAL SYSTEM SURGERY: ICD-10-CM

## 2019-12-30 PROCEDURE — 97140 MANUAL THERAPY 1/> REGIONS: CPT

## 2019-12-30 PROCEDURE — 97110 THERAPEUTIC EXERCISES: CPT

## 2019-12-30 PROCEDURE — 97018 PARAFFIN BATH THERAPY: CPT

## 2019-12-30 NOTE — PROGRESS NOTES
Daily Note     Today's date: 2019  Patient name: Viri Garcia  : 1980  MRN: [id number]  Referring provider: Nico Crowe MD  Dx:   Encounter Diagnosis     ICD-10-CM    1  Closed displaced fracture of neck of fifth metacarpal bone of right hand with routine healing, subsequent encounter S62 336D    2  Status post musculoskeletal system surgery Z98 890                   Subjective: Everyone is surprised at how well I am doing    Objective: See treatment diary below      Assessment: Tolerated treatment well  Patient would benefit from continued OT Doing very well, will continue strengthening as tolerated  Plan: Continue per plan of care    Aggressive PROM        Precautions: S/P tenolysis and capsulotomy      Manual  12/9 12/12 12/16 12/17 12/27 12/30 11/29 12/3 12/5 12/6   PROM 10' 10' 10' 10' 10' 10' 10' 15' 10' 10'   STM IASTM 5' IASTM 5' IASTM5' 5' IASTM 5' 5'   IASTM instrinsics 3' IASTM 5'   Wound Care             MEM             Orthotic Instruction       10'      Intrinsic Stretching 2'      5'  4' 2'       Exercise Diary     Progressive Grasp 3x15 smallest isotube 3x15 smallest isotube 3x15 small isotube  3x15 isotubes 3x15 small isotube  3x15 smallest isotube 3x15 smallest isotube 3x15 smallest isotube  '   Opposition             Wrist Maze             Active Extension     2x10   30x 30x    Fist with foam Red 3x15 Red 3x20 Red 3x15 Red 2x20 Red 2x20 Red 3x15 Red 3x15 Red 3x20 Red 3x15    Place and Hold     2x15   2x10     Active Ab 30x 30x with marbles 30x with marbles 30x with marbles 30x marbles   30x 30x    Translation Marbles 30x Marbles 50x Marbles 50x      Marbles 30x    Intrinsic strengthening  tweezer block stacking 4x3 blocks Tweezer block stacking 4x 3 blocks Tweezers block stacking 4x 3 blocks and 3x 4 blocks Tweezers box stacking        Gross Grasp   3x15 RPW 3x15 RPW power and cylindrical 3x10 RPW 3x10 RPW, x10 GPW 3x10 RPW and 3x10 GPW       Digiflex    yellow 2x10         Pinch with SF     Yellow, marbles Yellow marbles  Red Marbles      Pinch Ring      RF and SF Y/R 2x RF and SF Y/R 2x      EDC      Jar 1x Jar 1x                                                                       HEP: Digit ROM   Reviewed PROM and place and holds              Modalities  12/12 12/13 12/16 12/17 12/27 12/30 11/29 12/3 12/5 12/6   MHP             NMES             Fluido 10' 8'     15' 10' 8' 15'   Paraffin   15' in fist 10' in fist 10' in fist 15' in fist

## 2020-01-03 ENCOUNTER — EVALUATION (OUTPATIENT)
Dept: OCCUPATIONAL THERAPY | Facility: MEDICAL CENTER | Age: 40
End: 2020-01-03
Payer: COMMERCIAL

## 2020-01-03 DIAGNOSIS — S62.336D CLOSED DISPLACED FRACTURE OF NECK OF FIFTH METACARPAL BONE OF RIGHT HAND WITH ROUTINE HEALING, SUBSEQUENT ENCOUNTER: Primary | ICD-10-CM

## 2020-01-03 DIAGNOSIS — Z98.890 STATUS POST MUSCULOSKELETAL SYSTEM SURGERY: ICD-10-CM

## 2020-01-03 PROCEDURE — 97110 THERAPEUTIC EXERCISES: CPT

## 2020-01-03 PROCEDURE — 97018 PARAFFIN BATH THERAPY: CPT

## 2020-01-03 PROCEDURE — 97140 MANUAL THERAPY 1/> REGIONS: CPT

## 2020-01-03 NOTE — PROGRESS NOTES
Daily Note     Today's date: 1/3/2020  Patient name: Travon Preston  : 1980  MRN: [id number]  Referring provider: Renata Inman MD  Dx:   Encounter Diagnosis     ICD-10-CM    1  Closed displaced fracture of neck of fifth metacarpal bone of right hand with routine healing, subsequent encounter S62 336D    2  Status post musculoskeletal system surgery Z98 890                   Subjective: Everyone is surprised at how well I am doing    Objective: See treatment diary below      Assessment: Tolerated treatment well  Patient would benefit from continued OT See RE    Plan: Continue per plan of care    Aggressive PROM        Precautions: S/P tenolysis and capsulotomy      Manual  12/9 12/12 12/16 12/17 12/27 12/30 1/3 12/3 12/5 12/6   PROM 10' 10' 10' 10' 10' 10' 10' 15' 10' 10'   STM IASTM 5' IASTM 5' IASTM5' 5' IASTM 5' 5' 5'  IASTM instrinsics 3' IASTM 5'   Wound Care             MEM             Orthotic Instruction             Intrinsic Stretching 2'        4' 2'       Exercise Diary  12/12 12/13 12/16 12/17 12/20 12/27 12/30 1/3 12/6 12/9   Progressive Grasp 3x15 smallest isotube 3x15 smallest isotube 3x15 small isotube  3x15 isotubes 3x15 small isotube   3x15 smallest isotube 3x15 smallest isotube  '   Opposition             Wrist Maze             Active Extension     2x10    30x    Fist with foam Red 3x15 Red 3x20 Red 3x15 Red 2x20 Red 2x20 Red 3x15 Red 3x15  Red 3x15    Place and Hold     2x15        Active Ab 30x 30x with marbles 30x with marbles 30x with marbles 30x marbles   Yellow Putty 3x10 30x    Translation Marbles 30x Marbles 50x Marbles 50x      Marbles 30x    Intrinsic strengthening  tweezer block stacking 4x3 blocks Tweezer block stacking 4x 3 blocks Tweezers block stacking 4x 3 blocks and 3x 4 blocks Tweezers box stacking        Gross Grasp   3x15 RPW 3x15 RPW power and cylindrical 3x10 RPW 3x10 RPW, x10 GPW 3x10 RPW and 3x10 GPW  3x10 RPW, 3x10 GPW     Digiflex    yellow 2x10         Pinch with SF     Yellow, marbles Yellow marbles  Red Marbles Red Marbles     Pinch Ring      RF and SF Y/R 2x RF and SF Y/R 2x      EDC      Jar 1x Jar 1x Jar 1x     Pegs        Red in, 25# out                                                         HEP: Digit ROM   Reviewed PROM and place and holds              Modalities  12/12 12/13 12/16 12/17 12/27 12/30 1/3 12/3 12/5 12/6   MHP             NMES             Fluido 10' 8'      10' 8' 15'   Paraffin   15' in fist 10' in fist 10' in fist 15' in fist 10' in fist

## 2020-01-03 NOTE — PROGRESS NOTES
OT Re-Evaluation     Today's date: 1/3/2020  Patient name: Jesse Feldman  : 1980  MRN: [id number]  Referring provider: Carol Sabillon MD  Dx:   Encounter Diagnosis     ICD-10-CM    1  Closed displaced fracture of neck of fifth metacarpal bone of right hand with routine healing, subsequent encounter S62 336D    2  Status post musculoskeletal system surgery Z98 [id number]                   Assessment  Assessment details: Mary Guillermo presents in her hand based ulnar gutter splint from a fx she sustained   She experiences decreased ROM, decreased strength, and increased pain and swelling  She denies all numbness and tingling  She had a red john on the dorsal side of her SF PIP due to splint irritation, splint was adjusted for comfort and bruising  She would benefit from OT in order to increase her ROM, strength, and decrease her pain to allow her to return to ADL/IADLs  : Mary Guillermo has increased ROM since IE, however her MCP motion continues to be decreased  She has elected to have surgery to be performed   Will complete at RE 2 days post-op  : Mary Guillermo presents with increase ROM and strength since her RE 2 days post op  She continues to have PROM>AROM, however she is continuously making gains  She wears a dynamic flexion splint 3-5x a day for 30 min each time to help improve her motion  She notes her ADL/IADLs are becoming easier but she continues to have difficulty holding small objects in her hand as well as completing weightlifting overhead  Recommend cont OT for 3x a week for 6 weeks to allow her to continue to gain ROM and strength and allow her to meet her goals  1/3: Mary Guillermo presents with WFL ROM, PROM continues to be greater than AROM, however she only notes a decrease in endurance with any activity with a prolonged fist  She would like to continue therapy for a few weeks 1x a week to increase strength and endurance        Impairments: abnormal or restricted ROM, activity intolerance, impaired physical strength, lacks appropriate home exercise program and pain with function    Goals  STG 1: Increase SF AROM 15% in 4-6 weeks  MET  STG 2: Decrease overall pain to 5/10 in 4-6 weeks  MET  STG 3: Increase overall strength by 15% in 4-6 weeks  PROGRESSING  STG 4: Compliant with HEP in 2 weeks  MET    LTG 1: Complete all ADL/IADLs improved to prior level of function within 6-8 weeks  LTG 2: Leisure/social skills improved within 6-8 weeks  LTG 3: Work skills improved to prior level of function within 6-8 weeks    Patient Goal: To get back to working out    Goals, plan of care and treatment condition discussed with patient  Patient expresses their understanding and questions regarding these isues were addressed  New Goals:  STG 1: Increase MCP active motion by 10 degrees in 4-6 weeks MET      Plan  Plan details: Increase ROM  Decrease Pain  Increase Strength  Patient would benefit from: custom splinting, OT eval and skilled occupational therapy  Planned modality interventions: thermotherapy: hydrocollator packs, thermotherapy: paraffin bath, cryotherapy and fluidotherapy  Planned therapy interventions: joint mobilization, manual therapy, Leo taping, functional ROM exercises, graded activity, graded exercise, home exercise program, therapeutic activities and therapeutic exercise  Frequency: 2x week  Duration in weeks: 6  Plan of Care beginning date: 1/3/2020  Plan of Care expiration date: 2020  Treatment plan discussed with: patient        Subjective Evaluation    History of Present Illness  Date of onset: 2019  Mechanism of injury: Fell onto camper steps  Pain  Current pain ratin  At best pain ratin  At worst pain ratin  Quality: tingling      Hand dominance: right          Objective     Neurological Testing     Sensation     Wrist/Hand     Right   Intact: light touch    Active Range of Motion     Right Wrist   Wrist flexion: 60 degrees   Wrist extension: 60 degrees     Right Digits   Flexion   Little     MCP: 60    PIP: 88    DIP: 70  Extension   Little     MCP: 0    Passive Range of Motion     Right Digits   Flexion   Little     MCP: 68    Strength/Myotome Testing     Left Wrist/Hand      (2nd hand position)     Trial 1: 39 1    Right Wrist/Hand      (2nd hand position)     Trial 1: 27

## 2020-01-10 ENCOUNTER — OFFICE VISIT (OUTPATIENT)
Dept: OCCUPATIONAL THERAPY | Facility: MEDICAL CENTER | Age: 40
End: 2020-01-10
Payer: COMMERCIAL

## 2020-01-10 DIAGNOSIS — Z98.890 STATUS POST MUSCULOSKELETAL SYSTEM SURGERY: ICD-10-CM

## 2020-01-10 DIAGNOSIS — S62.336D CLOSED DISPLACED FRACTURE OF NECK OF FIFTH METACARPAL BONE OF RIGHT HAND WITH ROUTINE HEALING, SUBSEQUENT ENCOUNTER: Primary | ICD-10-CM

## 2020-01-10 PROCEDURE — 97110 THERAPEUTIC EXERCISES: CPT

## 2020-01-10 PROCEDURE — 97140 MANUAL THERAPY 1/> REGIONS: CPT

## 2020-01-10 PROCEDURE — 97018 PARAFFIN BATH THERAPY: CPT

## 2020-01-10 PROCEDURE — 97530 THERAPEUTIC ACTIVITIES: CPT

## 2020-01-10 NOTE — PROGRESS NOTES
Daily Note     Today's date: 1/10/2020  Patient name: Jona Brooke  : 1980  MRN: [id number]  Referring provider: Duane Randy, MD  Dx:   Encounter Diagnosis     ICD-10-CM    1  Closed displaced fracture of neck of fifth metacarpal bone of right hand with routine healing, subsequent encounter S62 336D    2  Status post musculoskeletal system surgery Z98 [id number]                   Subjective: It's about the same    Objective: See treatment diary below      Assessment: Tolerated treatment well  Patient would benefit from continued OT Very good motion, near full when she presented  Added isometric digit ext to HEP  1 more visit then D/C to HEP  Plan: Continue per plan of care    Aggressive PROM        Precautions: S/P tenolysis and capsulotomy      Manual  12/9 12/12 12/16 12/17 12/27 12/30 1/3 1/10 12/5 12/6   PROM 10' 10' 10' 10' 10' 10' 10' 10' 10' 10'   STM IASTM 5' IASTM 5' IASTM5' 5' IASTM 5' 5' 5' 5' IASTM instrinsics 3' IASTM 5'   Wound Care             MEM             Orthotic Instruction             Intrinsic Stretching 2'        4' 2'       Exercise Diary  12/12 12/13 12/16 12/17 12/20 12/27 12/30 1/3 1/10 12/9   Progressive Grasp 3x15 smallest isotube 3x15 smallest isotube 3x15 small isotube  3x15 isotubes 3x15 small isotube    3x15 smallest isotube  '   Opposition             Wrist Maze             Active Extension     2x10        Fist with foam Red 3x15 Red 3x20 Red 3x15 Red 2x20 Red 2x20 Red 3x15 Red 3x15      Place and Hold     2x15        Active Ab 30x 30x with marbles 30x with marbles 30x with marbles 30x marbles   Yellow Putty 3x10 Yellow Putty 3x10    Translation Marbles 30x Marbles 50x Marbles 50x          Intrinsic strengthening  tweezer block stacking 4x3 blocks Tweezer block stacking 4x 3 blocks Tweezers block stacking 4x 3 blocks and 3x 4 blocks Tweezers box stacking        Gross Grasp   3x15 RPW 3x15 RPW power and cylindrical 3x10 RPW 3x10 RPW, x10 GPW 3x10 RPW and 3x10 GPW  3x10 RPW, 3x10 GPW 3x10 GPW    Digiflex    yellow 2x10         Pinch with SF     Yellow, marbles Yellow marbles  Red Marbles Red Marbles     Pinch Ring      RF and SF Y/R 2x RF and SF Y/R 2x      EDC      Jar 1x Jar 1x Jar 1x Jar 1x    Pegs        Red in, 25# out Red in 30# out    Wrist Strengthening         RFB 3x10, GFB x10                                           HEP: Digit ROM   Reviewed PROM and place and holds              Modalities  12/12 12/13 12/16 12/17 12/27 12/30 1/3 1/10 12/5 12/6   MHP             NMES             Fluido 10' 8'       8' 15'   Paraffin   15' in fist 10' in fist 10' in fist 15' in fist 10' in fist 10' in fist

## 2020-01-17 ENCOUNTER — OFFICE VISIT (OUTPATIENT)
Dept: OCCUPATIONAL THERAPY | Facility: MEDICAL CENTER | Age: 40
End: 2020-01-17
Payer: COMMERCIAL

## 2020-01-17 DIAGNOSIS — S62.336D CLOSED DISPLACED FRACTURE OF NECK OF FIFTH METACARPAL BONE OF RIGHT HAND WITH ROUTINE HEALING, SUBSEQUENT ENCOUNTER: Primary | ICD-10-CM

## 2020-01-17 DIAGNOSIS — Z98.890 STATUS POST MUSCULOSKELETAL SYSTEM SURGERY: ICD-10-CM

## 2020-01-17 PROCEDURE — 97530 THERAPEUTIC ACTIVITIES: CPT

## 2020-01-17 PROCEDURE — 97110 THERAPEUTIC EXERCISES: CPT

## 2020-01-17 PROCEDURE — 97018 PARAFFIN BATH THERAPY: CPT

## 2020-01-17 NOTE — PROGRESS NOTES
Daily Note     Today's date: 2020  Patient name: Asha Ni  : 1980  MRN: [id number]  Referring provider: Adi Nelson MD  Dx:   Encounter Diagnosis     ICD-10-CM    1  Closed displaced fracture of neck of fifth metacarpal bone of right hand with routine healing, subsequent encounter S62 336D    2  Status post musculoskeletal system surgery Z98 [id number]                   Subjective: It feels really great    Objective: See treatment diary below    Assessment: Tolerated treatment well   Patient would benefit from continued OT D/C to HEP    Plan: D/C to HEP        Precautions: S/P tenolysis and capsulotomy      Manual  12/9 12/12 12/16 12/17 12/27 12/30 1/3 1/10 12/5 12/6   PROM 10' 10' 10' 10' 10' 10' 10' 10' 10' 10'   STM IASTM 5' IASTM 5' IASTM5' 5' IASTM 5' 5' 5' 5' IASTM instrinsics 3' IASTM 5'   Wound Care             MEM             Orthotic Instruction             Intrinsic Stretching 2'        4' 2'       Exercise Diary  12/12 12/13 12/16 12/17 12/20 12/27 12/30 1/3 1/10 1/17   Progressive Grasp 3x15 smallest isotube 3x15 smallest isotube 3x15 small isotube  3x15 isotubes 3x15 small isotube       Opposition             Wrist Maze             Active Extension     2x10        Fist with foam Red 3x15 Red 3x20 Red 3x15 Red 2x20 Red 2x20 Red 3x15 Red 3x15      Place and Hold     2x15        Active Ab 30x 30x with marbles 30x with marbles 30x with marbles 30x marbles   Yellow Putty 3x10 Yellow Putty 3x10    Translation Marbles 30x Marbles 50x Marbles 50x          Intrinsic strengthening  tweezer block stacking 4x3 blocks Tweezer block stacking 4x 3 blocks Tweezers block stacking 4x 3 blocks and 3x 4 blocks Tweezers box stacking        Gross Grasp   3x15 RPW 3x15 RPW power and cylindrical 3x10 RPW 3x10 RPW, x10 GPW 3x10 RPW and 3x10 GPW  3x10 RPW, 3x10 GPW 3x10 GPW 3x10 Black power and cylindrical   Digiflex    yellow 2x10         Pinch with SF     Yellow, marbles Yellow marbles  Red Marbles Red Marbles     Pinch Ring      RF and SF Y/R 2x RF and SF Y/R 2x      EDC      Jar 1x Jar 1x Jar 1x Jar 1x Yellow 3x10   Pegs        Red in, 25# out Red in 30# out    Wrist Strengthening         RFB 3x10, GFB x10 GFB 3x10                                          HEP: Digit ROM   Reviewed PROM and place and holds       Theraputty HEP       Modalities  12/12 12/13 12/16 12/17 12/27 12/30 1/3 1/10 1/17 12/6   MHP             NMES             Fluido 10' 8'        15'   Paraffin   15' in fist 10' in fist 10' in fist 15' in fist 10' in fist 10' in fist 10' in fist

## 2020-03-10 ENCOUNTER — DOCTOR'S OFFICE (OUTPATIENT)
Dept: URBAN - METROPOLITAN AREA CLINIC 137 | Facility: CLINIC | Age: 40
Setting detail: OPHTHALMOLOGY
End: 2020-03-10

## 2020-03-10 ENCOUNTER — OPTICAL OFFICE (OUTPATIENT)
Dept: URBAN - METROPOLITAN AREA CLINIC 146 | Facility: CLINIC | Age: 40
Setting detail: OPHTHALMOLOGY
End: 2020-03-10

## 2020-03-10 DIAGNOSIS — H52.13: ICD-10-CM

## 2020-03-10 DIAGNOSIS — H52.223: ICD-10-CM

## 2020-03-10 PROBLEM — H16.202 KERATOCONJUNCTIVITIS; LEFT EYE: Status: RESOLVED | Noted: 2019-02-25 | Resolved: 2020-03-10

## 2020-03-10 PROCEDURE — S0500 DISPOS CONT LENS: HCPCS | Performed by: OPTOMETRIST

## 2020-03-10 PROCEDURE — 92310 CONTACT LENS FITTING OU: CPT | Performed by: OPTOMETRIST

## 2020-03-10 PROCEDURE — SELFPAYVIS VISION VISIT SELF PAY: Performed by: OPTOMETRIST

## 2020-03-10 ASSESSMENT — SPHEQUIV_DERIVED
OD_SPHEQUIV: -3
OD_SPHEQUIV: -3.25
OD_SPHEQUIV: -3.25
OS_SPHEQUIV: -3.125
OS_SPHEQUIV: -3.375

## 2020-03-10 ASSESSMENT — CONFRONTATIONAL VISUAL FIELD TEST (CVF)
OD_FINDINGS: FULL
OS_FINDINGS: FULL

## 2020-03-10 ASSESSMENT — REFRACTION_MANIFEST
OD_SPHERE: -2.50
OD_CYLINDER: -1.00
OS_VA1: 20/20
OD_VA1: 20/20
OD_AXIS: 180
OD_VA1: 20/20
OS_SPHERE: -3.00
OS_SPHERE: -3.00
OS_VA1: 20/20
OS_CYLINDER: -0.25
OD_SPHERE: -2.75
OS_CYLINDER: -0.75
OS_AXIS: 5
OD_AXIS: 5
OS_AXIS: 5
OD_CYLINDER: -1.00

## 2020-03-10 ASSESSMENT — REFRACTION_AUTOREFRACTION
OS_SPHERE: -3.25
OD_AXIS: 12
OD_SPHERE: -3.00
OD_CYLINDER: -0.50

## 2020-03-10 ASSESSMENT — CORNEAL EDEMA CLINICAL DESCRIPTION
OS_CORNEALEDEMA: 1+
OD_CORNEALEDEMA: T

## 2020-03-10 ASSESSMENT — VISUAL ACUITY
OS_BCVA: 20/20-1
OD_BCVA: 20/20

## 2020-03-10 ASSESSMENT — REFRACTION_CURRENTRX
OD_SPHERE: -4.00
OD_AXIS: 164
OD_OVR_VA: 20/
OS_SPHERE: -4.00
OD_CYLINDER: -0.25
OS_CYLINDER: SPH
OS_OVR_VA: 20/

## 2020-07-01 ENCOUNTER — ANNUAL EXAM (OUTPATIENT)
Dept: OBGYN CLINIC | Facility: CLINIC | Age: 40
End: 2020-07-01
Payer: COMMERCIAL

## 2020-07-01 VITALS
DIASTOLIC BLOOD PRESSURE: 72 MMHG | WEIGHT: 168 LBS | HEIGHT: 64 IN | BODY MASS INDEX: 28.68 KG/M2 | SYSTOLIC BLOOD PRESSURE: 132 MMHG

## 2020-07-01 DIAGNOSIS — Z01.419 ENCOUNTER FOR GYNECOLOGICAL EXAMINATION: ICD-10-CM

## 2020-07-01 DIAGNOSIS — R10.2 PELVIC PAIN: ICD-10-CM

## 2020-07-01 DIAGNOSIS — Z01.419 ENCOUNTER FOR GYNECOLOGICAL EXAMINATION WITHOUT ABNORMAL FINDING: Primary | ICD-10-CM

## 2020-07-01 PROCEDURE — S0612 ANNUAL GYNECOLOGICAL EXAMINA: HCPCS | Performed by: OBSTETRICS & GYNECOLOGY

## 2020-07-01 NOTE — ASSESSMENT & PLAN NOTE
Pap/HPV current  Pelvic pain - midline, post menses  Possibliy adenomyosis or IC  Not interested in hormones or surgery at this time  Diet is overall very healthy but may have acidic component - consider Prelief if this pain is from IC this may help

## 2020-07-01 NOTE — PROGRESS NOTES
Assessment/Plan:    Encounter for gynecological examination  Pap/HPV current  Pelvic pain - midline, post menses  Possibliy adenomyosis or IC  Not interested in hormones or surgery at this time  Diet is overall very healthy but may have acidic component - consider Prelief if this pain is from IC this may help  Diagnoses and all orders for this visit:    Encounter for gynecological examination without abnormal finding    Encounter for gynecological examination    Pelvic pain  Comments:  One week post menses pelvic pain x 3-4 days  Possiblities: IC, adenomyosis, bowel  reviewed  Not interested in surgery or hormones  Try prelief  Subjective:      Patient ID: Vlad Cody is a 46 y o  female  Patient presents today for yearly exam  Patient's only complaint is consistent pain post period similar to that of "cramping" lasting for 4-5 days; started about two years ago  LMP - 6/21/20  BC - none  Pap - 1/13/17; negative -HPV    Patient is not a smoker  Patient drinks 5 glasses of wine/week  Patient exercises 4x/week  Pelvic pain occurs right after menses  Lasting 4-5 days can be very severe  Menses are regular  Bowels are regular  No recent changes in diet - except for past few months less restrictive on calories but no difference in pain  Exercises regularly  Son is 7  Plays baseball and soccer  Gynecologic Exam   The patient's primary symptoms include pelvic pain  The patient's pertinent negatives include no genital itching, genital lesions, genital odor, genital rash, vaginal bleeding or vaginal discharge  The pain is moderate  Affected Side: midline  Pertinent negatives include no chills, constipation, diarrhea, fever, frequency, headaches, nausea, painful intercourse, sore throat, urgency or vomiting  She is sexually active  Her menstrual history has been regular         The following portions of the patient's history were reviewed and updated as appropriate:   She  has a past medical history of No known health problems  She   Patient Active Problem List    Diagnosis Date Noted    Closed displaced fracture of neck of right fifth metacarpal bone 10/21/2019    Contracture of hand joint, 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    Encounter for gynecological examination 05/08/2018     She  has a past surgical history that includes No past surgeries; pr open tx metacarpal fracture single ea bone (Right, 11/5/2019); and Hand surgery (Right)  Her family history includes Diabetes in her family; Heart disease in her family; No Known Problems in her father and mother  She  reports that she has never smoked  She has never used smokeless tobacco  She reports that she drinks about 5 0 standard drinks of alcohol per week  She reports that she does not use drugs  No current outpatient medications on file  No current facility-administered medications for this visit  No current outpatient medications on file prior to visit  No current facility-administered medications on file prior to visit  She has No Known Allergies       Review of Systems   Constitutional: Negative for activity change, appetite change, chills, fatigue and fever  HENT: Negative for rhinorrhea, sneezing and sore throat  Eyes: Negative for visual disturbance  Respiratory: Negative for cough, shortness of breath and wheezing  Cardiovascular: Negative for chest pain, palpitations and leg swelling  Gastrointestinal: Negative for abdominal distention, constipation, diarrhea, nausea and vomiting  Genitourinary: Positive for pelvic pain  Negative for difficulty urinating, frequency, urgency and vaginal discharge  Neurological: Negative for syncope, light-headedness and headaches           Objective:      /72 (BP Location: Right arm, Patient Position: Sitting, Cuff Size: Large)   Ht 5' 4" (1 626 m)   Wt 76 2 kg (168 lb)   LMP 06/21/2020 (Exact Date)   BMI 28 84 kg/m²          Physical Exam   Constitutional: She is oriented to person, place, and time  Pulmonary/Chest: Right breast exhibits no inverted nipple, no mass, no nipple discharge, no skin change and no tenderness  Left breast exhibits no inverted nipple, no mass, no nipple discharge, no skin change and no tenderness  No breast tenderness, discharge or bleeding  Breasts are symmetrical    Genitourinary: Vagina normal  No breast tenderness, discharge or bleeding  There is no rash, tenderness, lesion or injury on the right labia  There is no rash, tenderness, lesion or injury on the left labia  Uterus is tender  Uterus is not deviated, not enlarged and not fixed  Cervix exhibits no motion tenderness, no discharge and no friability  Right adnexum displays no mass, no tenderness and no fullness  Left adnexum displays no mass, no tenderness and no fullness  No tenderness or bleeding in the vagina  No vaginal discharge found  Neurological: She is alert and oriented to person, place, and time

## 2020-07-01 NOTE — PATIENT INSTRUCTIONS
Interstitial Cystitis   WHAT YOU NEED TO KNOW:   What is interstitial cystitis? Interstitial cystitis (IC) is also called painful bladder syndrome  IC is a condition that causes pain in your bladder and pelvic area  You may also have ulcers in your bladder  The cause of IC is unknown  What are other symptoms of IC?   · Pressure in your bladder and pelvic area    · Urgent need to urinate    · Urinating more often (urinary frequency) or waking from sleep to urinate     · Increased pain during menstruation    · Pain in the penis or scrotum in men    · Pain during sex  How is IC diagnosed? Your healthcare provider will examine you and ask questions about your symptoms  He will also ask about any medical conditions you have and medicines you take  Your healthcare provider may ask you to keep track of how much liquid you drink  You may also need to keep track of how often and how much you urinate  Your healthcare provider may order tests to rule out other causes of IC  Examples include urine tests or a cystoscopy  A cystoscopy is a procedure to look inside your urethra and bladder  How is IC treated? The goal of treatment is to control your symptoms  Your healthcare provider will work with you to find the treatment plan that works best to help control your symptoms  He may recommend any of the following:  · Nutrition changes  may be needed  Certain foods may worsen your symptoms  These foods include citrus fruits (oranges, sagar), tomatoes, chocolate, and coffee  Other foods and drinks that may worsen your symptoms include alcohol, spicy foods, and carbonated drinks (soft drinks)  · Medicines  may be given to decrease symptoms such as pain, urinary urgency, and frequency  These medicines may be taken by mouth or placed directly into your bladder  · Bladder distension  is a procedure to stretch the walls of your bladder using gas or fluid  · Electrical stimulation  may be done to control symptoms  Mild electrical pulses are sent to the nerves in your bladder  These impulses may help increase blood flow to your bladder or strengthen the muscles that control your bladder  It may also help release hormones that block pain  · Surgery  may be done to remove ulcers in your bladder  Surgery may also be done to make your bladder larger  Damaged areas of your bladder are removed and replaced with tissue from your large intestine  How can I manage my symptoms? · Do Kegel exercises as directed  Kegel exercises will help strengthen the muscles that control bowel movements and urination  Ask your healthcare provider for more information on Kegel exercises  Tighten your pelvic muscles slowly  It may feel like you are trying to hold back urine or gas  Hold these muscles and count to 3  Relax, tighten them quickly, and release  Repeat the cycle 10 times  Do 10 sets of Kegel exercises, 5 times a day  Do not hold your breath when you do Kegel exercises  Keep your stomach, back, and leg muscles relaxed  · Do not smoke  Smoking may worsen your symptoms  Nicotine and other chemicals in cigarettes and cigars can also cause lung damage  Ask your healthcare provider for information if you currently smoke and need help to quit  E-cigarettes or smokeless tobacco still contain nicotine  Talk to your healthcare provider before you use these products  · Train your bladder to urinate less often  This can be done by going to the bathroom at scheduled times  Try to hold your urine when you feel the urge to go  For example, hold your urine for 5 minutes when you feel the urge to go  As that becomes easier, hold your urine for 10 minutes  · Manage stress  Stress may worsen symptoms  Your healthcare provider may also recommend that you find ways to manage stress, such as relaxation techniques  When should I contact my healthcare provider? · Your symptoms get worse, or you develop new symptoms       · You have questions or concerns about your condition or care  CARE AGREEMENT:   You have the right to help plan your care  Learn about your health condition and how it may be treated  Discuss treatment options with your caregivers to decide what care you want to receive  You always have the right to refuse treatment  The above information is an  only  It is not intended as medical advice for individual conditions or treatments  Talk to your doctor, nurse or pharmacist before following any medical regimen to see if it is safe and effective for you  © 2017 [employer] Information is for End User's use only and may not be sold, redistributed or otherwise used for commercial purposes  All illustrations and images included in CareNotes® are the copyrighted property of [employer]  or [employer]

## 2020-12-01 ENCOUNTER — OPTICAL OFFICE (OUTPATIENT)
Dept: URBAN - METROPOLITAN AREA CLINIC 146 | Facility: CLINIC | Age: 40
Setting detail: OPHTHALMOLOGY
End: 2020-12-01

## 2020-12-01 DIAGNOSIS — H52.13: ICD-10-CM

## 2020-12-01 PROCEDURE — S0500 DISPOS CONT LENS: HCPCS | Performed by: OPTOMETRIST

## 2021-04-13 DIAGNOSIS — Z23 ENCOUNTER FOR IMMUNIZATION: ICD-10-CM

## 2021-04-27 ENCOUNTER — IMMUNIZATIONS (OUTPATIENT)
Dept: FAMILY MEDICINE CLINIC | Facility: HOSPITAL | Age: 41
End: 2021-04-27

## 2021-04-27 DIAGNOSIS — Z23 ENCOUNTER FOR IMMUNIZATION: Primary | ICD-10-CM

## 2021-04-27 PROCEDURE — 91300 SARS-COV-2 / COVID-19 MRNA VACCINE (PFIZER-BIONTECH) 30 MCG: CPT

## 2021-04-27 PROCEDURE — 0001A SARS-COV-2 / COVID-19 MRNA VACCINE (PFIZER-BIONTECH) 30 MCG: CPT

## 2021-05-18 ENCOUNTER — IMMUNIZATIONS (OUTPATIENT)
Dept: FAMILY MEDICINE CLINIC | Facility: HOSPITAL | Age: 41
End: 2021-05-18

## 2021-05-18 DIAGNOSIS — Z23 ENCOUNTER FOR IMMUNIZATION: Primary | ICD-10-CM

## 2021-05-18 PROCEDURE — 91300 SARS-COV-2 / COVID-19 MRNA VACCINE (PFIZER-BIONTECH) 30 MCG: CPT

## 2021-05-18 PROCEDURE — 0002A SARS-COV-2 / COVID-19 MRNA VACCINE (PFIZER-BIONTECH) 30 MCG: CPT

## 2021-07-13 ENCOUNTER — ANNUAL EXAM (OUTPATIENT)
Dept: OBGYN CLINIC | Facility: CLINIC | Age: 41
End: 2021-07-13
Payer: COMMERCIAL

## 2021-07-13 VITALS — DIASTOLIC BLOOD PRESSURE: 80 MMHG | SYSTOLIC BLOOD PRESSURE: 132 MMHG | WEIGHT: 177.4 LBS | BODY MASS INDEX: 30.45 KG/M2

## 2021-07-13 DIAGNOSIS — Z01.419 ENCOUNTER FOR GYNECOLOGICAL EXAMINATION: Primary | ICD-10-CM

## 2021-07-13 DIAGNOSIS — Z30.42 ENCOUNTER FOR DEPO-PROVERA CONTRACEPTION: ICD-10-CM

## 2021-07-13 DIAGNOSIS — N80.9 ENDOMETRIOSIS: ICD-10-CM

## 2021-07-13 DIAGNOSIS — Z12.31 SCREENING MAMMOGRAM, ENCOUNTER FOR: ICD-10-CM

## 2021-07-13 DIAGNOSIS — N94.6 DYSMENORRHEA: ICD-10-CM

## 2021-07-13 PROCEDURE — S0612 ANNUAL GYNECOLOGICAL EXAMINA: HCPCS | Performed by: OBSTETRICS & GYNECOLOGY

## 2021-07-13 RX ORDER — MEDROXYPROGESTERONE ACETATE 150 MG/ML
150 INJECTION, SUSPENSION INTRAMUSCULAR
Qty: 1 ML | Refills: 3 | Status: ON HOLD | OUTPATIENT
Start: 2021-07-13 | End: 2021-11-16 | Stop reason: ALTCHOICE

## 2021-07-13 NOTE — ASSESSMENT & PLAN NOTE
Pap/HPV current    Pelvic pain persists from last year discussed medical versus surgical treatment - symptoms consistent with endometriosis  Will start with trial of depoprovera  Safe and effective use of medication discussed       Follow up 3 months

## 2021-07-13 NOTE — PROGRESS NOTES
Assessment/Plan:    Encounter for gynecological examination  Pap/HPV current    Pelvic pain persists from last year discussed medical versus surgical treatment - symptoms consistent with endometriosis  Will start with trial of depoprovera  Safe and effective use of medication discussed  Follow up 3 months       Diagnoses and all orders for this visit:    Encounter for gynecological examination    Screening mammogram, encounter for  -     Mammo screening bilateral w 3d & cad; Future    Encounter for Depo-Provera contraception  -     medroxyPROGESTERone (DEPO-PROVERA) 150 mg/mL injection; Inject 1 mL (150 mg total) into a muscle every 3 (three) months    Endometriosis    Dysmenorrhea  -     medroxyPROGESTERone (DEPO-PROVERA) 150 mg/mL injection; Inject 1 mL (150 mg total) into a muscle every 3 (three) months          Subjective:      Patient ID: Richy Landaverde is a 45 y o  female  Patient presents for a routine annual visit  Menarche- 16 Y/O  Last Pap Smear- 17 Neg-HPV  LMP-  Birth control-none    Non smoker  Social drinker  Currently sexually active  No family history of uterine, ovarian, cervical or breast cancer  No concerns/questions for today's visit    Pelvic pain - for several years, worse over the last year  Diffuse, midline pelvic  Only week no pain/bloating is week of menses the resumes again  Bloating, pelvic pain/pressure  Velez  + dyspareunia   Gynecologic Exam  The patient's primary symptoms include pelvic pain  This is a chronic problem  The current episode started more than 1 year ago  The problem occurs daily  The problem has been gradually worsening  The pain is moderate  Associated symptoms include painful intercourse  Pertinent negatives include no chills, constipation, diarrhea, fever, frequency, nausea, sore throat, urgency or vomiting  The symptoms are aggravated by activity and tactile pressure  She has tried acetaminophen, heating pads and NSAIDs for the symptoms   She is sexually active  Her menstrual history has been regular  The following portions of the patient's history were reviewed and updated as appropriate:   She  has a past medical history of No known health problems  She   Patient Active Problem List    Diagnosis Date Noted    Closed displaced fracture of neck of right fifth metacarpal bone 10/21/2019    Contracture of hand joint, 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    Encounter for gynecological examination 05/08/2018     She  has a past surgical history that includes No past surgeries; pr open tx metacarpal fracture single ea bone (Right, 11/5/2019); and Hand surgery (Right)  Her family history includes Diabetes in her family; Heart disease in her family; No Known Problems in her father and mother  She  reports that she has never smoked  She has never used smokeless tobacco  She reports current alcohol use of about 5 0 standard drinks of alcohol per week  She reports that she does not use drugs  Current Outpatient Medications   Medication Sig Dispense Refill    medroxyPROGESTERone (DEPO-PROVERA) 150 mg/mL injection Inject 1 mL (150 mg total) into a muscle every 3 (three) months 1 mL 3     No current facility-administered medications for this visit  No current outpatient medications on file prior to visit  No current facility-administered medications on file prior to visit  She has No Known Allergies       Review of Systems   Constitutional: Negative for activity change, appetite change, chills, fatigue and fever  HENT: Negative for rhinorrhea, sneezing and sore throat  Eyes: Negative for visual disturbance  Respiratory: Negative for cough, shortness of breath and wheezing  Cardiovascular: Negative for chest pain, palpitations and leg swelling  Gastrointestinal: Negative for abdominal distention, constipation, diarrhea, nausea and vomiting  Genitourinary: Positive for pelvic pain  Negative for difficulty urinating, frequency and urgency  Neurological: Negative for syncope and light-headedness  Objective:      /80   Wt 80 5 kg (177 lb 6 4 oz)   LMP 07/02/2021   BMI 30 45 kg/m²          Physical Exam  Chest:      Breasts: Breasts are symmetrical          Right: No inverted nipple, mass, nipple discharge, skin change or tenderness  Left: No inverted nipple, mass, nipple discharge, skin change or tenderness  Genitourinary:     Labia:         Right: No rash, tenderness, lesion or injury  Left: No rash, tenderness, lesion or injury  Vagina: Normal  No vaginal discharge, tenderness or bleeding  Cervix: No cervical motion tenderness, discharge or friability  Uterus: Tender  Not deviated, not enlarged and not fixed  Adnexa:         Right: No mass, tenderness or fullness  Left: No mass, tenderness or fullness  Neurological:      Mental Status: She is alert and oriented to person, place, and time

## 2021-07-15 ENCOUNTER — CLINICAL SUPPORT (OUTPATIENT)
Dept: OBGYN CLINIC | Facility: MEDICAL CENTER | Age: 41
End: 2021-07-15
Payer: COMMERCIAL

## 2021-07-15 VITALS
HEIGHT: 64 IN | DIASTOLIC BLOOD PRESSURE: 74 MMHG | WEIGHT: 174 LBS | BODY MASS INDEX: 29.71 KG/M2 | SYSTOLIC BLOOD PRESSURE: 110 MMHG

## 2021-07-15 DIAGNOSIS — Z30.42 ENCOUNTER FOR DEPO-PROVERA CONTRACEPTION: Primary | ICD-10-CM

## 2021-07-15 LAB — SL AMB POCT URINE HCG: NORMAL

## 2021-07-15 PROCEDURE — 96372 THER/PROPH/DIAG INJ SC/IM: CPT | Performed by: OBSTETRICS & GYNECOLOGY

## 2021-07-15 RX ORDER — MEDROXYPROGESTERONE ACETATE 150 MG/ML
150 INJECTION, SUSPENSION INTRAMUSCULAR
Status: DISCONTINUED | OUTPATIENT
Start: 2021-07-15 | End: 2021-11-16 | Stop reason: ALTCHOICE

## 2021-07-15 RX ADMIN — MEDROXYPROGESTERONE ACETATE 150 MG: 150 INJECTION, SUSPENSION INTRAMUSCULAR at 07:56

## 2021-07-15 NOTE — PROGRESS NOTES
Pt is here for Depo Provera injection  Her last dose was 07/15/2021 first Depo injection   Her annual exam was on 07/13/2021  Urine pregnancy test done: YES  Results were negative   Depo given in right Deltoid  Tolerated well   YES  Lot [id number] Exp 03/31/2023  Next dose due 09/30/2021-10/14/2021  IFN#[id number]

## 2021-08-24 ENCOUNTER — TELEPHONE (OUTPATIENT)
Dept: OBGYN CLINIC | Facility: CLINIC | Age: 41
End: 2021-08-24

## 2021-08-24 NOTE — TELEPHONE ENCOUNTER
Pt called because she is cramping badly after the Depo was given  She suffers from endometriosis  She had her menstrual period for 2 weeks straight with cramps  Wanted to know if that was normal because that's why she got the shot to control the pain  Would like to speak with someone  Please call pt   Ty

## 2021-08-24 NOTE — TELEPHONE ENCOUNTER
Spoke with pt re bleeding and cramping  After first depo   States if symptoms conbtinue while taking 600mg motrin, she will not continue

## 2021-09-29 ENCOUNTER — TELEPHONE (OUTPATIENT)
Dept: OBGYN CLINIC | Facility: CLINIC | Age: 41
End: 2021-09-29

## 2021-09-29 NOTE — TELEPHONE ENCOUNTER
Pt tried one round of depo provera to help with pain after her menstrual cycle  She has had bleeding and spotting and it has not helped the pain   Dr Yesenia Mulligan said there was something else she could try to "get to the bottom of it  "

## 2021-09-29 NOTE — TELEPHONE ENCOUNTER
Next step would be diagnostic laparoscopy if she is interested in surgery    We can schedule an appointment to discuss

## 2021-09-29 NOTE — TELEPHONE ENCOUNTER
Pt at end of 3 mos - Bled every day but 5  No relief of pain at all  Even worse  Osmar Gloss you have other ideas for her  She said adolfo was terrible

## 2021-09-29 NOTE — TELEPHONE ENCOUNTER
Another option is Orlissa  This is a medication that specifically treats endometriosis pain  It is 150mg pill to be taken daily  Most patients tolerate this well, some have hot flashes and initially irregular bleeding but this resolves  Headache and nausea may occur with initial doses but this resolves  If she would like to try this, we can send in a prescription and plan on a 2 month follow up visit

## 2021-09-29 NOTE — TELEPHONE ENCOUNTER
Pt contacted and informed as directed  Pt declined  Pt stated she wanted to see if there was something inside that can be causing this other than and ultrasound  Pt stated she doesn't want to continue with pills she wants to get to the bottom of her discomfort and know what is causing all this   Please advise

## 2021-09-29 NOTE — TELEPHONE ENCOUNTER
Pt contacted and informed as directed  Pt was grateful for the information and call  Pt was offered opening of cancellation for today 3, pt declined due to has to  son  Pt informed I will send hr information to Ms Grace Miller and she will reach out with an avaiable time slot for a surgery consult with donte  Pt agreed to await the call

## 2021-10-05 ENCOUNTER — OFFICE VISIT (OUTPATIENT)
Dept: OBGYN CLINIC | Facility: CLINIC | Age: 41
End: 2021-10-05
Payer: COMMERCIAL

## 2021-10-05 VITALS
DIASTOLIC BLOOD PRESSURE: 74 MMHG | HEIGHT: 64 IN | BODY MASS INDEX: 30.05 KG/M2 | WEIGHT: 176 LBS | SYSTOLIC BLOOD PRESSURE: 108 MMHG

## 2021-10-05 DIAGNOSIS — R10.2 PELVIC PAIN: Primary | ICD-10-CM

## 2021-10-05 PROCEDURE — 99214 OFFICE O/P EST MOD 30 MIN: CPT | Performed by: OBSTETRICS & GYNECOLOGY

## 2021-10-27 ENCOUNTER — HOSPITAL ENCOUNTER (OUTPATIENT)
Dept: RADIOLOGY | Facility: MEDICAL CENTER | Age: 41
Discharge: HOME | End: 2021-10-27
Payer: COMMERCIAL

## 2021-10-27 DIAGNOSIS — R10.2 PELVIC PAIN: ICD-10-CM

## 2021-10-27 PROCEDURE — 76856 US EXAM PELVIC COMPLETE: CPT

## 2021-10-27 PROCEDURE — 76830 TRANSVAGINAL US NON-OB: CPT

## 2021-11-05 ENCOUNTER — APPOINTMENT (OUTPATIENT)
Dept: LAB | Facility: MEDICAL CENTER | Age: 41
End: 2021-11-05
Payer: COMMERCIAL

## 2021-11-05 DIAGNOSIS — Z01.818 PRE-OP TESTING: ICD-10-CM

## 2021-11-05 DIAGNOSIS — Z01.818 PREOP EXAMINATION: ICD-10-CM

## 2021-11-05 LAB
ANION GAP SERPL CALCULATED.3IONS-SCNC: 4 MMOL/L (ref 4–13)
BUN SERPL-MCNC: 13 MG/DL (ref 5–25)
CALCIUM SERPL-MCNC: 9.1 MG/DL (ref 8.3–10.1)
CHLORIDE SERPL-SCNC: 105 MMOL/L (ref 100–108)
CO2 SERPL-SCNC: 28 MMOL/L (ref 21–32)
CREAT SERPL-MCNC: 0.97 MG/DL (ref 0.6–1.3)
ERYTHROCYTE [DISTWIDTH] IN BLOOD BY AUTOMATED COUNT: 12.6 % (ref 11.6–15.1)
GFR SERPL CREATININE-BSD FRML MDRD: 73 ML/MIN/1.73SQ M
GLUCOSE P FAST SERPL-MCNC: 95 MG/DL (ref 65–99)
HCT VFR BLD AUTO: 42.3 % (ref 34.8–46.1)
HGB BLD-MCNC: 14 G/DL (ref 11.5–15.4)
MCH RBC QN AUTO: 32.1 PG (ref 26.8–34.3)
MCHC RBC AUTO-ENTMCNC: 33.1 G/DL (ref 31.4–37.4)
MCV RBC AUTO: 97 FL (ref 82–98)
PLATELET # BLD AUTO: 307 THOUSANDS/UL (ref 149–390)
PMV BLD AUTO: 10.6 FL (ref 8.9–12.7)
POTASSIUM SERPL-SCNC: 4.5 MMOL/L (ref 3.5–5.3)
RBC # BLD AUTO: 4.36 MILLION/UL (ref 3.81–5.12)
SODIUM SERPL-SCNC: 137 MMOL/L (ref 136–145)
WBC # BLD AUTO: 5.82 THOUSAND/UL (ref 4.31–10.16)

## 2021-11-05 PROCEDURE — 86850 RBC ANTIBODY SCREEN: CPT

## 2021-11-05 PROCEDURE — 86900 BLOOD TYPING SEROLOGIC ABO: CPT

## 2021-11-05 PROCEDURE — 85027 COMPLETE CBC AUTOMATED: CPT

## 2021-11-05 PROCEDURE — 36415 COLL VENOUS BLD VENIPUNCTURE: CPT

## 2021-11-05 PROCEDURE — 80048 BASIC METABOLIC PNL TOTAL CA: CPT

## 2021-11-05 PROCEDURE — 86901 BLOOD TYPING SEROLOGIC RH(D): CPT

## 2021-11-06 LAB
ABO GROUP BLD: NORMAL
BLD GP AB SCN SERPL QL: NEGATIVE
RH BLD: POSITIVE
SPECIMEN EXPIRATION DATE: NORMAL

## 2021-11-11 PROCEDURE — NC001 PR NO CHARGE: Performed by: OBSTETRICS & GYNECOLOGY

## 2021-11-11 RX ORDER — MULTIVITAMIN
1 TABLET ORAL DAILY
COMMUNITY

## 2021-11-15 ENCOUNTER — ANESTHESIA EVENT (OUTPATIENT)
Dept: PERIOP | Facility: HOSPITAL | Age: 41
End: 2021-11-15
Payer: COMMERCIAL

## 2021-11-16 ENCOUNTER — HOSPITAL ENCOUNTER (OUTPATIENT)
Facility: HOSPITAL | Age: 41
Setting detail: OUTPATIENT SURGERY
Discharge: HOME | End: 2021-11-16
Attending: OBSTETRICS & GYNECOLOGY | Admitting: OBSTETRICS & GYNECOLOGY
Payer: COMMERCIAL

## 2021-11-16 ENCOUNTER — ANESTHESIA (OUTPATIENT)
Dept: PERIOP | Facility: HOSPITAL | Age: 41
End: 2021-11-16
Payer: COMMERCIAL

## 2021-11-16 VITALS
WEIGHT: 178.35 LBS | RESPIRATION RATE: 17 BRPM | OXYGEN SATURATION: 97 % | HEIGHT: 64 IN | BODY MASS INDEX: 30.45 KG/M2 | SYSTOLIC BLOOD PRESSURE: 98 MMHG | HEART RATE: 71 BPM | DIASTOLIC BLOOD PRESSURE: 76 MMHG | TEMPERATURE: 97.8 F

## 2021-11-16 DIAGNOSIS — R10.2 PELVIC PAIN IN FEMALE: ICD-10-CM

## 2021-11-16 DIAGNOSIS — G89.18 POSTOPERATIVE PAIN: Primary | ICD-10-CM

## 2021-11-16 PROCEDURE — 58662 LAPAROSCOPY EXCISE LESIONS: CPT | Performed by: OBSTETRICS & GYNECOLOGY

## 2021-11-16 PROCEDURE — 88305 TISSUE EXAM BY PATHOLOGIST: CPT | Performed by: PATHOLOGY

## 2021-11-16 PROCEDURE — 49322 LAPAROSCOPY ASPIRATION: CPT | Performed by: OBSTETRICS & GYNECOLOGY

## 2021-11-16 RX ORDER — HYDROMORPHONE HCL/PF 1 MG/ML
0.2 SYRINGE (ML) INJECTION
Status: DISCONTINUED | OUTPATIENT
Start: 2021-11-16 | End: 2021-11-16 | Stop reason: HOSPADM

## 2021-11-16 RX ORDER — ACETAMINOPHEN 325 MG/1
975 TABLET ORAL ONCE
Status: COMPLETED | OUTPATIENT
Start: 2021-11-16 | End: 2021-11-16

## 2021-11-16 RX ORDER — ROCURONIUM BROMIDE 10 MG/ML
INJECTION, SOLUTION INTRAVENOUS AS NEEDED
Status: DISCONTINUED | OUTPATIENT
Start: 2021-11-16 | End: 2021-11-16

## 2021-11-16 RX ORDER — ONDANSETRON 2 MG/ML
INJECTION INTRAMUSCULAR; INTRAVENOUS AS NEEDED
Status: DISCONTINUED | OUTPATIENT
Start: 2021-11-16 | End: 2021-11-16

## 2021-11-16 RX ORDER — MIDAZOLAM HYDROCHLORIDE 2 MG/2ML
INJECTION, SOLUTION INTRAMUSCULAR; INTRAVENOUS AS NEEDED
Status: DISCONTINUED | OUTPATIENT
Start: 2021-11-16 | End: 2021-11-16

## 2021-11-16 RX ORDER — DEXAMETHASONE SODIUM PHOSPHATE 10 MG/ML
INJECTION, SOLUTION INTRAMUSCULAR; INTRAVENOUS AS NEEDED
Status: DISCONTINUED | OUTPATIENT
Start: 2021-11-16 | End: 2021-11-16

## 2021-11-16 RX ORDER — SODIUM CHLORIDE 9 MG/ML
125 INJECTION, SOLUTION INTRAVENOUS CONTINUOUS
Status: DISCONTINUED | OUTPATIENT
Start: 2021-11-16 | End: 2021-11-16 | Stop reason: HOSPADM

## 2021-11-16 RX ORDER — SODIUM CHLORIDE, SODIUM LACTATE, POTASSIUM CHLORIDE, CALCIUM CHLORIDE 600; 310; 30; 20 MG/100ML; MG/100ML; MG/100ML; MG/100ML
125 INJECTION, SOLUTION INTRAVENOUS CONTINUOUS
Status: DISCONTINUED | OUTPATIENT
Start: 2021-11-16 | End: 2021-11-16 | Stop reason: HOSPADM

## 2021-11-16 RX ORDER — LIDOCAINE HYDROCHLORIDE AND EPINEPHRINE 10; 10 MG/ML; UG/ML
INJECTION, SOLUTION INFILTRATION; PERINEURAL AS NEEDED
Status: DISCONTINUED | OUTPATIENT
Start: 2021-11-16 | End: 2021-11-16 | Stop reason: HOSPADM

## 2021-11-16 RX ORDER — OXYCODONE HYDROCHLORIDE 5 MG/1
5 TABLET ORAL EVERY 4 HOURS PRN
Status: CANCELLED | OUTPATIENT
Start: 2021-11-16

## 2021-11-16 RX ORDER — LIDOCAINE HYDROCHLORIDE 10 MG/ML
INJECTION, SOLUTION EPIDURAL; INFILTRATION; INTRACAUDAL; PERINEURAL AS NEEDED
Status: DISCONTINUED | OUTPATIENT
Start: 2021-11-16 | End: 2021-11-16

## 2021-11-16 RX ORDER — IBUPROFEN 600 MG/1
600 TABLET ORAL EVERY 6 HOURS PRN
Qty: 30 TABLET | Refills: 0 | Status: SHIPPED | OUTPATIENT
Start: 2021-11-16

## 2021-11-16 RX ORDER — METOCLOPRAMIDE HYDROCHLORIDE 5 MG/ML
10 INJECTION INTRAMUSCULAR; INTRAVENOUS ONCE AS NEEDED
Status: DISCONTINUED | OUTPATIENT
Start: 2021-11-16 | End: 2021-11-16 | Stop reason: HOSPADM

## 2021-11-16 RX ORDER — FENTANYL CITRATE 50 UG/ML
INJECTION, SOLUTION INTRAMUSCULAR; INTRAVENOUS AS NEEDED
Status: DISCONTINUED | OUTPATIENT
Start: 2021-11-16 | End: 2021-11-16

## 2021-11-16 RX ORDER — HYDROMORPHONE HCL/PF 1 MG/ML
0.5 SYRINGE (ML) INJECTION
Status: CANCELLED | OUTPATIENT
Start: 2021-11-16

## 2021-11-16 RX ORDER — KETOROLAC TROMETHAMINE 30 MG/ML
INJECTION, SOLUTION INTRAMUSCULAR; INTRAVENOUS AS NEEDED
Status: DISCONTINUED | OUTPATIENT
Start: 2021-11-16 | End: 2021-11-16

## 2021-11-16 RX ORDER — SCOLOPAMINE TRANSDERMAL SYSTEM 1 MG/1
1 PATCH, EXTENDED RELEASE TRANSDERMAL ONCE
Status: DISCONTINUED | OUTPATIENT
Start: 2021-11-16 | End: 2021-11-16 | Stop reason: HOSPADM

## 2021-11-16 RX ORDER — ONDANSETRON 2 MG/ML
4 INJECTION INTRAMUSCULAR; INTRAVENOUS ONCE AS NEEDED
Status: DISCONTINUED | OUTPATIENT
Start: 2021-11-16 | End: 2021-11-16 | Stop reason: HOSPADM

## 2021-11-16 RX ORDER — ONDANSETRON 2 MG/ML
4 INJECTION INTRAMUSCULAR; INTRAVENOUS EVERY 6 HOURS PRN
Status: CANCELLED | OUTPATIENT
Start: 2021-11-16

## 2021-11-16 RX ORDER — LIDOCAINE HYDROCHLORIDE 10 MG/ML
0.5 INJECTION, SOLUTION EPIDURAL; INFILTRATION; INTRACAUDAL; PERINEURAL ONCE AS NEEDED
Status: DISCONTINUED | OUTPATIENT
Start: 2021-11-16 | End: 2021-11-16 | Stop reason: HOSPADM

## 2021-11-16 RX ORDER — SODIUM CHLORIDE, SODIUM LACTATE, POTASSIUM CHLORIDE, CALCIUM CHLORIDE 600; 310; 30; 20 MG/100ML; MG/100ML; MG/100ML; MG/100ML
20 INJECTION, SOLUTION INTRAVENOUS CONTINUOUS
Status: CANCELLED | OUTPATIENT
Start: 2021-11-16

## 2021-11-16 RX ORDER — PROPOFOL 10 MG/ML
INJECTION, EMULSION INTRAVENOUS AS NEEDED
Status: DISCONTINUED | OUTPATIENT
Start: 2021-11-16 | End: 2021-11-16

## 2021-11-16 RX ORDER — MAGNESIUM HYDROXIDE 1200 MG/15ML
LIQUID ORAL AS NEEDED
Status: DISCONTINUED | OUTPATIENT
Start: 2021-11-16 | End: 2021-11-16 | Stop reason: HOSPADM

## 2021-11-16 RX ORDER — OXYCODONE HYDROCHLORIDE 5 MG/1
5 TABLET ORAL EVERY 4 HOURS PRN
Qty: 5 TABLET | Refills: 0 | Status: SHIPPED | OUTPATIENT
Start: 2021-11-16 | End: 2021-11-26

## 2021-11-16 RX ORDER — CEFAZOLIN SODIUM 2 G/50ML
2000 SOLUTION INTRAVENOUS ONCE
Status: COMPLETED | OUTPATIENT
Start: 2021-11-16 | End: 2021-11-16

## 2021-11-16 RX ORDER — ACETAMINOPHEN 325 MG/1
650 TABLET ORAL EVERY 6 HOURS PRN
Status: CANCELLED | OUTPATIENT
Start: 2021-11-16

## 2021-11-16 RX ORDER — FENTANYL CITRATE/PF 50 MCG/ML
25 SYRINGE (ML) INJECTION
Status: DISCONTINUED | OUTPATIENT
Start: 2021-11-16 | End: 2021-11-16 | Stop reason: HOSPADM

## 2021-11-16 RX ADMIN — HYDROMORPHONE HYDROCHLORIDE 0.2 MG: 1 INJECTION, SOLUTION INTRAMUSCULAR; INTRAVENOUS; SUBCUTANEOUS at 12:45

## 2021-11-16 RX ADMIN — SCOPALAMINE 1 PATCH: 1 PATCH, EXTENDED RELEASE TRANSDERMAL at 10:37

## 2021-11-16 RX ADMIN — ACETAMINOPHEN 975 MG: 325 TABLET, FILM COATED ORAL at 10:23

## 2021-11-16 RX ADMIN — ROCURONIUM BROMIDE 40 MG: 10 INJECTION, SOLUTION INTRAVENOUS at 11:20

## 2021-11-16 RX ADMIN — MIDAZOLAM HYDROCHLORIDE 2 MG: 1 INJECTION, SOLUTION INTRAMUSCULAR; INTRAVENOUS at 11:15

## 2021-11-16 RX ADMIN — HYDROMORPHONE HYDROCHLORIDE 0.2 MG: 1 INJECTION, SOLUTION INTRAMUSCULAR; INTRAVENOUS; SUBCUTANEOUS at 12:27

## 2021-11-16 RX ADMIN — FENTANYL CITRATE 25 MCG: 50 INJECTION, SOLUTION INTRAMUSCULAR; INTRAVENOUS at 12:20

## 2021-11-16 RX ADMIN — FENTANYL CITRATE 50 MCG: 50 INJECTION, SOLUTION INTRAMUSCULAR; INTRAVENOUS at 11:33

## 2021-11-16 RX ADMIN — SODIUM CHLORIDE, SODIUM LACTATE, POTASSIUM CHLORIDE, AND CALCIUM CHLORIDE: .6; .31; .03; .02 INJECTION, SOLUTION INTRAVENOUS at 10:15

## 2021-11-16 RX ADMIN — HYDROMORPHONE HYDROCHLORIDE 0.2 MG: 1 INJECTION, SOLUTION INTRAMUSCULAR; INTRAVENOUS; SUBCUTANEOUS at 12:31

## 2021-11-16 RX ADMIN — FENTANYL CITRATE 25 MCG: 50 INJECTION, SOLUTION INTRAMUSCULAR; INTRAVENOUS at 12:15

## 2021-11-16 RX ADMIN — PROPOFOL 200 MG: 10 INJECTION, EMULSION INTRAVENOUS at 11:20

## 2021-11-16 RX ADMIN — FENTANYL CITRATE 50 MCG: 50 INJECTION, SOLUTION INTRAMUSCULAR; INTRAVENOUS at 11:20

## 2021-11-16 RX ADMIN — DEXAMETHASONE SODIUM PHOSPHATE 8 MG: 10 INJECTION, SOLUTION INTRAMUSCULAR; INTRAVENOUS at 11:25

## 2021-11-16 RX ADMIN — KETOROLAC TROMETHAMINE 30 MG: 30 INJECTION, SOLUTION INTRAMUSCULAR at 11:56

## 2021-11-16 RX ADMIN — LIDOCAINE HYDROCHLORIDE 50 MG: 10 INJECTION, SOLUTION EPIDURAL; INFILTRATION; INTRACAUDAL; PERINEURAL at 11:20

## 2021-11-16 RX ADMIN — ONDANSETRON 4 MG: 2 INJECTION INTRAMUSCULAR; INTRAVENOUS at 11:56

## 2021-11-16 RX ADMIN — SUGAMMADEX 200 MG: 100 INJECTION, SOLUTION INTRAVENOUS at 11:57

## 2021-11-16 RX ADMIN — CEFAZOLIN SODIUM 2000 MG: 2 SOLUTION INTRAVENOUS at 11:13

## 2021-11-16 RX ADMIN — HYDROMORPHONE HYDROCHLORIDE 0.2 MG: 1 INJECTION, SOLUTION INTRAMUSCULAR; INTRAVENOUS; SUBCUTANEOUS at 12:50

## 2021-11-17 ENCOUNTER — TELEPHONE (OUTPATIENT)
Dept: OBGYN CLINIC | Facility: CLINIC | Age: 41
End: 2021-11-17

## 2021-12-01 ENCOUNTER — OFFICE VISIT (OUTPATIENT)
Dept: OBGYN CLINIC | Facility: CLINIC | Age: 41
End: 2021-12-01

## 2021-12-01 VITALS
SYSTOLIC BLOOD PRESSURE: 128 MMHG | HEIGHT: 64 IN | WEIGHT: 178.4 LBS | DIASTOLIC BLOOD PRESSURE: 80 MMHG | BODY MASS INDEX: 30.46 KG/M2

## 2021-12-01 DIAGNOSIS — Z09 POSTOPERATIVE EXAMINATION: Primary | ICD-10-CM

## 2021-12-01 DIAGNOSIS — R10.2 PELVIC PAIN: ICD-10-CM

## 2021-12-01 PROCEDURE — 99024 POSTOP FOLLOW-UP VISIT: CPT | Performed by: OBSTETRICS & GYNECOLOGY

## 2022-02-22 ENCOUNTER — HOSPITAL ENCOUNTER (OUTPATIENT)
Dept: RADIOLOGY | Facility: MEDICAL CENTER | Age: 42
Discharge: HOME | End: 2022-02-22
Payer: COMMERCIAL

## 2022-02-22 VITALS — HEIGHT: 64 IN | WEIGHT: 178 LBS | BODY MASS INDEX: 30.39 KG/M2

## 2022-02-22 DIAGNOSIS — Z12.31 SCREENING MAMMOGRAM, ENCOUNTER FOR: ICD-10-CM

## 2022-02-22 PROCEDURE — 77063 BREAST TOMOSYNTHESIS BI: CPT

## 2022-02-22 PROCEDURE — 77067 SCR MAMMO BI INCL CAD: CPT

## 2022-04-25 ENCOUNTER — PATIENT MESSAGE (OUTPATIENT)
Dept: OBGYN CLINIC | Facility: CLINIC | Age: 42
End: 2022-04-25

## 2022-04-26 ENCOUNTER — TELEPHONE (OUTPATIENT)
Dept: OBGYN CLINIC | Facility: CLINIC | Age: 42
End: 2022-04-26

## 2022-04-26 NOTE — TELEPHONE ENCOUNTER
----- Message from Chi Alvares sent at 4/25/2022  7:55 PM EDT -----  Regarding: Radha Andrade! I wanted to talk to you about post surgery  I didnt have a full monthly cycle till January and just went through my 4th cycle  December I didnt have a cycle and was just sore  January & February I didnt have any pain and my period was normal  It honestly was the best I have felt in 4yrs  Then March and now April all the pain I was having came back and even a little worse than before  Wanted to know what we can do? After feeling great finally for two months then back to being in serve pain is crazy  Please let me know if I need to make an appointment to discuss more  Thank you!

## 2022-04-28 NOTE — TELEPHONE ENCOUNTER
Patient called to schedule an appointment with Dr Cynthia Whitten sent message to Meño Restrepo to schedule

## 2022-05-05 NOTE — PROGRESS NOTES
Assessment/Plan:    Pelvic pain  At this time, will try orlissa with add back  Safe and effective use of medication reviewed  If effective, can continue this medication for 2 years  If not effective, consider surgery  Did review  Hysterectomy, +/- oopherectomy as definitive treatment but with chronic pain there can be multiple causes  José Manuel Lopez continues to pursue healthy diet and exercise as well  Pelvic ultrasound ordered to be complete  Follow up 3 months  Diagnoses and all orders for this visit:    Endometriosis  -     Elagolix Sodium (Orilissa) 150 MG TABS; Take 1 tablet by mouth daily  -     norethindrone (Yancy) 0 35 MG tablet; Take 1 tablet (0 35 mg total) by mouth daily  -     US pelvis complete w transvaginal; Future    Pelvic pain  -     US pelvis complete w transvaginal; Future          Subjective:      Patient ID: Bao Triana is a 37 y o  female  Patient presents for a consult to discuss options  Heavy periods and pain    38 yo  with chronic pelvic pain  DId have about 2 months of relief after her last diagnositc laparoscopy but pain has returned  HEre to discuss options  Pain is diffuse  Constant  Menses are regular and heavy  (did have a break in cycle after laparoscopy)    IN the past has tried OC, depoprovera with no relief  DId offer these again but declines  Next step is the [facility] agonist treatment  Chad discussed  Also discussed role of surgery in treatment of pain  Gynecologic Exam  She complains of pelvic pain and vaginal bleeding  She reports no genital itching, genital lesions, genital odor, genital rash, missed menses or vaginal discharge  This is a chronic problem  The current episode started more than 1 month ago  The problem occurs intermittently  The problem has been waxing and waning since onset  The pain is moderate  The problem affects both sides   Pertinent negatives include no abdominal pain, anorexia, back pain, chills, constipation, diarrhea, discolored urine, dysuria, fever, flank pain, frequency, headaches, hematuria, joint swelling, nausea, rash, sore throat, urgency or vomiting  The vaginal bleeding is heavier than menses  Patient has been passing clots  Patient has not been passing tissue  The symptoms are aggravated by activity and tactile pressure  Past treatments include NSAIDs and heating pads  The treatment provided mild relief  She is sexually active  The patient's menstrual history has been regular  The following portions of the patient's history were reviewed and updated as appropriate:   She  has a past medical history of Abdominal pain, Anesthesia, Exercises 5 to 6 times per week, and Wears contact lenses  She   Patient Active Problem List    Diagnosis Date Noted    Pelvic pain 10/05/2021    Closed displaced fracture of neck of right fifth metacarpal bone 10/21/2019    Contracture of hand joint, 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    Encounter for gynecological examination 05/08/2018     She  has a past surgical history that includes pr open tx metacarpal fracture single ea bone (Right, 11/5/2019) and pr lap,lysis of adhesions (N/A, 11/16/2021)  Her family history includes Diabetes in her family; Heart disease in her family; No Known Problems in her father, maternal aunt, maternal aunt, maternal grandfather, maternal grandmother, maternal uncle, maternal uncle, mother, paternal aunt, paternal grandfather, paternal grandmother, paternal uncle, paternal uncle, paternal uncle, sister, sister, and son  She  reports that she has never smoked  She has never used smokeless tobacco  She reports current alcohol use of about 5 0 standard drinks of alcohol per week  She reports that she does not use drugs    Current Outpatient Medications   Medication Sig Dispense Refill    ibuprofen (MOTRIN) 600 mg tablet Take 1 tablet (600 mg total) by mouth every 6 (six) hours as needed for moderate pain 30 tablet 0    Multiple Vitamin (multivitamin) tablet Take 1 tablet by mouth daily      Elagolix Sodium (Orilissa) 150 MG TABS Take 1 tablet by mouth daily 30 tablet 11    norethindrone (Yancy) 0 35 MG tablet Take 1 tablet (0 35 mg total) by mouth daily 28 tablet 11     No current facility-administered medications for this visit  Current Outpatient Medications on File Prior to Visit   Medication Sig    ibuprofen (MOTRIN) 600 mg tablet Take 1 tablet (600 mg total) by mouth every 6 (six) hours as needed for moderate pain    Multiple Vitamin (multivitamin) tablet Take 1 tablet by mouth daily     No current facility-administered medications on file prior to visit  She has No Known Allergies       Review of Systems   Constitutional: Negative for activity change, appetite change, chills, fatigue and fever  HENT: Negative for rhinorrhea, sneezing and sore throat  Eyes: Negative for visual disturbance  Respiratory: Negative for cough, shortness of breath and wheezing  Cardiovascular: Negative for chest pain, palpitations and leg swelling  Gastrointestinal: Negative for abdominal distention, abdominal pain, anorexia, constipation, diarrhea, nausea and vomiting  Genitourinary: Positive for pelvic pain  Negative for difficulty urinating, dysuria, flank pain, frequency, hematuria, missed menses, urgency and vaginal discharge  Musculoskeletal: Negative for back pain  Skin: Negative for rash  Neurological: Negative for syncope, light-headedness and headaches           Objective:      /78   Ht 5' 4" (1 626 m)   Wt 80 1 kg (176 lb 9 6 oz)   BMI 30 31 kg/m²

## 2022-05-09 ENCOUNTER — OFFICE VISIT (OUTPATIENT)
Dept: OBGYN CLINIC | Facility: CLINIC | Age: 42
End: 2022-05-09
Payer: COMMERCIAL

## 2022-05-09 VITALS
WEIGHT: 176.6 LBS | HEIGHT: 64 IN | DIASTOLIC BLOOD PRESSURE: 78 MMHG | SYSTOLIC BLOOD PRESSURE: 122 MMHG | BODY MASS INDEX: 30.15 KG/M2

## 2022-05-09 DIAGNOSIS — R10.2 PELVIC PAIN: ICD-10-CM

## 2022-05-09 DIAGNOSIS — N80.9 ENDOMETRIOSIS: Primary | ICD-10-CM

## 2022-05-09 PROCEDURE — 99214 OFFICE O/P EST MOD 30 MIN: CPT | Performed by: OBSTETRICS & GYNECOLOGY

## 2022-05-09 RX ORDER — ACETAMINOPHEN AND CODEINE PHOSPHATE 120; 12 MG/5ML; MG/5ML
1 SOLUTION ORAL DAILY
Qty: 28 TABLET | Refills: 11 | Status: SHIPPED | OUTPATIENT
Start: 2022-05-09

## 2022-05-09 RX ORDER — ELAGOLIX 150 MG/1
1 TABLET, FILM COATED ORAL DAILY
Qty: 30 TABLET | Refills: 11 | Status: SHIPPED | OUTPATIENT
Start: 2022-05-09

## 2022-05-11 NOTE — ASSESSMENT & PLAN NOTE
At this time, will try orlissa with add back  Safe and effective use of medication reviewed  If effective, can continue this medication for 2 years  If not effective, consider surgery  Did review  Hysterectomy, +/- oopherectomy as definitive treatment but with chronic pain there can be multiple causes  Mary Guillermo continues to pursue healthy diet and exercise as well  Pelvic ultrasound ordered to be complete  Follow up 3 months

## 2022-08-15 ENCOUNTER — ANNUAL EXAM (OUTPATIENT)
Dept: OBGYN CLINIC | Facility: CLINIC | Age: 42
End: 2022-08-15
Payer: COMMERCIAL

## 2022-08-15 VITALS
HEIGHT: 64 IN | DIASTOLIC BLOOD PRESSURE: 74 MMHG | SYSTOLIC BLOOD PRESSURE: 112 MMHG | WEIGHT: 177.8 LBS | BODY MASS INDEX: 30.35 KG/M2

## 2022-08-15 DIAGNOSIS — Z12.31 ENCOUNTER FOR SCREENING MAMMOGRAM FOR MALIGNANT NEOPLASM OF BREAST: ICD-10-CM

## 2022-08-15 DIAGNOSIS — Z01.419 ENCOUNTER FOR GYNECOLOGICAL EXAMINATION: ICD-10-CM

## 2022-08-15 DIAGNOSIS — Z12.4 SCREENING FOR CERVICAL CANCER: Primary | ICD-10-CM

## 2022-08-15 DIAGNOSIS — R10.2 PELVIC PAIN: ICD-10-CM

## 2022-08-15 DIAGNOSIS — N80.9 ENDOMETRIOSIS: ICD-10-CM

## 2022-08-15 PROCEDURE — S0612 ANNUAL GYNECOLOGICAL EXAMINA: HCPCS | Performed by: OBSTETRICS & GYNECOLOGY

## 2022-08-15 PROCEDURE — G0145 SCR C/V CYTO,THINLAYER,RESCR: HCPCS | Performed by: OBSTETRICS & GYNECOLOGY

## 2022-08-15 PROCEDURE — G0476 HPV COMBO ASSAY CA SCREEN: HCPCS | Performed by: OBSTETRICS & GYNECOLOGY

## 2022-08-15 RX ORDER — ELAGOLIX 150 MG/1
1 TABLET, FILM COATED ORAL DAILY
Qty: 90 TABLET | Refills: 3 | Status: SHIPPED | OUTPATIENT
Start: 2022-08-15

## 2022-08-15 RX ORDER — ACETAMINOPHEN AND CODEINE PHOSPHATE 120; 12 MG/5ML; MG/5ML
1 SOLUTION ORAL DAILY
Qty: 84 TABLET | Refills: 3 | Status: SHIPPED | OUTPATIENT
Start: 2022-08-15

## 2022-08-15 NOTE — PROGRESS NOTES
Assessment/Plan:    Encounter for gynecological examination  Pap/HPV collected  Mammogram ordered    Plan for hysterectomy due to pelvic pain  Encourage healthy diet, exercise, Calcium 1200mg per day and at least 800 iu Vitamin D daily  Pelvic pain  Has noted improvement on Orlissa and florian  Does not want to continue medication indefinetly  Reviewed treatment options for endometriosis, chronic pelvic pian  Patient wants definitive treatment  Plan for Total Laparoscopic Hysterectomy with bilateral salpingectomy  Possible oopherectomy    We reviewed the risks of the surgery including but not limited to infection, bleeding, injury to nearby organs (bowel  bladder, ureter, blood vessel, nerve) and possible long term consequences of such injury, as well as possibility of oopherectomy, conversion to laparotomy, need for blood transfusion and other resuscitative measures  Diagnoses and all orders for this visit:    Screening for cervical cancer  -     Liquid-based pap, screening  -     HPV High Risk    Encounter for screening mammogram for malignant neoplasm of breast  -     Mammo screening bilateral w 3d & cad; Future    Endometriosis  -     Elagolix Sodium (Orilissa) 150 MG TABS; Take 1 tablet by mouth daily  -     norethindrone (Yancy) 0 35 MG tablet; Take 1 tablet (0 35 mg total) by mouth daily    Encounter for gynecological examination    Pelvic pain          Subjective:      Patient ID: Colby Mcgraw is a 37 y o  female  Patient presents for a routine annual visit  Menarche- 12Y/O  Last Pap Smear- 17 neg/neg due today  LMP-5/10/22  Birth control-none  Mammogram-22 order placed for next year    Non smoker  Social drinker  Currently sexually active  No family history of uterine, ovarian, cervical or breast cancer  No concerns/questions for today's visit    Patient prescribed Orilissa and Yancy at last OV 22  States these medications are working well for her  Has not had period since 5/10/22  Patient did not go for US  Patient willing to still go if provider would like her to  Has noted improvement in her pain on Orlissa  Does not want to continue this medication indefinitely  Wants definitive treatment for endometriosis  Reviewed medication options, vs surgical options  Plan for hysterectomy  Gynecologic Exam  She reports no genital itching, genital lesions, genital odor, genital rash, pelvic pain, vaginal bleeding or vaginal discharge  The patient is experiencing no pain  Pertinent negatives include no chills, constipation, diarrhea, fever, nausea, sore throat or vomiting  She is sexually active  The following portions of the patient's history were reviewed and updated as appropriate:   She  has a past medical history of Abdominal pain, Anesthesia, Exercises 5 to 6 times per week, and Wears contact lenses  She   Patient Active Problem List    Diagnosis Date Noted    Pelvic pain 10/05/2021    Closed displaced fracture of neck of right fifth metacarpal bone 10/21/2019    Contracture of hand joint, 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    Encounter for gynecological examination 05/08/2018     She  has a past surgical history that includes pr open tx metacarpal fracture single ea bone (Right, 11/5/2019) and pr lap,lysis of adhesions (N/A, 11/16/2021)  Her family history includes Diabetes in her family; Heart disease in her family; No Known Problems in her father, maternal aunt, maternal aunt, maternal grandfather, maternal grandmother, maternal uncle, maternal uncle, mother, paternal aunt, paternal grandfather, paternal grandmother, paternal uncle, paternal uncle, paternal uncle, sister, sister, and son  She  reports that she has never smoked  She has never used smokeless tobacco  She reports current alcohol use of about 5 0 standard drinks of alcohol per week  She reports that she does not use drugs    Current Outpatient Medications   Medication Sig Dispense Refill    Elagolix Sodium (Orilissa) 150 MG TABS Take 1 tablet by mouth daily 90 tablet 3    ibuprofen (MOTRIN) 600 mg tablet Take 1 tablet (600 mg total) by mouth every 6 (six) hours as needed for moderate pain 30 tablet 0    Multiple Vitamin (multivitamin) tablet Take 1 tablet by mouth daily      norethindrone (Yancy) 0 35 MG tablet Take 1 tablet (0 35 mg total) by mouth daily 84 tablet 3     No current facility-administered medications for this visit  Current Outpatient Medications on File Prior to Visit   Medication Sig    ibuprofen (MOTRIN) 600 mg tablet Take 1 tablet (600 mg total) by mouth every 6 (six) hours as needed for moderate pain    Multiple Vitamin (multivitamin) tablet Take 1 tablet by mouth daily     No current facility-administered medications on file prior to visit  She has No Known Allergies       Review of Systems   Constitutional: Negative for activity change, appetite change, chills, fatigue and fever  HENT: Negative for rhinorrhea, sneezing and sore throat  Eyes: Negative for visual disturbance  Respiratory: Negative for cough, shortness of breath and wheezing  Cardiovascular: Negative for chest pain, palpitations and leg swelling  Gastrointestinal: Negative for abdominal distention, constipation, diarrhea, nausea and vomiting  Genitourinary: Negative for difficulty urinating, pelvic pain and vaginal discharge  Neurological: Negative for syncope and light-headedness  Objective:      /74 (BP Location: Left arm, Patient Position: Sitting, Cuff Size: Standard)   Ht 5' 4" (1 626 m)   Wt 80 6 kg (177 lb 12 8 oz)   LMP 05/10/2022 (Approximate)   BMI 30 52 kg/m²          Physical Exam  Constitutional:       General: She is not in acute distress  Appearance: She is well-developed  She is not diaphoretic  Neck:      Vascular: No JVD  Cardiovascular:      Rate and Rhythm: Normal rate and regular rhythm  Heart sounds: Normal heart sounds   No murmur heard     No friction rub  No gallop  Pulmonary:      Effort: Pulmonary effort is normal  No respiratory distress  Breath sounds: Normal breath sounds  Abdominal:      General: Bowel sounds are normal  There is no distension  Palpations: Abdomen is soft  Tenderness: There is no abdominal tenderness  There is no guarding or rebound  Genitourinary:     Labia:         Right: No rash, tenderness or lesion  Left: No rash, tenderness or lesion  Vagina: Normal  No vaginal discharge, erythema or tenderness  Cervix: No cervical motion tenderness, discharge or friability  Uterus: Not deviated, not enlarged, not fixed and not tender  Adnexa:         Right: No mass, tenderness or fullness  Left: No mass, tenderness or fullness  Musculoskeletal:      Cervical back: Neck supple  Right lower leg: No edema  Left lower leg: No edema  Lymphadenopathy:      Cervical: No cervical adenopathy  Neurological:      Mental Status: She is alert and oriented to person, place, and time  Deep Tendon Reflexes: Reflexes are normal and symmetric

## 2022-08-15 NOTE — PATIENT INSTRUCTIONS

## 2022-08-16 ENCOUNTER — TELEPHONE (OUTPATIENT)
Dept: OBGYN CLINIC | Facility: CLINIC | Age: 42
End: 2022-08-16

## 2022-08-16 NOTE — ASSESSMENT & PLAN NOTE
Pap/HPV collected  Mammogram ordered    Plan for hysterectomy due to pelvic pain  Encourage healthy diet, exercise, Calcium 1200mg per day and at least 800 iu Vitamin D daily

## 2022-08-16 NOTE — ASSESSMENT & PLAN NOTE
Has noted improvement on Orlissa and florian  Does not want to continue medication indefinetly  Reviewed treatment options for endometriosis, chronic pelvic pian  Patient wants definitive treatment  Plan for Total Laparoscopic Hysterectomy with bilateral salpingectomy  Possible oopherectomy    We reviewed the risks of the surgery including but not limited to infection, bleeding, injury to nearby organs (bowel  bladder, ureter, blood vessel, nerve) and possible long term consequences of such injury, as well as possibility of oopherectomy, conversion to laparotomy, need for blood transfusion and other resuscitative measures

## 2022-08-17 LAB
HPV HR 12 DNA CVX QL NAA+PROBE: NEGATIVE
HPV16 DNA CVX QL NAA+PROBE: NEGATIVE
HPV18 DNA CVX QL NAA+PROBE: NEGATIVE

## 2022-08-18 LAB
LAB AP GYN PRIMARY INTERPRETATION: NORMAL
Lab: NORMAL

## 2022-08-23 NOTE — TELEPHONE ENCOUNTER
Talked to patient she is scheduled for her surgical procedure on 11/15/2022 with Dr Renny Sam in the [address] [place] [address]

## 2022-10-27 ENCOUNTER — APPOINTMENT (OUTPATIENT)
Dept: LAB | Facility: MEDICAL CENTER | Age: 42
End: 2022-10-27
Payer: COMMERCIAL

## 2022-10-27 DIAGNOSIS — Z01.818 PRE-OP TESTING: ICD-10-CM

## 2022-10-27 LAB
ANION GAP SERPL CALCULATED.3IONS-SCNC: 5 MMOL/L (ref 4–13)
BUN SERPL-MCNC: 15 MG/DL (ref 5–25)
CALCIUM SERPL-MCNC: 9.2 MG/DL (ref 8.3–10.1)
CHLORIDE SERPL-SCNC: 106 MMOL/L (ref 96–108)
CO2 SERPL-SCNC: 26 MMOL/L (ref 21–32)
CREAT SERPL-MCNC: 0.96 MG/DL (ref 0.6–1.3)
ERYTHROCYTE [DISTWIDTH] IN BLOOD BY AUTOMATED COUNT: 12.4 % (ref 11.6–15.1)
EST. AVERAGE GLUCOSE BLD GHB EST-MCNC: 105 MG/DL
GFR SERPL CREATININE-BSD FRML MDRD: 74 ML/MIN/1.73SQ M
GLUCOSE P FAST SERPL-MCNC: 102 MG/DL (ref 65–99)
HBA1C MFR BLD: 5.3 %
HCT VFR BLD AUTO: 40.9 % (ref 34.8–46.1)
HGB BLD-MCNC: 13.7 G/DL (ref 11.5–15.4)
MCH RBC QN AUTO: 32.2 PG (ref 26.8–34.3)
MCHC RBC AUTO-ENTMCNC: 33.5 G/DL (ref 31.4–37.4)
MCV RBC AUTO: 96 FL (ref 82–98)
PLATELET # BLD AUTO: 280 THOUSANDS/UL (ref 149–390)
PMV BLD AUTO: 10.6 FL (ref 8.9–12.7)
POTASSIUM SERPL-SCNC: 4.2 MMOL/L (ref 3.5–5.3)
RBC # BLD AUTO: 4.26 MILLION/UL (ref 3.81–5.12)
SODIUM SERPL-SCNC: 137 MMOL/L (ref 135–147)
WBC # BLD AUTO: 5.99 THOUSAND/UL (ref 4.31–10.16)

## 2022-10-27 PROCEDURE — 85027 COMPLETE CBC AUTOMATED: CPT

## 2022-10-27 PROCEDURE — 87086 URINE CULTURE/COLONY COUNT: CPT

## 2022-10-27 PROCEDURE — 36415 COLL VENOUS BLD VENIPUNCTURE: CPT

## 2022-10-27 PROCEDURE — 80048 BASIC METABOLIC PNL TOTAL CA: CPT

## 2022-10-27 PROCEDURE — 83036 HEMOGLOBIN GLYCOSYLATED A1C: CPT

## 2022-10-29 LAB — BACTERIA UR CULT: NORMAL

## 2022-11-02 ENCOUNTER — APPOINTMENT (OUTPATIENT)
Dept: LAB | Facility: MEDICAL CENTER | Age: 42
End: 2022-11-02

## 2022-11-02 DIAGNOSIS — Z01.818 PRE-OP TESTING: ICD-10-CM

## 2022-11-08 NOTE — H&P
Assessment/Plan:    Pelvic pain  Has noted improvement on Orlissa and florian  Does not want to continue medication indefinetly  Reviewed treatment options for endometriosis, chronic pelvic pian  Patient wants definitive treatment  Plan for Total Laparoscopic Hysterectomy with bilateral salpingectomy  Possible oopherectomy    We reviewed the risks of the surgery including but not limited to infection, bleeding, injury to nearby organs (bowel  bladder, ureter, blood vessel, nerve) and possible long term consequences of such injury, as well as possibility of oopherectomy, conversion to laparotomy, need for blood transfusion and other resuscitative measures  Diagnoses and all orders for this visit:    Screening for cervical cancer  -     Liquid-based pap, screening  -     HPV High Risk    Encounter for screening mammogram for malignant neoplasm of breast  -     Mammo screening bilateral w 3d & cad; Future    Endometriosis  -     Elagolix Sodium (Orilissa) 150 MG TABS; Take 1 tablet by mouth daily  -     norethindrone (Yancy) 0 35 MG tablet; Take 1 tablet (0 35 mg total) by mouth daily    Encounter for gynecological examination    Pelvic pain          Subjective:      Patient ID: Tu Cruz is a 40 y o  female  [id number]  Non smoker  Social drinker  Currently sexually active    Patient prescribed Orilissa and Yancy at last OV 5/9/22  States these medications are working well for her  Has not had period since 5/10/22  Patient did not go for US  Patient willing to still go if provider would like her to  Has noted improvement in her pain on Orlissa  Does not want to continue this medication indefinitely  Wants definitive treatment for endometriosis  Reviewed medication options, vs surgical options  Plan for hysterectomy  Had diagnostic laparoscopy about a year ago, no adhesions noted at that time  Gynecologic Exam  She complains of pelvic pain   She reports no genital itching, genital lesions, genital odor, genital rash, vaginal bleeding or vaginal discharge  This is a chronic problem  The current episode started more than 1 year ago  The problem occurs intermittently  The problem has been gradually improving since onset  The pain is mild  The problem affects both sides  Pertinent negatives include no chills, constipation, diarrhea, fever, nausea, sore throat or vomiting  She is sexually active  The following portions of the patient's history were reviewed and updated as appropriate:   She  has a past medical history of Abdominal pain, Anesthesia, Exercises 5 to 6 times per week, and Wears contact lenses  She   Patient Active Problem List    Diagnosis Date Noted   • Pelvic pain 10/05/2021   • Closed displaced fracture of neck of right fifth metacarpal bone 10/21/2019   • Contracture of hand joint, 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   • Encounter for gynecological examination 05/08/2018     She  has a past surgical history that includes pr open tx metacarpal fracture single ea bone (Right, 11/5/2019) and pr lap,lysis of adhesions (N/A, 11/16/2021)  Her family history includes Diabetes in her family; Heart disease in her family; No Known Problems in her father, maternal aunt, maternal aunt, maternal grandfather, maternal grandmother, maternal uncle, maternal uncle, mother, paternal aunt, paternal grandfather, paternal grandmother, paternal uncle, paternal uncle, paternal uncle, sister, sister, and son  She  reports that she has never smoked  She has never used smokeless tobacco  She reports current alcohol use of about 5 0 standard drinks of alcohol per week  She reports that she does not use drugs  No current facility-administered medications for this encounter       Current Outpatient Medications   Medication Sig Dispense Refill   • Elagolix Sodium (Orilissa) 150 MG TABS Take 1 tablet by mouth daily 90 tablet 3   • ibuprofen (MOTRIN) 600 mg tablet Take 1 tablet (600 mg total) by mouth every 6 (six) hours as needed for moderate pain 30 tablet 0   • Multiple Vitamin (multivitamin) tablet Take 1 tablet by mouth daily     • norethindrone (Yancy) 0 35 MG tablet Take 1 tablet (0 35 mg total) by mouth daily 84 tablet 3     No current facility-administered medications on file prior to encounter  Current Outpatient Medications on File Prior to Encounter   Medication Sig   • Elagolix Sodium (Orilissa) 150 MG TABS Take 1 tablet by mouth daily   • ibuprofen (MOTRIN) 600 mg tablet Take 1 tablet (600 mg total) by mouth every 6 (six) hours as needed for moderate pain   • Multiple Vitamin (multivitamin) tablet Take 1 tablet by mouth daily   • norethindrone (Yancy) 0 35 MG tablet Take 1 tablet (0 35 mg total) by mouth daily     She has No Known Allergies       Review of Systems   Constitutional: Negative for activity change, appetite change, chills, fatigue and fever  HENT: Negative for rhinorrhea, sneezing and sore throat  Eyes: Negative for visual disturbance  Respiratory: Negative for cough, shortness of breath and wheezing  Cardiovascular: Negative for chest pain, palpitations and leg swelling  Gastrointestinal: Negative for abdominal distention, constipation, diarrhea, nausea and vomiting  Genitourinary: Positive for pelvic pain  Negative for difficulty urinating and vaginal discharge  Neurological: Negative for syncope and light-headedness  Objective: There were no vitals taken for this visit  Physical Exam  Constitutional:       General: She is not in acute distress  Appearance: She is well-developed  She is not diaphoretic  Neck:      Vascular: No JVD  Cardiovascular:      Rate and Rhythm: Normal rate and regular rhythm  Heart sounds: Normal heart sounds  No murmur heard  No friction rub  No gallop  Pulmonary:      Effort: Pulmonary effort is normal  No respiratory distress  Breath sounds: Normal breath sounds     Abdominal:      General: Bowel sounds are normal  There is no distension  Palpations: Abdomen is soft  Tenderness: There is no abdominal tenderness  There is no guarding or rebound  Genitourinary:     Labia:         Right: No rash, tenderness or lesion  Left: No rash, tenderness or lesion  Vagina: Normal  No vaginal discharge, erythema or tenderness  Cervix: No cervical motion tenderness, discharge or friability  Uterus: Not deviated, not enlarged, not fixed and not tender  Adnexa:         Right: No mass, tenderness or fullness  Left: No mass, tenderness or fullness  Musculoskeletal:      Cervical back: Neck supple  Right lower leg: No edema  Left lower leg: No edema  Lymphadenopathy:      Cervical: No cervical adenopathy  Neurological:      Mental Status: She is alert and oriented to person, place, and time  Deep Tendon Reflexes: Reflexes are normal and symmetric

## 2022-11-10 RX ORDER — OMEGA-3S/DHA/EPA/FISH OIL/D3 300MG-1000
400 CAPSULE ORAL DAILY
COMMUNITY

## 2022-11-10 RX ORDER — OMEGA-3 FATTY ACIDS CAP DELAYED RELEASE 1000 MG 1000 MG
CAPSULE DELAYED RELEASE ORAL
COMMUNITY

## 2022-11-10 RX ORDER — RIBOFLAVIN (VITAMIN B2) 100 MG
100 TABLET ORAL DAILY
COMMUNITY

## 2022-11-10 NOTE — PRE-PROCEDURE INSTRUCTIONS
Pre-Surgery Instructions:   Medication Instructions   • Ascorbic Acid (vitamin C) 100 MG tablet Avoid 1 week prior to surgery    • B Complex Vitamins (VITAMIN B COMPLEX PO) Avoid 1 week prior to surgery    • cholecalciferol (VITAMIN D3) 400 units tablet Avoid 1 week prior to surgery    • ibuprofen (MOTRIN) 600 mg tablet Hold for at least 3 days prior to DOS   • Multiple Vitamin (multivitamin) tablet Avoid 1 week prior to surgery    • norethindrone (Yancy) 0 35 MG tablet Continue to take as prescribed including DOS with a small sip of water, unless usually taken at night   • Omega-3 Fatty Acids (Fish Oil) 1000 MG CPDR Avoid 1 week prior to surgery    • Probiotic Product (PRO-BIOTIC BLEND PO) Avoid 1 week prior to surgery    • Turmeric 1053 MG TABS Avoid 1 week prior to surgery     Avoid non-prescribed ASPIRIN, OTC vitamins and NSAIDS prior to surgery  Tylenol okay PRN  Continue scheduled medications excluding DOS  Avoid smoking prior to Surgery   Avoid alcohol, illicit drugs and marijuana for 24 hours prior to DOS  NPO instructions given: no food, water or anything else by mouth after midnight prior to surgery  Patient has ensures and instructions from surgeon's office  Shower the night before and the morning of surgery using CHG wash or antibacterial soap if CHG is not indicated  Avoid shaving for 24 hours prior to DOS  Avoid using lotions, powders, oils, makeup, hair products, etc  DOS  Patient given up to date visitor guidelines  A ride home after surgery is needed- failure to have a ride can result in cancellation  Remove jewelry and not to bring valuables DOS  Dentures and contact lenses will have to be removed for surgery  Patient understands he or she will receive a call the afternoon before surgery regarding an arrival time  If you have any changes in your health before DOS please call the surgeon's office  Patient verbalized understanding of all instructions

## 2022-11-15 ENCOUNTER — HOSPITAL ENCOUNTER (OUTPATIENT)
Facility: HOSPITAL | Age: 42
Setting detail: OUTPATIENT SURGERY
Discharge: HOME | End: 2022-11-16
Attending: OBSTETRICS & GYNECOLOGY | Admitting: OBSTETRICS & GYNECOLOGY

## 2022-11-15 ENCOUNTER — ANESTHESIA (OUTPATIENT)
Dept: PERIOP | Facility: HOSPITAL | Age: 42
End: 2022-11-15

## 2022-11-15 ENCOUNTER — ANESTHESIA EVENT (OUTPATIENT)
Dept: PERIOP | Facility: HOSPITAL | Age: 42
End: 2022-11-15

## 2022-11-15 VITALS
HEART RATE: 101 BPM | SYSTOLIC BLOOD PRESSURE: 105 MMHG | OXYGEN SATURATION: 96 % | BODY MASS INDEX: 30.94 KG/M2 | HEIGHT: 64 IN | RESPIRATION RATE: 18 BRPM | WEIGHT: 181.22 LBS | TEMPERATURE: 98.3 F | DIASTOLIC BLOOD PRESSURE: 72 MMHG

## 2022-11-15 DIAGNOSIS — G89.18 POSTOPERATIVE PAIN: ICD-10-CM

## 2022-11-15 DIAGNOSIS — Z90.710 S/P LAPAROSCOPIC HYSTERECTOMY: Primary | ICD-10-CM

## 2022-11-15 DIAGNOSIS — R10.2 PELVIC PAIN IN FEMALE: ICD-10-CM

## 2022-11-15 LAB
EXT PREGNANCY TEST URINE: NEGATIVE
EXT. CONTROL: NORMAL
GLUCOSE SERPL-MCNC: 104 MG/DL (ref 65–140)

## 2022-11-15 RX ORDER — MAGNESIUM HYDROXIDE 1200 MG/15ML
LIQUID ORAL AS NEEDED
Status: DISCONTINUED | OUTPATIENT
Start: 2022-11-15 | End: 2022-11-15 | Stop reason: HOSPADM

## 2022-11-15 RX ORDER — PROPOFOL 10 MG/ML
INJECTION, EMULSION INTRAVENOUS AS NEEDED
Status: DISCONTINUED | OUTPATIENT
Start: 2022-11-15 | End: 2022-11-15

## 2022-11-15 RX ORDER — OXYCODONE HYDROCHLORIDE 10 MG/1
10 TABLET ORAL EVERY 4 HOURS PRN
Status: DISCONTINUED | OUTPATIENT
Start: 2022-11-15 | End: 2022-11-15

## 2022-11-15 RX ORDER — PROMETHAZINE HYDROCHLORIDE 25 MG/ML
25 INJECTION, SOLUTION INTRAMUSCULAR; INTRAVENOUS ONCE AS NEEDED
Status: DISCONTINUED | OUTPATIENT
Start: 2022-11-15 | End: 2022-11-15 | Stop reason: HOSPADM

## 2022-11-15 RX ORDER — HYDROMORPHONE HCL 110MG/55ML
PATIENT CONTROLLED ANALGESIA SYRINGE INTRAVENOUS AS NEEDED
Status: DISCONTINUED | OUTPATIENT
Start: 2022-11-15 | End: 2022-11-15

## 2022-11-15 RX ORDER — MIDAZOLAM HYDROCHLORIDE 2 MG/2ML
INJECTION, SOLUTION INTRAMUSCULAR; INTRAVENOUS AS NEEDED
Status: DISCONTINUED | OUTPATIENT
Start: 2022-11-15 | End: 2022-11-15

## 2022-11-15 RX ORDER — ROCURONIUM BROMIDE 10 MG/ML
INJECTION, SOLUTION INTRAVENOUS AS NEEDED
Status: DISCONTINUED | OUTPATIENT
Start: 2022-11-15 | End: 2022-11-15

## 2022-11-15 RX ORDER — OXYCODONE HYDROCHLORIDE 5 MG/1
5 TABLET ORAL EVERY 4 HOURS PRN
Status: DISCONTINUED | OUTPATIENT
Start: 2022-11-15 | End: 2022-11-15

## 2022-11-15 RX ORDER — CEFAZOLIN SODIUM 2 G/50ML
2000 SOLUTION INTRAVENOUS ONCE
Status: COMPLETED | OUTPATIENT
Start: 2022-11-15 | End: 2022-11-15

## 2022-11-15 RX ORDER — HYDROMORPHONE HCL/PF 1 MG/ML
1 SYRINGE (ML) INJECTION EVERY 4 HOURS PRN
Status: DISCONTINUED | OUTPATIENT
Start: 2022-11-15 | End: 2022-11-15

## 2022-11-15 RX ORDER — HYDROMORPHONE HCL/PF 1 MG/ML
0.5 SYRINGE (ML) INJECTION EVERY 4 HOURS PRN
Status: DISCONTINUED | OUTPATIENT
Start: 2022-11-15 | End: 2022-11-16 | Stop reason: HOSPADM

## 2022-11-15 RX ORDER — KETOROLAC TROMETHAMINE 30 MG/ML
15 INJECTION, SOLUTION INTRAMUSCULAR; INTRAVENOUS EVERY 6 HOURS SCHEDULED
Status: DISCONTINUED | OUTPATIENT
Start: 2022-11-15 | End: 2022-11-16 | Stop reason: HOSPADM

## 2022-11-15 RX ORDER — DEXMEDETOMIDINE HYDROCHLORIDE 100 UG/ML
INJECTION, SOLUTION INTRAVENOUS AS NEEDED
Status: DISCONTINUED | OUTPATIENT
Start: 2022-11-15 | End: 2022-11-15

## 2022-11-15 RX ORDER — HYDROMORPHONE HCL/PF 1 MG/ML
0.5 SYRINGE (ML) INJECTION ONCE
Status: COMPLETED | OUTPATIENT
Start: 2022-11-15 | End: 2022-11-15

## 2022-11-15 RX ORDER — GABAPENTIN 100 MG/1
100 CAPSULE ORAL ONCE
Status: COMPLETED | OUTPATIENT
Start: 2022-11-15 | End: 2022-11-15

## 2022-11-15 RX ORDER — OXYCODONE HYDROCHLORIDE 10 MG/1
10 TABLET ORAL EVERY 4 HOURS PRN
Status: DISCONTINUED | OUTPATIENT
Start: 2022-11-15 | End: 2022-11-16 | Stop reason: HOSPADM

## 2022-11-15 RX ORDER — DEXAMETHASONE SODIUM PHOSPHATE 10 MG/ML
INJECTION, SOLUTION INTRAMUSCULAR; INTRAVENOUS AS NEEDED
Status: DISCONTINUED | OUTPATIENT
Start: 2022-11-15 | End: 2022-11-15

## 2022-11-15 RX ORDER — ACETAMINOPHEN 325 MG/1
975 TABLET ORAL EVERY 6 HOURS PRN
Status: DISCONTINUED | OUTPATIENT
Start: 2022-11-15 | End: 2022-11-15

## 2022-11-15 RX ORDER — PHENAZOPYRIDINE HYDROCHLORIDE 100 MG/1
100 TABLET, FILM COATED ORAL ONCE
Status: COMPLETED | OUTPATIENT
Start: 2022-11-15 | End: 2022-11-15

## 2022-11-15 RX ORDER — SENNOSIDES 8.6 MG
650 CAPSULE ORAL EVERY 8 HOURS PRN
Qty: 30 TABLET | Refills: 0
Start: 2022-11-15

## 2022-11-15 RX ORDER — SODIUM CHLORIDE, SODIUM LACTATE, POTASSIUM CHLORIDE, CALCIUM CHLORIDE 600; 310; 30; 20 MG/100ML; MG/100ML; MG/100ML; MG/100ML
INJECTION, SOLUTION INTRAVENOUS CONTINUOUS PRN
Status: DISCONTINUED | OUTPATIENT
Start: 2022-11-15 | End: 2022-11-15

## 2022-11-15 RX ORDER — ACETAMINOPHEN 325 MG/1
650 TABLET ORAL EVERY 6 HOURS SCHEDULED
Status: DISCONTINUED | OUTPATIENT
Start: 2022-11-15 | End: 2022-11-16 | Stop reason: HOSPADM

## 2022-11-15 RX ORDER — LIDOCAINE HYDROCHLORIDE AND EPINEPHRINE 10; 10 MG/ML; UG/ML
INJECTION, SOLUTION INFILTRATION; PERINEURAL AS NEEDED
Status: DISCONTINUED | OUTPATIENT
Start: 2022-11-15 | End: 2022-11-15 | Stop reason: HOSPADM

## 2022-11-15 RX ORDER — KETOROLAC TROMETHAMINE 30 MG/ML
INJECTION, SOLUTION INTRAMUSCULAR; INTRAVENOUS AS NEEDED
Status: DISCONTINUED | OUTPATIENT
Start: 2022-11-15 | End: 2022-11-15

## 2022-11-15 RX ORDER — IBUPROFEN 600 MG/1
600 TABLET ORAL EVERY 6 HOURS PRN
Qty: 30 TABLET | Refills: 0 | Status: SHIPPED | OUTPATIENT
Start: 2022-11-15

## 2022-11-15 RX ORDER — LIDOCAINE HYDROCHLORIDE 20 MG/ML
INJECTION, SOLUTION EPIDURAL; INFILTRATION; INTRACAUDAL; PERINEURAL AS NEEDED
Status: DISCONTINUED | OUTPATIENT
Start: 2022-11-15 | End: 2022-11-15

## 2022-11-15 RX ORDER — OXYCODONE HYDROCHLORIDE 5 MG/1
5 TABLET ORAL EVERY 4 HOURS PRN
Qty: 15 TABLET | Refills: 0 | Status: SHIPPED | OUTPATIENT
Start: 2022-11-15 | End: 2022-11-25

## 2022-11-15 RX ORDER — IBUPROFEN 600 MG/1
600 TABLET ORAL EVERY 6 HOURS PRN
Status: DISCONTINUED | OUTPATIENT
Start: 2022-11-15 | End: 2022-11-15

## 2022-11-15 RX ORDER — ONDANSETRON 2 MG/ML
INJECTION INTRAMUSCULAR; INTRAVENOUS AS NEEDED
Status: DISCONTINUED | OUTPATIENT
Start: 2022-11-15 | End: 2022-11-15

## 2022-11-15 RX ORDER — GLYCOPYRROLATE 0.2 MG/ML
INJECTION INTRAMUSCULAR; INTRAVENOUS AS NEEDED
Status: DISCONTINUED | OUTPATIENT
Start: 2022-11-15 | End: 2022-11-15

## 2022-11-15 RX ORDER — KETOROLAC TROMETHAMINE 30 MG/ML
30 INJECTION, SOLUTION INTRAMUSCULAR; INTRAVENOUS ONCE
Status: COMPLETED | OUTPATIENT
Start: 2022-11-15 | End: 2022-11-15

## 2022-11-15 RX ORDER — HYDROMORPHONE HCL/PF 1 MG/ML
0.5 SYRINGE (ML) INJECTION
Status: DISCONTINUED | OUTPATIENT
Start: 2022-11-15 | End: 2022-11-15 | Stop reason: HOSPADM

## 2022-11-15 RX ORDER — FENTANYL CITRATE/PF 50 MCG/ML
25 SYRINGE (ML) INJECTION
Status: DISCONTINUED | OUTPATIENT
Start: 2022-11-15 | End: 2022-11-15 | Stop reason: HOSPADM

## 2022-11-15 RX ORDER — ACETAMINOPHEN 325 MG/1
975 TABLET ORAL ONCE
Status: COMPLETED | OUTPATIENT
Start: 2022-11-15 | End: 2022-11-15

## 2022-11-15 RX ORDER — OXYCODONE HYDROCHLORIDE 5 MG/1
5 TABLET ORAL EVERY 4 HOURS PRN
Status: DISCONTINUED | OUTPATIENT
Start: 2022-11-15 | End: 2022-11-16 | Stop reason: HOSPADM

## 2022-11-15 RX ORDER — IBUPROFEN 600 MG/1
600 TABLET ORAL EVERY 6 HOURS SCHEDULED
Status: DISCONTINUED | OUTPATIENT
Start: 2022-11-16 | End: 2022-11-16 | Stop reason: HOSPADM

## 2022-11-15 RX ORDER — FENTANYL CITRATE 50 UG/ML
INJECTION, SOLUTION INTRAMUSCULAR; INTRAVENOUS AS NEEDED
Status: DISCONTINUED | OUTPATIENT
Start: 2022-11-15 | End: 2022-11-15

## 2022-11-15 RX ADMIN — GABAPENTIN 100 MG: 100 CAPSULE ORAL at 07:24

## 2022-11-15 RX ADMIN — DEXMEDETOMIDINE HCL 4 MCG: 100 INJECTION INTRAVENOUS at 08:14

## 2022-11-15 RX ADMIN — HYDROMORPHONE HYDROCHLORIDE 0.5 MG: 1 INJECTION, SOLUTION INTRAMUSCULAR; INTRAVENOUS; SUBCUTANEOUS at 16:01

## 2022-11-15 RX ADMIN — ONDANSETRON 4 MG: 2 INJECTION INTRAMUSCULAR; INTRAVENOUS at 09:25

## 2022-11-15 RX ADMIN — KETOROLAC TROMETHAMINE 30 MG: 30 INJECTION, SOLUTION INTRAMUSCULAR at 16:29

## 2022-11-15 RX ADMIN — ACETAMINOPHEN 975 MG: 325 TABLET ORAL at 07:23

## 2022-11-15 RX ADMIN — SODIUM CHLORIDE, SODIUM LACTATE, POTASSIUM CHLORIDE, AND CALCIUM CHLORIDE: .6; .31; .03; .02 INJECTION, SOLUTION INTRAVENOUS at 08:05

## 2022-11-15 RX ADMIN — HYDROMORPHONE HYDROCHLORIDE 0.4 MG: 2 INJECTION, SOLUTION INTRAMUSCULAR; INTRAVENOUS; SUBCUTANEOUS at 08:42

## 2022-11-15 RX ADMIN — ROCURONIUM BROMIDE 5 MG: 10 INJECTION, SOLUTION INTRAVENOUS at 09:48

## 2022-11-15 RX ADMIN — HYDROMORPHONE HYDROCHLORIDE 0.4 MG: 2 INJECTION, SOLUTION INTRAMUSCULAR; INTRAVENOUS; SUBCUTANEOUS at 09:00

## 2022-11-15 RX ADMIN — DEXMEDETOMIDINE HCL 4 MCG: 100 INJECTION INTRAVENOUS at 09:50

## 2022-11-15 RX ADMIN — CEFAZOLIN SODIUM 2000 MG: 2 SOLUTION INTRAVENOUS at 08:06

## 2022-11-15 RX ADMIN — FENTANYL CITRATE 100 MCG: 50 INJECTION INTRAMUSCULAR; INTRAVENOUS at 08:14

## 2022-11-15 RX ADMIN — DEXMEDETOMIDINE HCL 8 MCG: 100 INJECTION INTRAVENOUS at 08:10

## 2022-11-15 RX ADMIN — SODIUM CHLORIDE, SODIUM LACTATE, POTASSIUM CHLORIDE, AND CALCIUM CHLORIDE: .6; .31; .03; .02 INJECTION, SOLUTION INTRAVENOUS at 10:01

## 2022-11-15 RX ADMIN — ROCURONIUM BROMIDE 50 MG: 10 INJECTION, SOLUTION INTRAVENOUS at 08:15

## 2022-11-15 RX ADMIN — KETOROLAC TROMETHAMINE 30 MG: 30 INJECTION, SOLUTION INTRAMUSCULAR at 10:04

## 2022-11-15 RX ADMIN — OXYCODONE HYDROCHLORIDE 5 MG: 5 TABLET ORAL at 12:59

## 2022-11-15 RX ADMIN — HYDROMORPHONE HYDROCHLORIDE 0.4 MG: 2 INJECTION, SOLUTION INTRAMUSCULAR; INTRAVENOUS; SUBCUTANEOUS at 09:48

## 2022-11-15 RX ADMIN — DEXMEDETOMIDINE HCL 8 MCG: 100 INJECTION INTRAVENOUS at 08:20

## 2022-11-15 RX ADMIN — HYDROMORPHONE HYDROCHLORIDE 0.4 MG: 2 INJECTION, SOLUTION INTRAMUSCULAR; INTRAVENOUS; SUBCUTANEOUS at 09:10

## 2022-11-15 RX ADMIN — ROCURONIUM BROMIDE 10 MG: 10 INJECTION, SOLUTION INTRAVENOUS at 09:11

## 2022-11-15 RX ADMIN — DEXAMETHASONE SODIUM PHOSPHATE 10 MG: 10 INJECTION INTRAMUSCULAR; INTRAVENOUS at 08:16

## 2022-11-15 RX ADMIN — SUGAMMADEX 200 MG: 100 INJECTION, SOLUTION INTRAVENOUS at 10:14

## 2022-11-15 RX ADMIN — MIDAZOLAM 2 MG: 1 INJECTION INTRAMUSCULAR; INTRAVENOUS at 08:07

## 2022-11-15 RX ADMIN — DEXMEDETOMIDINE HCL 4 MCG: 100 INJECTION INTRAVENOUS at 10:09

## 2022-11-15 RX ADMIN — GLYCOPYRROLATE 0.1 MG: 0.2 INJECTION, SOLUTION INTRAMUSCULAR; INTRAVENOUS at 08:35

## 2022-11-15 RX ADMIN — PROPOFOL 200 MG: 10 INJECTION, EMULSION INTRAVENOUS at 08:15

## 2022-11-15 RX ADMIN — PHENAZOPYRIDINE 100 MG: 100 TABLET ORAL at 07:24

## 2022-11-15 RX ADMIN — OXYCODONE HYDROCHLORIDE 10 MG: 10 TABLET ORAL at 17:02

## 2022-11-15 RX ADMIN — HYDROMORPHONE HYDROCHLORIDE 0.4 MG: 2 INJECTION, SOLUTION INTRAMUSCULAR; INTRAVENOUS; SUBCUTANEOUS at 09:18

## 2022-11-15 RX ADMIN — GLYCOPYRROLATE 0.1 MG: 0.2 INJECTION, SOLUTION INTRAMUSCULAR; INTRAVENOUS at 09:05

## 2022-11-15 RX ADMIN — LIDOCAINE HYDROCHLORIDE 100 MG: 20 INJECTION, SOLUTION EPIDURAL; INFILTRATION; INTRACAUDAL at 08:14

## 2022-11-15 RX ADMIN — OXYCODONE HYDROCHLORIDE 10 MG: 10 TABLET ORAL at 21:14

## 2022-11-15 NOTE — INTERVAL H&P NOTE
H&P reviewed  After examining the patient I find no changes in the patients condition since the H&P had been written      Vitals:    11/15/22 0713   BP: 130/81   Pulse: 75   Resp: 14   Temp: 98 4 °F (36 9 °C)   SpO2: 98%

## 2022-11-15 NOTE — ANESTHESIA PREPROCEDURE EVALUATION
Procedure:  HYSTERECTOMY LAPAROSCOPIC TOTAL WITH BILATERAL SALPINGO-OOPHERECTOMY (Bilateral Abdomen)    Relevant Problems   Other   (+) Pelvic pain        Physical Exam    Airway    Mallampati score: III  TM Distance: >3 FB  Neck ROM: full     Dental   No notable dental hx     Cardiovascular      Pulmonary  No wheezes,     Other Findings        Anesthesia Plan  ASA Score- 2     Anesthesia Type- general with ASA Monitors  Additional Monitors:   Airway Plan: ETT  Plan Factors-    Chart reviewed  Existing labs reviewed  Patient summary reviewed  Patient is not a current smoker  Patient did not smoke on day of surgery  Induction- intravenous  Postoperative Plan- Plan for postoperative opioid use  Planned trial extubation    Informed Consent- Anesthetic plan and risks discussed with patient  I personally reviewed this patient with the CRNA  Discussed and agreed on the Anesthesia Plan with the CRNA             This is a 40 y o  female who presents for HYSTERECTOMY LAPAROSCOPIC TOTAL WITH BILATERAL SALPINGO-OOPHERECTOMY (Bilateral Abdomen)  -- VITALS --  LMP 11/14/2022   BP Readings from Last 3 Encounters:   08/15/22 112/74   05/09/22 122/78   12/01/21 128/80     -- LABS --  Results from Last 12 Months   Lab Units 10/27/22  0757   SODIUM mmol/L 137   POTASSIUM mmol/L 4 2   CHLORIDE mmol/L 106   CO2 mmol/L 26   ANION GAP mmol/L 5   BUN mg/dL 15   CREATININE mg/dL 0 96   CALCIUM mg/dL 9 2     Results from Last 12 Months   Lab Units 10/27/22  0757   WBC Thousand/uL 5 99   HEMOGLOBIN g/dL 13 7   HEMATOCRIT % 40 9   PLATELETS Thousands/uL 280     No results for input(s): APTT, INR, PTT in the last 8784 hours      -- ANESTHESIA RISK ASSESSMENT AND SHARED DECISION MAKING --  • Benefits and role of specialized anesthesia care discussed (43 Gee Hunter, PMID [id number]): (1) Specialized anesthesiology support reduces mortality for major surgery by about 100-fold, (2) Anesthesia provides amnesia to the extent appropriate and possible, and (3) Anesthesia works to address analgesia and other symptom control  • The mortality risks associated with anesthesia based on ASA-PS were discussed (PMID [id number]): ASA-PS I 1:250,000; ASA-PS II 1:20,000; ASA-PS III 1:3,500; ASA-PS IV 1:1,800  • The morbidity risks discussed included were but not limited to the following (PMID [id number]):  (1) Neurologic risks: I discussed IntraOp awareness (Risk is ~1:1,000 - 1:14,000, PMID [id number]), Stroke (Risk ~<0 1-2% for most cases, PMID [id number]), and POCD  I also exhorted the patient to avoid driving or performing any other physically or mentally hazardous tasks for 24 hrs after anesthesia  (2) Airway / Pulmonary risks: I discussed the potential for dental or mouth injury, throat pain, critical hypoxia (which can lead to strokes and organ damage), pneumothorax, and any relevant concerns for prolonged intubation   - Airway considerations: Direct laryngoscopy; Type: Cuffed, Oral; Tube Size: 7 mm; Laryngoscope: Mac; Blade Size: 3; Location: Oral; Grade View: 1; Insertion Attempts: 1;   - Pulmonary risks: Simplified ARISCAT score (Major RFs: none); therefore the estimated risk of pulmonary complications is: 9-5= Low, 1 6%  (3) Cardiovascular risks: I discussed periOp MACE risk (see below) as well as risks of bleeding, infection, or injury to adjacent structures related to relevant vascular access  I discussed risks associated with bypass circuits if relevant  (a) EKG:   EKG Not available  No results found for this or any previous visit (from the past 4464 hour(s))  No results found for this or any previous visit  (b) Echo/Relevant Imaging:   TTE not available  (c) Major risks for severe cardiac instability: none      (d) Bandar's RCRI (Major RFs: none); therefore the estimate risk of MACE is: 0= 0 4%        (e) Beta blockers indicated?: no    (4) FEN/GI risks: I discussed the risk of aspiration (Approximately 0 5% risk per IRIS trial) and PONV (10-80% depending on Apfel criteria)    - Patient meets ASA NPO guidelines: yes    (5) Drug reactions, allergic reactions, overdoses and other drug-related risks:  - Anticoagulation status: none  - Patient took the following medications this morning: none  - Personal or family history of anesthesia related complications: no  - Pregnancy Status: Not applicable

## 2022-11-15 NOTE — INTERVAL H&P NOTE
H&P reviewed  After examining the patient I find no changes in the patients condition since the H&P had been written  Reviewed plan of surgery: TLH -bilateral salpingoophorectomy      Vitals:    11/15/22 0713   BP: 130/81   Pulse: 75   Resp: 14   Temp: 98 4 °F (36 9 °C)   SpO2: 98%

## 2022-11-15 NOTE — OP NOTE
OPERATIVE REPORT  PATIENT NAME: Colby Mcgraw    :  1980  MRN: [id number]  Pt Location: [facility] OR ROOM 04    SURGERY DATE: 11/15/2022    Surgeon(s) and Role:     * Jalen Justice MD - Primary     * Bulmaro Woods MD - Assisting    Preop Diagnosis:  Pelvic pain in female [R10 2]    Post-Op Diagnosis Codes:     * Pelvic pain in female [R10 2]    Procedure(s) (LRB):  HYSTERECTOMY LAPAROSCOPIC TOTAL WITH BILATERAL SALPINGO-OOPHERECTOMY (Bilateral)    Specimen(s):  ID Type Source Tests Collected by Time Destination   1 : cervix, uterus  ovaries & tubes Tissue Uterus w/Bilateral Ovaries and Fallopian Tubes TISSUE EXAM Jalen Justice MD 11/15/2022 5171        Estimated Blood Loss:   50 mL    Drains:  Mukherjee catheter removed at completion of procedure    Anesthesia Type:   General endotracheal    Operative Indications:  Pelvic pain in female [R10 2]    Operative Findings:  Normal external female genitalia  Normal cervix  Anteverted uterus sounded to 9 cm  On laparoscopy, normal external uterine contour, normal bilateral fallopian tubes and ovaries  On cystoscopy, intact bladder with no evidence of intravesical suture material   Bilateral ureteral jets visualized  Complications:   None apparent    Procedure and Technique:  The patient was taken to the operating room where she was correctly identified and general anesthesia was administered without difficulty  SCDs were applied to the lower extremities  She was then positioned in dorsal lithotomy using yellowfin stirrups with the arms tucked  All pressure points were padded  The vagina was prepped with chlorhexidine  The abdomen was prepped with Chloraprep and appropriate drying time allowed before sterile drapes were applied  Ancef 2g IV was administered for surgical prophylaxis  Time-out was performed with all parties in agreement  The anterior lip of the cervix was visualized with a weighted speculum and Nesbitt retractor    This was grasped with a single-tooth tenaculum  The uterus sounded to 9cm in mid position  The cervix was serially dilated to accommodate the SHO uterine manipulator tip  0-Vicryl stay suture were applied to the anterior lip of the cervix  The single-tooth tenaculum was removed and the SHO uterine manipulator placed without incident  The Mukherjee catheter was inserted into the bladder with efflux of clear yellow urine noted and the vaginal cuff occluder inflated  Sterile gloves were exchanged and attention turned to the abdomen  Local anesthesia was infiltrated below the umbilicus  Skin incision was made to accommodate the 5 mm trocar  Trocar which was inserted under direct visualization with the laparoscope and pneumoperitoneum established  There was no evidence of injury directly below the trocar insertion site  Pneumoperitoneum was then maintained to a maximum of 15 mmHg  Subsequently, after infiltrating the areas with local, two additional incisions were made in both the right and left lower quadrants  A 10 mm trocar was inserted in the left lower quadrant and a 5 mm trocar inserted in the right lower quadrant  The patient was placed in trendelenburg, and attention was then turned to the left adnexa  The left fallopian tube was grasped and elevated  The left infundibulopelvic ligament was coagulated and cut with the Harmonic  The left round ligament was coagulated and cut with the Harmonic  The anterior leaf of the broad ligament was coagulated and cut and dissected down to the level of the lower uterine segment where the bladder flap was created and pushed down  The left uterine artery was then coagulated and cut  Attention was turned to the right adnexa where the right fallopian tube was grasped and elevated  The right infundibulopelvic ligament was coagulated and cut with the Harmonic  The right round ligament was coagulated and cut  Broad ligament was coagulated and cut down to the level of the uterine arteries  To be the bladder flap anteriorly  The bilateral cardinal ligaments were coagulated  At this point, the uterus was visualized and noted to be blanched  The colpotomy was performed with the monopolar spatula  Once the colpotomy was completed, the attention was then turned back to the perineum where the vaginal cuff occluder was removed, and the uterus, cervix, bilateral ovaries and fallopian tubes were removed vaginally  The vaginal occluder was reinserted to maintain pneumoperitoneum  Sterile gloves were exchanged and attention turned back to the abdomen  The vaginal cuff was closed with running 2-0 Polysorb suture using the Endo Stitch  Suture was cut and the needle removed from the abdomen  The pelvis was irrigated and hemostasis of the vaginal cuff confirmed  The primary surgeon then turned her attention to the perineum  The vaginal occluder and Mukherjee catheter were removed  Cystoscopy was completed and bilateral ureteral jets visualized and there was no suture material noted to be within the bladder  Simultaneously, pneumoperitoneum was allowed to escape from the abdomen and all trocars removed  Skin incisions were closed with 4-0 Monocryl  Hemostasis of the vaginal cuff was confirmed through the vagina and all instruments removed  Patient was cleansed and awakened from general anesthesia and subsequently transferred to the recovery room in stable condition  All needle, sponge, and instrument counts were noted to be correct x 2 at the end of the procedure  Dr Lasha Maki was present for all procedures      Patient Disposition:  PACU     SIGNATURE: Bassam Burrows MD  DATE: November 15, 2022  TIME: 10:38 AM

## 2022-11-15 NOTE — ANESTHESIA POSTPROCEDURE EVALUATION
Post-Op Assessment Note    CV Status:  Stable  Pain Score: 1    Pain management: adequate     Mental Status:  Arousable and sleepy   Hydration Status:  Euvolemic   PONV Controlled:  Controlled   Airway Patency:  Patent  Airway: intubated      Post Op Vitals Reviewed: Yes      Staff: CRNA         No complications documented      BP   104/55   Temp 98 8   Pulse 68   Resp 16   SpO2 97% 4L FM

## 2022-11-16 RX ADMIN — ACETAMINOPHEN 650 MG: 325 TABLET ORAL at 06:55

## 2022-11-16 RX ADMIN — KETOROLAC TROMETHAMINE 15 MG: 30 INJECTION, SOLUTION INTRAMUSCULAR at 11:06

## 2022-11-16 RX ADMIN — KETOROLAC TROMETHAMINE 15 MG: 30 INJECTION, SOLUTION INTRAMUSCULAR at 01:00

## 2022-11-16 RX ADMIN — ACETAMINOPHEN 650 MG: 325 TABLET ORAL at 01:00

## 2022-11-16 RX ADMIN — ACETAMINOPHEN 650 MG: 325 TABLET ORAL at 11:06

## 2022-11-16 RX ADMIN — KETOROLAC TROMETHAMINE 15 MG: 30 INJECTION, SOLUTION INTRAMUSCULAR at 06:55

## 2022-11-16 NOTE — ASSESSMENT & PLAN NOTE
Pain improved overnight with IV toradol and acetominophen  Voiding without difficulty  Plan to discharge home  Outpatient follow up to be arranged

## 2022-11-16 NOTE — PROGRESS NOTES
[address] [facility]  Progress Note - Cleghorn Cast 1980, 40 y o  female MRN: [id number]  Unit/Bed#: -01 Encounter: [id number]  Primary Care Provider: Berry Arango MD   Date and time admitted to hospital: 11/15/2022  6:48 AM    * S/P laparoscopic hysterectomy  Assessment & Plan  Pain improved overnight with IV toradol and acetominophen  Voiding without difficulty  Plan to discharge home  Outpatient follow up to be arranged  Subjective/Objective   Chief Complaint: Pain improved    Subjective: Pain is controlled with current analgesics  Medications being used: toradol, acetominophen  Eating a regular diet without difficulty  Flatus Yes   Bowel movements are not yet occurred  Voiding without difficulty  Ambulating Yes      Objective:     Vitals: Blood pressure 105/72, pulse 101, temperature 98 3 °F (36 8 °C), resp  rate 18, height 5' 4" (1 626 m), weight 82 2 kg (181 lb 3 5 oz), last menstrual period 11/14/2022, SpO2 96 %, not currently breastfeeding  ,Body mass index is 31 11 kg/m²  No intake or output data in the 24 hours ending 11/20/22 0923    Invasive Devices     Peripheral Intravenous Line  Duration           Peripheral IV 11/15/22 Dorsal (posterior); Right Wrist 5 days                Physical Exam  Constitutional:       General: She is not in acute distress  Appearance: She is well-developed  She is not diaphoretic  Neck:      Vascular: No JVD  Cardiovascular:      Rate and Rhythm: Normal rate and regular rhythm  Heart sounds: Normal heart sounds  No murmur heard  No friction rub  No gallop  Pulmonary:      Effort: Pulmonary effort is normal  No respiratory distress  Breath sounds: Normal breath sounds  Abdominal:      General: Bowel sounds are normal  There is no distension  Palpations: Abdomen is soft  Tenderness: There is no abdominal tenderness  There is no guarding or rebound        Comments: Incisions: C/D/I   Musculoskeletal: Cervical back: Neck supple  Right lower leg: No edema  Left lower leg: No edema  Lymphadenopathy:      Cervical: No cervical adenopathy  Neurological:      Mental Status: She is alert and oriented to person, place, and time  Deep Tendon Reflexes: Reflexes are normal and symmetric  Lab, Imaging and other studies: I have personally reviewed pertinent reports

## 2022-11-22 ENCOUNTER — OFFICE VISIT (OUTPATIENT)
Dept: OBGYN CLINIC | Facility: CLINIC | Age: 42
End: 2022-11-22

## 2022-11-22 VITALS
HEIGHT: 64 IN | DIASTOLIC BLOOD PRESSURE: 80 MMHG | WEIGHT: 177.4 LBS | BODY MASS INDEX: 30.29 KG/M2 | SYSTOLIC BLOOD PRESSURE: 120 MMHG

## 2022-11-22 DIAGNOSIS — Z90.710 S/P LAPAROSCOPIC HYSTERECTOMY: ICD-10-CM

## 2022-11-22 DIAGNOSIS — Z09 POSTOPERATIVE EXAMINATION: Primary | ICD-10-CM

## 2022-11-22 NOTE — PROGRESS NOTES
Patient is here for a post op from having a laparoscopic total hysterectomy with bilateral salpingo-oopherectomy on 11/15  Patient states healing is going well  She sees improvement a little bit each day  No vaginal bleeding  Has had lower pelvic pressure since surgery but cramping in abdomen started yesterday

## 2022-11-22 NOTE — PROGRESS NOTES
Assessment/Plan:         Diagnoses and all orders for this visit:    Postoperative examination  Comments:  Healing appropriately  S/P laparoscopic hysterectomy          Subjective:      Patient ID: Kaitlynn Olson is a 40 y o  female  Patient is here for a post op from having a laparoscopic total hysterectomy with bilateral salpingo-oopherectomy on 11/15  Patient states healing is going well  She sees improvement a little bit each day  No vaginal bleeding  Has had lower pelvic pressure since surgery but cramping in abdomen started yesterday  Gynecologic Exam  She reports no genital itching, genital lesions, genital odor, genital rash, pelvic pain, vaginal bleeding or vaginal discharge  Pertinent negatives include no chills, constipation, diarrhea, fever, nausea, sore throat or vomiting  The following portions of the patient's history were reviewed and updated as appropriate:   She  has a past medical history of Abdominal pain, Anesthesia, Exercises 5 to 6 times per week, and Wears contact lenses  She   Patient Active Problem List    Diagnosis Date Noted   • S/P laparoscopic hysterectomy 11/15/2022   • Pelvic pain 10/05/2021   • Closed displaced fracture of neck of right fifth metacarpal bone 10/21/2019   • Contracture of hand joint, 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   • Encounter for gynecological examination 05/08/2018     She  has a past surgical history that includes pr open tx metacarpal fracture single ea bone (Right, 11/5/2019); pr lap,lysis of adhesions (N/A, 11/16/2021); and pr laparoscopy w tot hysterectuterus <=250 gram  w tube/ovary (Bilateral, 11/15/2022)  Her family history includes Diabetes in her family; Heart disease in her family;  No Known Problems in her father, maternal aunt, maternal aunt, maternal grandfather, maternal grandmother, maternal uncle, maternal uncle, mother, paternal aunt, paternal grandfather, paternal grandmother, paternal uncle, paternal uncle, paternal uncle, sister, sister, and son  She  reports that she has never smoked  She has never used smokeless tobacco  She reports current alcohol use of about 3 0 - 4 0 standard drinks per week  She reports that she does not use drugs  Current Outpatient Medications   Medication Sig Dispense Refill   • acetaminophen (TYLENOL) 650 mg CR tablet Take 1 tablet (650 mg total) by mouth every 8 (eight) hours as needed for mild pain 30 tablet 0   • Ascorbic Acid (vitamin C) 100 MG tablet Take 100 mg by mouth daily     • B Complex Vitamins (VITAMIN B COMPLEX PO) Take by mouth     • cholecalciferol (VITAMIN D3) 400 units tablet Take 400 Units by mouth daily     • ibuprofen (MOTRIN) 600 mg tablet Take 1 tablet (600 mg total) by mouth every 6 (six) hours as needed for moderate pain 30 tablet 0   • Multiple Vitamin (multivitamin) tablet Take 1 tablet by mouth daily     • Omega-3 Fatty Acids (Fish Oil) 1000 MG CPDR Take by mouth     • oxyCODONE (ROXICODONE) 5 immediate release tablet Take 1 tablet (5 mg total) by mouth every 4 (four) hours as needed for severe pain for up to 10 days Max Daily Amount: 30 mg 15 tablet 0   • Probiotic Product (PRO-BIOTIC BLEND PO) Take by mouth     • Turmeric 1053 MG TABS Take by mouth       No current facility-administered medications for this visit       Current Outpatient Medications on File Prior to Visit   Medication Sig   • acetaminophen (TYLENOL) 650 mg CR tablet Take 1 tablet (650 mg total) by mouth every 8 (eight) hours as needed for mild pain   • Ascorbic Acid (vitamin C) 100 MG tablet Take 100 mg by mouth daily   • B Complex Vitamins (VITAMIN B COMPLEX PO) Take by mouth   • cholecalciferol (VITAMIN D3) 400 units tablet Take 400 Units by mouth daily   • ibuprofen (MOTRIN) 600 mg tablet Take 1 tablet (600 mg total) by mouth every 6 (six) hours as needed for moderate pain   • Multiple Vitamin (multivitamin) tablet Take 1 tablet by mouth daily   • Omega-3 Fatty Acids (Fish Oil) 1000 MG CPDR Take by mouth   • oxyCODONE (ROXICODONE) 5 immediate release tablet Take 1 tablet (5 mg total) by mouth every 4 (four) hours as needed for severe pain for up to 10 days Max Daily Amount: 30 mg   • Probiotic Product (PRO-BIOTIC BLEND PO) Take by mouth   • Turmeric 1053 MG TABS Take by mouth     No current facility-administered medications on file prior to visit  She has No Known Allergies       Review of Systems   Constitutional: Negative for activity change, appetite change, chills, fatigue and fever  HENT: Negative for rhinorrhea, sneezing and sore throat  Eyes: Negative for visual disturbance  Respiratory: Negative for cough, shortness of breath and wheezing  Cardiovascular: Negative for chest pain, palpitations and leg swelling  Gastrointestinal: Negative for abdominal distention, constipation, diarrhea, nausea and vomiting  Genitourinary: Negative for difficulty urinating, pelvic pain and vaginal discharge  Neurological: Negative for syncope and light-headedness  Objective:      /80 (BP Location: Left arm, Patient Position: Sitting, Cuff Size: Standard)   Ht 5' 4" (1 626 m)   Wt 80 5 kg (177 lb 6 4 oz)   LMP 11/14/2022   BMI 30 45 kg/m²          Physical Exam  Vitals and nursing note reviewed  Abdominal:      General: Bowel sounds are normal  There is no distension  Palpations: Abdomen is soft  Tenderness: There is no abdominal tenderness  There is no guarding or rebound  Comments: Inc c/d/i   Genitourinary:     Vagina: Normal  No vaginal discharge, erythema, tenderness or bleeding

## 2022-12-06 ENCOUNTER — OFFICE VISIT (OUTPATIENT)
Dept: OBGYN CLINIC | Facility: CLINIC | Age: 42
End: 2022-12-06

## 2022-12-06 VITALS — BODY MASS INDEX: 30.28 KG/M2 | WEIGHT: 176.4 LBS | SYSTOLIC BLOOD PRESSURE: 114 MMHG | DIASTOLIC BLOOD PRESSURE: 82 MMHG

## 2022-12-06 DIAGNOSIS — Z09 POSTOPERATIVE EXAMINATION: Primary | ICD-10-CM

## 2022-12-06 DIAGNOSIS — E89.40 PREMATURE SURGICAL MENOPAUSE: ICD-10-CM

## 2022-12-06 RX ORDER — ESTRADIOL 1 MG/1
1 TABLET ORAL DAILY
Qty: 30 TABLET | Refills: 11 | Status: SHIPPED | OUTPATIENT
Start: 2022-12-06

## 2022-12-06 NOTE — PROGRESS NOTES
Assessment/Plan:         Diagnoses and all orders for this visit:    Postoperative examination  Comments:  Feeling much better than last visit  Appetite improved  Pain resolved  Plan follow up Annual  can slowly increase activity    Premature surgical menopause  -     estradiol (ESTRACE) 1 mg tablet; Take 1 tablet (1 mg total) by mouth daily          Subjective:      Patient ID: Tammy Jiménez is a 40 y o  female  Patient s/p Hysterectomy 11/15/22  Follow up from 11/22 appt  Patient states she is doing very well  No issues or complaints to mention  Minimal cramping and swelling when she is on her feet for too long but otherwise, no issues  The following portions of the patient's history were reviewed and updated as appropriate:   She  has a past medical history of Abdominal pain, Anesthesia, Exercises 5 to 6 times per week, and Wears contact lenses  She   Patient Active Problem List    Diagnosis Date Noted   • S/P laparoscopic hysterectomy 11/15/2022   • Pelvic pain 10/05/2021   • Closed displaced fracture of neck of right fifth metacarpal bone 10/21/2019   • Contracture of hand joint, 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   • Encounter for gynecological examination 05/08/2018     She  has a past surgical history that includes pr open tx metacarpal fracture single ea bone (Right, 11/5/2019); pr lap,lysis of adhesions (N/A, 11/16/2021); and pr laparoscopy w tot hysterectuterus <=250 gram  w tube/ovary (Bilateral, 11/15/2022)  Her family history includes Diabetes in her family; Heart disease in her family; No Known Problems in her father, maternal aunt, maternal aunt, maternal grandfather, maternal grandmother, maternal uncle, maternal uncle, mother, paternal aunt, paternal grandfather, paternal grandmother, paternal uncle, paternal uncle, paternal uncle, sister, sister, and son  She  reports that she has never smoked   She has never used smokeless tobacco  She reports current alcohol use of about 3 0 - 4 0 standard drinks per week  She reports that she does not use drugs  Current Outpatient Medications   Medication Sig Dispense Refill   • estradiol (ESTRACE) 1 mg tablet Take 1 tablet (1 mg total) by mouth daily 30 tablet 11   • acetaminophen (TYLENOL) 650 mg CR tablet Take 1 tablet (650 mg total) by mouth every 8 (eight) hours as needed for mild pain 30 tablet 0   • Ascorbic Acid (vitamin C) 100 MG tablet Take 100 mg by mouth daily     • B Complex Vitamins (VITAMIN B COMPLEX PO) Take by mouth     • cholecalciferol (VITAMIN D3) 400 units tablet Take 400 Units by mouth daily     • ibuprofen (MOTRIN) 600 mg tablet Take 1 tablet (600 mg total) by mouth every 6 (six) hours as needed for moderate pain 30 tablet 0   • Multiple Vitamin (multivitamin) tablet Take 1 tablet by mouth daily     • Omega-3 Fatty Acids (Fish Oil) 1000 MG CPDR Take by mouth     • Probiotic Product (PRO-BIOTIC BLEND PO) Take by mouth     • Turmeric 1053 MG TABS Take by mouth       No current facility-administered medications for this visit  Current Outpatient Medications on File Prior to Visit   Medication Sig   • acetaminophen (TYLENOL) 650 mg CR tablet Take 1 tablet (650 mg total) by mouth every 8 (eight) hours as needed for mild pain   • Ascorbic Acid (vitamin C) 100 MG tablet Take 100 mg by mouth daily   • B Complex Vitamins (VITAMIN B COMPLEX PO) Take by mouth   • cholecalciferol (VITAMIN D3) 400 units tablet Take 400 Units by mouth daily   • ibuprofen (MOTRIN) 600 mg tablet Take 1 tablet (600 mg total) by mouth every 6 (six) hours as needed for moderate pain   • Multiple Vitamin (multivitamin) tablet Take 1 tablet by mouth daily   • Omega-3 Fatty Acids (Fish Oil) 1000 MG CPDR Take by mouth   • Probiotic Product (PRO-BIOTIC BLEND PO) Take by mouth   • Turmeric 1053 MG TABS Take by mouth     No current facility-administered medications on file prior to visit  She has No Known Allergies       Review of Systems   Constitutional: Negative for activity change, appetite change, chills, fatigue and fever  HENT: Negative for rhinorrhea, sneezing and sore throat  Eyes: Negative for visual disturbance  Respiratory: Negative for cough, shortness of breath and wheezing  Cardiovascular: Negative for chest pain, palpitations and leg swelling  Gastrointestinal: Negative for abdominal distention, constipation, diarrhea, nausea and vomiting  Genitourinary: Negative for difficulty urinating  Neurological: Negative for syncope and light-headedness  Objective:      /82 (BP Location: Left arm, Patient Position: Sitting, Cuff Size: Standard)   Wt 80 kg (176 lb 6 4 oz)   LMP 11/14/2022   BMI 30 28 kg/m²          Physical Exam  Vitals and nursing note reviewed  Abdominal:      General: Bowel sounds are normal  There is no distension  Palpations: Abdomen is soft  Tenderness: There is no abdominal tenderness  There is no guarding or rebound  Comments: Inc c/d/i   Genitourinary:     Vagina: Normal  No vaginal discharge, erythema, tenderness or bleeding

## 2023-01-17 ENCOUNTER — PATIENT MESSAGE (OUTPATIENT)
Dept: OBGYN CLINIC | Facility: CLINIC | Age: 43
End: 2023-01-17

## 2023-01-17 DIAGNOSIS — R14.0 ABDOMINAL BLOATING: Primary | ICD-10-CM

## 2023-01-17 DIAGNOSIS — R19.8 CHANGE IN BOWEL FUNCTION: ICD-10-CM

## 2023-01-28 ENCOUNTER — OFFICE VISIT (OUTPATIENT)
Dept: URGENT CARE | Facility: MEDICAL CENTER | Age: 43
End: 2023-01-28

## 2023-01-28 VITALS
WEIGHT: 180 LBS | OXYGEN SATURATION: 98 % | DIASTOLIC BLOOD PRESSURE: 95 MMHG | SYSTOLIC BLOOD PRESSURE: 159 MMHG | TEMPERATURE: 97.9 F | HEIGHT: 64 IN | HEART RATE: 91 BPM | BODY MASS INDEX: 30.73 KG/M2 | RESPIRATION RATE: 18 BRPM

## 2023-01-28 DIAGNOSIS — H69.93 DISORDER OF BOTH EUSTACHIAN TUBES: Primary | ICD-10-CM

## 2023-01-28 RX ORDER — IBUPROFEN 800 MG/1
800 TABLET ORAL EVERY 8 HOURS PRN
Qty: 30 TABLET | Refills: 0 | Status: SHIPPED | OUTPATIENT
Start: 2023-01-28 | End: 2023-01-28

## 2023-01-28 RX ORDER — IBUPROFEN 800 MG/1
800 TABLET ORAL EVERY 8 HOURS PRN
Qty: 30 TABLET | Refills: 0 | Status: SHIPPED | OUTPATIENT
Start: 2023-01-28

## 2023-01-28 NOTE — LETTER
January 28, 2023     Patient: Zelalem Muñoz   YOB: 1980   Date of Visit: 1/28/2023       To Whom it May Concern:    Kylah Magallon was seen in my clinic on 1/28/2023  She may return to work on 1/30/2023  If you have any questions or concerns, please don't hesitate to call           Sincerely,          Kendall Boss PA-C        CC: No Recipients

## 2023-01-28 NOTE — PROGRESS NOTES
[facility]        NAME: David Kim is a 40 y o  female  : 1980    MRN: [id number]  DATE: 2023  TIME: 4:10 PM    Assessment and Plan   Disorder of both eustachian tubes [H69 93]  1  Disorder of both eustachian tubes  ibuprofen (MOTRIN) 800 mg tablet    DISCONTINUED: ibuprofen (MOTRIN) 800 mg tablet            Patient Instructions       Follow up with PCP in 3-5 days  Proceed to  ER if symptoms worsen  Chief Complaint     Chief Complaint   Patient presents with   • Earache     Pt having ear pain, headache and feels like her ears are clogged x6 days         History of Present Illness       Earache   There is pain in both ears  This is a new problem  The current episode started in the past 7 days  The problem has been gradually worsening  There has been no fever  Associated symptoms include headaches  Pertinent negatives include no abdominal pain, coughing, diarrhea, ear discharge, rash, rhinorrhea, sore throat or vomiting  Treatments tried: decongestant  The treatment provided no relief  Review of Systems   Review of Systems   Constitutional: Negative for fever  HENT: Positive for ear pain  Negative for ear discharge, rhinorrhea and sore throat  Eyes: Negative for visual disturbance  Respiratory: Negative for cough  Gastrointestinal: Negative for abdominal pain, diarrhea and vomiting  Skin: Negative for rash  Neurological: Positive for headaches  Negative for dizziness and light-headedness           Current Medications       Current Outpatient Medications:   •  acetaminophen (TYLENOL) 650 mg CR tablet, Take 1 tablet (650 mg total) by mouth every 8 (eight) hours as needed for mild pain, Disp: 30 tablet, Rfl: 0  •  Ascorbic Acid (vitamin C) 100 MG tablet, Take 100 mg by mouth daily, Disp: , Rfl:   •  B Complex Vitamins (VITAMIN B COMPLEX PO), Take by mouth, Disp: , Rfl:   •  cholecalciferol (VITAMIN D3) 400 units tablet, Take 400 Units by mouth daily, Disp: , Rfl:   •  estradiol (ESTRACE) 1 mg tablet, Take 1 tablet (1 mg total) by mouth daily, Disp: 30 tablet, Rfl: 11  •  ibuprofen (MOTRIN) 800 mg tablet, Take 1 tablet (800 mg total) by mouth every 8 (eight) hours as needed for mild pain, Disp: 30 tablet, Rfl: 0  •  Multiple Vitamin (multivitamin) tablet, Take 1 tablet by mouth daily, Disp: , Rfl:   •  Omega-3 Fatty Acids (Fish Oil) 1000 MG CPDR, Take by mouth, Disp: , Rfl:   •  Probiotic Product (PRO-BIOTIC BLEND PO), Take by mouth, Disp: , Rfl:   •  Turmeric 1053 MG TABS, Take by mouth, Disp: , Rfl:     Current Allergies     Allergies as of 01/28/2023   • (No Known Allergies)            The following portions of the patient's history were reviewed and updated as appropriate: allergies, current medications, past family history, past medical history, past social history, past surgical history and problem list      Past Medical History:   Diagnosis Date   • Abdominal pain    • Anesthesia     pt reports "after hand surgery the next day had a rash from neck down to shoulder"   • Exercises 5 to 6 times per week    • Wears contact lenses     will wear glasses DOS       Past Surgical History:   Procedure Laterality Date   • NM LAPAROSCOPY W/LYSIS OF ADHESIONS N/A 11/16/2021    Procedure: DIAGNOSTIC LAPAROSCOPY, Left Cyst Wall biopsy;  Surgeon: Sheryl Maki MD;  Location: [facility];  Service: Gynecology   • NM LAPS TOTAL HYSTERECT 250 GM/< W/RMVL TUBE/OVARY Bilateral 11/15/2022    Procedure: HYSTERECTOMY LAPAROSCOPIC TOTAL WITH BILATERAL SALPINGO-OOPHERECTOMY;  Surgeon: Sheryl Maki MD;  Location: [facility];  Service: Gynecology   • NM OPEN [address] 106 SINGLE EA BONE Right 11/5/2019    Procedure: RIGHT HAND CAPSULOTOMY EXTENSOR TENOLYSIS 5TH METACARPAL;  Surgeon: Gianna South MD;  Location: [facility];  Service: Orthopedics       Family History   Problem Relation Age of Onset   • No Known Problems Mother    • No Known Problems Father    • Diabetes Family    • Heart disease Family    • No Known Problems Sister    • No Known Problems Maternal Grandmother    • No Known Problems Maternal Grandfather    • No Known Problems Paternal Grandmother    • No Known Problems Paternal Grandfather    • No Known Problems Son    • No Known Problems Sister    • No Known Problems Maternal Aunt    • No Known Problems Maternal Aunt    • No Known Problems Maternal Uncle    • No Known Problems Maternal Uncle    • No Known Problems Paternal Aunt    • No Known Problems Paternal Uncle    • No Known Problems Paternal Uncle    • No Known Problems Paternal Uncle    • Breast cancer Neg Hx    • Colon cancer Neg Hx    • Ovarian cancer Neg Hx          Medications have been verified  Objective   /95 (BP Location: Right arm)   Pulse 91   Temp 97 9 °F (36 6 °C) (Temporal)   Resp 18   Ht 5' 4" (1 626 m)   Wt 81 6 kg (180 lb)   LMP 11/14/2022   SpO2 98%   BMI 30 90 kg/m²        Physical Exam     Physical Exam  Vitals and nursing note reviewed  Constitutional:       General: She is not in acute distress  Appearance: Normal appearance  She is not ill-appearing  HENT:      Right Ear: Tympanic membrane, ear canal and external ear normal       Left Ear: Tympanic membrane, ear canal and external ear normal       Nose: Congestion and rhinorrhea present  Mouth/Throat:      Mouth: Mucous membranes are moist       Pharynx: No oropharyngeal exudate or posterior oropharyngeal erythema  Comments: Hyperemic with clear postnasal drip  Eyes:      General: No scleral icterus  Extraocular Movements: Extraocular movements intact  Conjunctiva/sclera: Conjunctivae normal       Pupils: Pupils are equal, round, and reactive to light  Cardiovascular:      Rate and Rhythm: Normal rate and regular rhythm  Pulses: Normal pulses  Pulmonary:      Effort: Pulmonary effort is normal  No respiratory distress     Musculoskeletal:         General: Normal range of motion  Cervical back: Normal range of motion and neck supple  No tenderness  Lymphadenopathy:      Cervical: No cervical adenopathy  Neurological:      General: No focal deficit present  Mental Status: She is alert and oriented to person, place, and time  Coordination: Coordination normal       Gait: Gait normal    Psychiatric:         Mood and Affect: Mood normal          Behavior: Behavior normal          Thought Content:  Thought content normal          Judgment: Judgment normal

## 2023-01-28 NOTE — PATIENT INSTRUCTIONS
Eustachian Tube Dysfunction   AMBULATORY CARE:   Eustachian tube dysfunction (ETD)  is a condition that prevents your eustachian tubes from opening properly  It can also cause them to become blocked  Eustachian tubes connect your middle ear to the back of your nose and throat  These tubes open and allow air to flow in and out when you sneeze, swallow, or yawn  Common signs and symptoms include the following:   Fullness or pressure in your ears    Muffled hearing, or a feeling you are hearing under water or have clogged ears    Pain in one or both ears    Ringing in your ears    Popping, crackling, or clicking feeling in your ears    Trouble keeping your balance    Call your doctor or otolaryngologist if:   Your symptoms do not improve or get worse  You have a fever  You have any hearing loss  You have questions or concerns about your condition or care  Treatment:  ETD may get better on its own without any treatment  If it continues, you may need any of the following:  Swallow, yawn, or chew gum  to help open your eustachian tubes  Your healthcare provider may also recommend you blow with your mouth shut and your nostrils pinched closed  Air pressure devices  push air into your nose and eustachian tubes to help relieve air pressure in your ear  Treatment for allergies  such as decongestants, antihistamines, and nasal steroids may improve ETD  They may help decrease swelling of the eustachian tubes  A myringotomy  is surgery to make a hole in your eardrum  The hole relieves pressure and lets fluid drain from your ear  A pressure equalizing (PE) tube may be used to keep the hole open and to help drain fluid  Tuboplasty  is a procedure to widen your eustachian tubes  Follow up with your doctor or otolaryngologist as directed:  Write down your questions so you remember to ask them during your visits    © Copyright [employer] 2022 Information is for End User's use only and may not be sold, redistributed or otherwise used for commercial purposes  All illustrations and images included in CareNotes® are the copyrighted property of [employer]  or Denise Santamaria  The above information is an  only  It is not intended as medical advice for individual conditions or treatments  Talk to your doctor, nurse or pharmacist before following any medical regimen to see if it is safe and effective for you

## 2023-03-28 ENCOUNTER — OFFICE VISIT (OUTPATIENT)
Dept: GASTROENTEROLOGY | Facility: CLINIC | Age: 43
End: 2023-03-28

## 2023-03-28 VITALS
WEIGHT: 177.4 LBS | OXYGEN SATURATION: 99 % | DIASTOLIC BLOOD PRESSURE: 78 MMHG | SYSTOLIC BLOOD PRESSURE: 112 MMHG | HEIGHT: 64 IN | BODY MASS INDEX: 30.29 KG/M2 | HEART RATE: 68 BPM

## 2023-03-28 DIAGNOSIS — R19.7 DIARRHEA, UNSPECIFIED TYPE: Primary | ICD-10-CM

## 2023-03-28 DIAGNOSIS — R10.9 ABDOMINAL PRESSURE: ICD-10-CM

## 2023-03-28 DIAGNOSIS — R14.0 ABDOMINAL BLOATING: ICD-10-CM

## 2023-03-28 DIAGNOSIS — R19.8 CHANGE IN BOWEL FUNCTION: ICD-10-CM

## 2023-03-28 RX ORDER — DICYCLOMINE HCL 20 MG
20 TABLET ORAL EVERY 6 HOURS
Qty: 20 TABLET | Refills: 0 | Status: SHIPPED | OUTPATIENT
Start: 2023-03-28

## 2023-03-28 NOTE — LETTER
March 28, 2023     Jorge Bradley, [address] [place] [address]    Patient: Kathy Gooden   YOB: 1980   Date of Visit: 3/28/2023       Dear Dr Belén To: Thank you for referring Sierra Alvarez to me for evaluation  Below are my notes for this consultation  If you have questions, please do not hesitate to call me  I look forward to following your patient along with you  Sincerely,        Thuy Dunham PA-C        CC: MD Thuy Alejandro PA-C  3/28/2023  9:36 AM  Incomplete  [facility] - Outpatient Consultation  Kathy Gooden 40 y o  female MRN: [id number]  Encounter: [id number]          ASSESSMENT AND PLAN:      1  Abdominal bloating  2  Change in bowel function  3  Diarrhea, unspecified type  4  Abdominal pressure  -Will send patient for laboratories as well as stool cultures   -Patient will continue probiotic therapy and begin a fiber supplementation daily   -Will start dicyclomine as needed   -Patient will begin Gas-X 3 times a day  -Will hold off on any endoscopic evaluation at this time     -We will follow-up in 2 weeks with a phone call   ______________________________________________________________________    HPI:   45-year-old female who presents to the GI clinic today for consultation  Patient reports that back in November 2022 she did undergo a total laparoscopic hysterectomy  She reports about a week after that she was started on estradiol  She reports that soon after starting that medication she began to have diarrhea that was completely liquid  She reports she was at that time going up to 10 times a day  She was having abdominal bloating and cramping  She reports that after 3 weeks of being on the estradiol her symptoms improved  Unfortunately at the end of February patient tested positive for COVID  She reports that soon after that all of her GI symptoms returned    She "started having episodes of diarrhea, abdominal cramping, lower back pain  She reports that everything that she would eat she would have episodes of diarrhea  She reports that since January she had been eating very clean and was doing quite well until this occurred  She denies any rectal bleeding or melena  She is still reporting episodes of nausea and headaches  She does report a lower abdominal pressure  She is not on a fiber supplementation but she is on a probiotic daily  She has never had a colonoscopy in the past   She denies any family history of inflammatory bowel disease  She actually states today that she is starting to feel slightly better  She has been drastically modifying her diet due to symptoms  She has not had any recent stool testing or blood work performed  REVIEW OF SYSTEMS:    CONSTITUTIONAL: Denies any fever, chills, rigors, and weight loss  HEENT: No earache or tinnitus  Denies hearing loss or visual disturbances  CARDIOVASCULAR: No chest pain or palpitations  RESPIRATORY: Denies any cough, hemoptysis, shortness of breath or dyspnea on exertion  GASTROINTESTINAL: As noted in the History of Present Illness  GENITOURINARY: No problems with urination  Denies any hematuria or dysuria  NEUROLOGIC: No dizziness or vertigo, denies headaches  MUSCULOSKELETAL: Denies any muscle or joint pain  SKIN: Denies skin rashes or itching  ENDOCRINE: Denies excessive thirst  Denies intolerance to heat or cold  PSYCHOSOCIAL: Denies depression or anxiety  Denies any recent memory loss         Historical Information   Past Medical History:   Diagnosis Date   • Abdominal pain    • Anesthesia     pt reports \"after hand surgery the next day had a rash from neck down to shoulder\"   • Exercises 5 to 6 times per week    • Wears contact lenses     will wear glasses DOS     Past Surgical History:   Procedure Laterality Date   • MS LAPAROSCOPY W/LYSIS OF ADHESIONS N/A 11/16/2021    Procedure: " DIAGNOSTIC LAPAROSCOPY, Left Cyst Wall biopsy;  Surgeon: Gaetano Mendez MD;  [facility];  Service: Gynecology   • PA LAPS TOTAL HYSTERECT 250 GM/< W/RMVL TUBE/OVARY Bilateral 11/15/2022    Procedure: HYSTERECTOMY LAPAROSCOPIC TOTAL WITH BILATERAL SALPINGO-OOPHERECTOMY;  Surgeon: Gaetano Mendez MD;  [facility];  Service: Gynecology   • PA OPEN Quadra 106 SINGLE EA BONE Right 11/5/2019    Procedure: RIGHT HAND CAPSULOTOMY EXTENSOR TENOLYSIS 5TH METACARPAL;  Surgeon: Ramo Cantu MD;  [facility];  Service: Orthopedics     Social History   Social History     Substance and Sexual Activity   Alcohol Use Yes   • Alcohol/week: 3 0 - 4 0 standard drinks   • Types: 3 - 4 Standard drinks or equivalent per week    Comment: weekly      Social History     Substance and Sexual Activity   Drug Use No     Social History     Tobacco Use   Smoking Status Never   Smokeless Tobacco Never     Family History   Problem Relation Age of Onset   • No Known Problems Mother    • No Known Problems Father    • Diabetes Family    • Heart disease Family    • No Known Problems Sister    • No Known Problems Maternal Grandmother    • No Known Problems Maternal Grandfather    • No Known Problems Paternal Grandmother    • No Known Problems Paternal Grandfather    • No Known Problems Son    • No Known Problems Sister    • No Known Problems Maternal Aunt    • No Known Problems Maternal Aunt    • No Known Problems Maternal Uncle    • No Known Problems Maternal Uncle    • No Known Problems Paternal Aunt    • No Known Problems Paternal Uncle    • No Known Problems Paternal Uncle    • No Known Problems Paternal Uncle    • Breast cancer Neg Hx    • Colon cancer Neg Hx    • Ovarian cancer Neg Hx        Meds/Allergies        Current Outpatient Medications:   •  Ascorbic Acid (vitamin C) 100 MG tablet  •  B Complex Vitamins (VITAMIN B COMPLEX PO)  •  cholecalciferol (VITAMIN D3) 400 units tablet  • "estradiol (ESTRACE) 1 mg tablet  •  Omega-3 Fatty Acids (Fish Oil) 1000 MG CPDR  •  Probiotic Product (PRO-BIOTIC BLEND PO)  •  Turmeric 1053 MG TABS  •  Zinc Sulfate (ZINC 15 PO)    No Known Allergies        Objective      Blood pressure 112/78, pulse 68, height 5' 4\" (1 626 m), weight 80 5 kg (177 lb 6 4 oz), last menstrual period 11/14/2022, SpO2 99 %, not currently breastfeeding  Body mass index is 30 45 kg/m²  PHYSICAL EXAM:      General Appearance:   Alert, cooperative, no distress   HEENT:   Normocephalic, atraumatic, anicteric      Neck:  Supple, symmetrical, trachea midline   Lungs:   Clear to auscultation bilaterally; no rales, rhonchi or wheezing; respirations unlabored    Heart[de-identified]   Regular rate and rhythm; no murmur, rub, or gallop  Abdomen:   Soft, non-tender, non-distended; normal bowel sounds; no masses, no organomegaly    Genitalia:   Deferred    Rectal:   Deferred    Extremities:  No cyanosis, clubbing or edema    Pulses:  2+ and symmetric    Skin:  No jaundice, rashes, or lesions    Lymph nodes:  No palpable cervical lymphadenopathy        Lab Results:   No visits with results within 1 Day(s) from this visit     Latest known visit with results is:   Admission on 11/15/2022, Discharged on 11/16/2022   Component Date Value   • EXT Preg Test, Ur 11/15/2022 Negative    • Control 11/15/2022 Valid    • POC Glucose 11/15/2022 104    • Case Report 11/15/2022                      Value:Surgical Pathology Report                         Case: [id number]                                   Authorizing Provider:  Inez Masterson MD   Collected:           11/15/2022 1762              Ordering Location:     [address] [place] [address] Received:            11/15/2022 [address] [place] [address]                                     Operating Room                                                               Pathologist:           Ana Paula Moore MD                                                         Specimen:    Uterus " w/Bilateral Ovaries and Fallopian Tubes, cervix, uterus  ovaries & tubes           • Final Diagnosis 11/15/2022                      Value: This result contains rich text formatting which cannot be displayed here  • Additional Information 11/15/2022                      Value: This result contains rich text formatting which cannot be displayed here  • Gross Description 11/15/2022                      Value: This result contains rich text formatting which cannot be displayed here  • Clinical Information 11/15/2022                      Value:40year-old female          Radiology Results:   No results found

## 2023-03-28 NOTE — PROGRESS NOTES
[facility] - Outpatient Consultation  Noemí Llanos 40 y o  female MRN: [id number]  Encounter: [id number]          ASSESSMENT AND PLAN:      1  Abdominal bloating  2  Change in bowel function  3  Diarrhea, unspecified type  4  Abdominal pressure  -Will send patient for laboratories as well as stool cultures   -Patient will continue probiotic therapy and begin a fiber supplementation daily   -Will start dicyclomine as needed   -Patient will begin Gas-X 3 times a day  -Will hold off on any endoscopic evaluation at this time     -We will follow-up in 2 weeks with a phone call   ______________________________________________________________________    HPI:   60-year-old female who presents to the GI clinic today for consultation  Patient reports that back in November 2022 she did undergo a total laparoscopic hysterectomy  She reports about a week after that she was started on estradiol  She reports that soon after starting that medication she began to have diarrhea that was completely liquid  She reports she was at that time going up to 10 times a day  She was having abdominal bloating and cramping  She reports that after 3 weeks of being on the estradiol her symptoms improved  Unfortunately at the end of February patient tested positive for COVID  She reports that soon after that all of her GI symptoms returned  She started having episodes of diarrhea, abdominal cramping, lower back pain  She reports that everything that she would eat she would have episodes of diarrhea  She reports that since January she had been eating very clean and was doing quite well until this occurred  She denies any rectal bleeding or melena  She is still reporting episodes of nausea and headaches  She does report a lower abdominal pressure  She is not on a fiber supplementation but she is on a probiotic daily    She has never had a colonoscopy in the past   She denies any family history of "inflammatory bowel disease  She actually states today that she is starting to feel slightly better  She has been drastically modifying her diet due to symptoms  She has not had any recent stool testing or blood work performed  REVIEW OF SYSTEMS:    CONSTITUTIONAL: Denies any fever, chills, rigors, and weight loss  HEENT: No earache or tinnitus  Denies hearing loss or visual disturbances  CARDIOVASCULAR: No chest pain or palpitations  RESPIRATORY: Denies any cough, hemoptysis, shortness of breath or dyspnea on exertion  GASTROINTESTINAL: As noted in the History of Present Illness  GENITOURINARY: No problems with urination  Denies any hematuria or dysuria  NEUROLOGIC: No dizziness or vertigo, denies headaches  MUSCULOSKELETAL: Denies any muscle or joint pain  SKIN: Denies skin rashes or itching  ENDOCRINE: Denies excessive thirst  Denies intolerance to heat or cold  PSYCHOSOCIAL: Denies depression or anxiety  Denies any recent memory loss         Historical Information   Past Medical History:   Diagnosis Date   • Abdominal pain    • Anesthesia     pt reports \"after hand surgery the next day had a rash from neck down to shoulder\"   • Exercises 5 to 6 times per week    • Wears contact lenses     will wear glasses DOS     Past Surgical History:   Procedure Laterality Date   • GA LAPAROSCOPY W/LYSIS OF ADHESIONS N/A 11/16/2021    Procedure: DIAGNOSTIC LAPAROSCOPY, Left Cyst Wall biopsy;  Surgeon: Alicia Young MD;  [facility];  Service: Gynecology   • GA LAPS TOTAL HYSTERECT 250 GM/< W/RMVL TUBE/OVARY Bilateral 11/15/2022    Procedure: HYSTERECTOMY LAPAROSCOPIC TOTAL WITH BILATERAL SALPINGO-OOPHERECTOMY;  Surgeon: Alicia Young MD;  [facility];  Service: Gynecology   • GA OPEN Quadra 106 SINGLE EA BONE Right 11/5/2019    Procedure: RIGHT HAND CAPSULOTOMY EXTENSOR TENOLYSIS 5TH METACARPAL;  Surgeon: Ifeoma Jones MD;  [facility];  Service: " "Orthopedics     Social History   Social History     Substance and Sexual Activity   Alcohol Use Yes   • Alcohol/week: 3 0 - 4 0 standard drinks   • Types: 3 - 4 Standard drinks or equivalent per week    Comment: weekly      Social History     Substance and Sexual Activity   Drug Use No     Social History     Tobacco Use   Smoking Status Never   Smokeless Tobacco Never     Family History   Problem Relation Age of Onset   • No Known Problems Mother    • No Known Problems Father    • Diabetes Family    • Heart disease Family    • No Known Problems Sister    • No Known Problems Maternal Grandmother    • No Known Problems Maternal Grandfather    • No Known Problems Paternal Grandmother    • No Known Problems Paternal Grandfather    • No Known Problems Son    • No Known Problems Sister    • No Known Problems Maternal Aunt    • No Known Problems Maternal Aunt    • No Known Problems Maternal Uncle    • No Known Problems Maternal Uncle    • No Known Problems Paternal Aunt    • No Known Problems Paternal Uncle    • No Known Problems Paternal Uncle    • No Known Problems Paternal Uncle    • Breast cancer Neg Hx    • Colon cancer Neg Hx    • Ovarian cancer Neg Hx        Meds/Allergies       Current Outpatient Medications:   •  Ascorbic Acid (vitamin C) 100 MG tablet  •  B Complex Vitamins (VITAMIN B COMPLEX PO)  •  cholecalciferol (VITAMIN D3) 400 units tablet  •  estradiol (ESTRACE) 1 mg tablet  •  Omega-3 Fatty Acids (Fish Oil) 1000 MG CPDR  •  Probiotic Product (PRO-BIOTIC BLEND PO)  •  Turmeric 1053 MG TABS  •  Zinc Sulfate (ZINC 15 PO)    No Known Allergies        Objective     Blood pressure 112/78, pulse 68, height 5' 4\" (1 626 m), weight 80 5 kg (177 lb 6 4 oz), last menstrual period 11/14/2022, SpO2 99 %, not currently breastfeeding  Body mass index is 30 45 kg/m²          PHYSICAL EXAM:      General Appearance:   Alert, cooperative, no distress   HEENT:   Normocephalic, atraumatic, anicteric      Neck:  Supple, " symmetrical, trachea midline   Lungs:   Clear to auscultation bilaterally; no rales, rhonchi or wheezing; respirations unlabored    Heart[de-identified]   Regular rate and rhythm; no murmur, rub, or gallop  Abdomen:   Soft, non-tender, non-distended; normal bowel sounds; no masses, no organomegaly    Genitalia:   Deferred    Rectal:   Deferred    Extremities:  No cyanosis, clubbing or edema    Pulses:  2+ and symmetric    Skin:  No jaundice, rashes, or lesions    Lymph nodes:  No palpable cervical lymphadenopathy        Lab Results:   No visits with results within 1 Day(s) from this visit  Latest known visit with results is:   Admission on 11/15/2022, Discharged on 11/16/2022   Component Date Value   • EXT Preg Test, Ur 11/15/2022 Negative    • Control 11/15/2022 Valid    • POC Glucose 11/15/2022 104    • Case Report 11/15/2022                      Value:Surgical Pathology Report                         Case: [id number]                                   Authorizing Provider:  Jackie Melendez MD   Collected:           11/15/2022 7527              Ordering Location:     [facility] Received:            11/15/2022 [address] [place] [address]                                     Operating Room                                                               Pathologist:           Wes Forman MD                                                         Specimen:    Uterus w/Bilateral Ovaries and Fallopian Tubes, cervix, uterus  ovaries & tubes           • Final Diagnosis 11/15/2022                      Value: This result contains rich text formatting which cannot be displayed here  • Additional Information 11/15/2022                      Value: This result contains rich text formatting which cannot be displayed here  • Gross Description 11/15/2022                      Value: This result contains rich text formatting which cannot be displayed here     • Clinical Information 11/15/2022                      Value:40year-old female Radiology Results:   No results found    Answers for HPI/ROS submitted by the patient on 3/27/2023  Chronicity: recurrent  Onset: 1 to 4 weeks ago  Onset quality: gradual  Frequency: every several days  Episode duration: 3 Weeks  Progression since onset: waxing and waning  Pain location: suprapubic region  Pain - numeric: 6/10  Pain quality: cramping  Radiates to: back  anorexia: No  arthralgias: No  belching: No  constipation: No  diarrhea: Yes  dysuria: No  fever: No  flatus: No  frequency: No  headaches: Yes  hematochezia: No  hematuria: No  melena: No  myalgias: No  nausea: Yes  weight loss: No  vomiting: Yes  Aggravated by: bowel movement  Relieved by: recumbency  Diagnostic workup: surgery

## 2023-03-30 ENCOUNTER — HOSPITAL ENCOUNTER (OUTPATIENT)
Dept: RADIOLOGY | Facility: MEDICAL CENTER | Age: 43
Discharge: HOME | End: 2023-03-30

## 2023-03-30 ENCOUNTER — APPOINTMENT (OUTPATIENT)
Dept: LAB | Facility: MEDICAL CENTER | Age: 43
End: 2023-03-30

## 2023-03-30 VITALS — HEIGHT: 64 IN | BODY MASS INDEX: 30.22 KG/M2 | WEIGHT: 177 LBS

## 2023-03-30 DIAGNOSIS — R14.0 ABDOMINAL BLOATING: ICD-10-CM

## 2023-03-30 DIAGNOSIS — Z12.31 ENCOUNTER FOR SCREENING MAMMOGRAM FOR MALIGNANT NEOPLASM OF BREAST: ICD-10-CM

## 2023-03-30 DIAGNOSIS — R19.8 CHANGE IN BOWEL FUNCTION: ICD-10-CM

## 2023-03-30 LAB
ALBUMIN SERPL BCP-MCNC: 3.8 G/DL (ref 3.5–5)
ALP SERPL-CCNC: 49 U/L (ref 46–116)
ALT SERPL W P-5'-P-CCNC: 25 U/L (ref 12–78)
ANION GAP SERPL CALCULATED.3IONS-SCNC: 2 MMOL/L (ref 4–13)
AST SERPL W P-5'-P-CCNC: 18 U/L (ref 5–45)
BASOPHILS # BLD AUTO: 0.06 THOUSANDS/ÂΜL (ref 0–0.1)
BASOPHILS NFR BLD AUTO: 1 % (ref 0–1)
BILIRUB SERPL-MCNC: 0.53 MG/DL (ref 0.2–1)
BUN SERPL-MCNC: 14 MG/DL (ref 5–25)
CALCIUM SERPL-MCNC: 9.5 MG/DL (ref 8.3–10.1)
CHLORIDE SERPL-SCNC: 102 MMOL/L (ref 96–108)
CO2 SERPL-SCNC: 28 MMOL/L (ref 21–32)
CREAT SERPL-MCNC: 0.89 MG/DL (ref 0.6–1.3)
CRP SERPL QL: 7.8 MG/L
EOSINOPHIL # BLD AUTO: 0.34 THOUSAND/ÂΜL (ref 0–0.61)
EOSINOPHIL NFR BLD AUTO: 7 % (ref 0–6)
ERYTHROCYTE [DISTWIDTH] IN BLOOD BY AUTOMATED COUNT: 12.4 % (ref 11.6–15.1)
ERYTHROCYTE [SEDIMENTATION RATE] IN BLOOD: 14 MM/HOUR (ref 0–19)
GFR SERPL CREATININE-BSD FRML MDRD: 80 ML/MIN/1.73SQ M
GLUCOSE P FAST SERPL-MCNC: 86 MG/DL (ref 65–99)
HCT VFR BLD AUTO: 39.2 % (ref 34.8–46.1)
HGB BLD-MCNC: 13.5 G/DL (ref 11.5–15.4)
IMM GRANULOCYTES # BLD AUTO: 0.01 THOUSAND/UL (ref 0–0.2)
IMM GRANULOCYTES NFR BLD AUTO: 0 % (ref 0–2)
LYMPHOCYTES # BLD AUTO: 2.31 THOUSANDS/ÂΜL (ref 0.6–4.47)
LYMPHOCYTES NFR BLD AUTO: 47 % (ref 14–44)
MCH RBC QN AUTO: 31.7 PG (ref 26.8–34.3)
MCHC RBC AUTO-ENTMCNC: 34.4 G/DL (ref 31.4–37.4)
MCV RBC AUTO: 92 FL (ref 82–98)
MONOCYTES # BLD AUTO: 0.31 THOUSAND/ÂΜL (ref 0.17–1.22)
MONOCYTES NFR BLD AUTO: 6 % (ref 4–12)
NEUTROPHILS # BLD AUTO: 1.96 THOUSANDS/ÂΜL (ref 1.85–7.62)
NEUTS SEG NFR BLD AUTO: 39 % (ref 43–75)
NRBC BLD AUTO-RTO: 0 /100 WBCS
PLATELET # BLD AUTO: 298 THOUSANDS/UL (ref 149–390)
PMV BLD AUTO: 10.5 FL (ref 8.9–12.7)
POTASSIUM SERPL-SCNC: 4.1 MMOL/L (ref 3.5–5.3)
PROT SERPL-MCNC: 7.4 G/DL (ref 6.4–8.4)
RBC # BLD AUTO: 4.26 MILLION/UL (ref 3.81–5.12)
SODIUM SERPL-SCNC: 132 MMOL/L (ref 135–147)
WBC # BLD AUTO: 4.99 THOUSAND/UL (ref 4.31–10.16)

## 2023-03-31 LAB
ENDOMYSIUM IGA SER QL: NEGATIVE
GLIADIN PEPTIDE IGA SER-ACNC: 7 UNITS (ref 0–19)
GLIADIN PEPTIDE IGG SER-ACNC: 2 UNITS (ref 0–19)
IGA SERPL-MCNC: 176 MG/DL (ref 87–352)
TTG IGA SER-ACNC: <2 U/ML (ref 0–3)
TTG IGG SER-ACNC: <2 U/ML (ref 0–5)

## 2023-04-10 DIAGNOSIS — R14.0 ABDOMINAL BLOATING: Primary | ICD-10-CM

## 2023-04-10 DIAGNOSIS — R10.9 ABDOMINAL PRESSURE: ICD-10-CM

## 2023-04-24 ENCOUNTER — TELEPHONE (OUTPATIENT)
Dept: GASTROENTEROLOGY | Facility: CLINIC | Age: 43
End: 2023-04-24

## 2023-04-24 NOTE — TELEPHONE ENCOUNTER
----- Message from Rosa Maria Darnell PA-C sent at 4/24/2023  8:54 AM EDT -----  Please inform patient that her ultrasound is normal   Thank you

## 2023-05-03 ENCOUNTER — OFFICE VISIT (OUTPATIENT)
Dept: URGENT CARE | Facility: MEDICAL CENTER | Age: 43
End: 2023-05-03

## 2023-05-03 VITALS
OXYGEN SATURATION: 99 % | HEIGHT: 64 IN | WEIGHT: 177 LBS | RESPIRATION RATE: 18 BRPM | HEART RATE: 80 BPM | BODY MASS INDEX: 30.22 KG/M2 | DIASTOLIC BLOOD PRESSURE: 76 MMHG | TEMPERATURE: 98.9 F | SYSTOLIC BLOOD PRESSURE: 116 MMHG

## 2023-05-03 DIAGNOSIS — J02.9 ACUTE PHARYNGITIS, UNSPECIFIED ETIOLOGY: Primary | ICD-10-CM

## 2023-05-03 LAB — S PYO AG THROAT QL: NEGATIVE

## 2023-05-03 NOTE — PATIENT INSTRUCTIONS
Pharyngitis  Salt water gargles  Follow up with PCP in 3-5 days  Proceed to  ER if symptoms worsen  [id number]

## 2023-05-03 NOTE — PROGRESS NOTES
[address] [facility]        NAME: Giselle Huizar is a 40 y o  female  : 1980    MRN: [id number]  DATE: May 3, 2023  TIME: 10:40 AM    Assessment and Plan   Acute pharyngitis, unspecified etiology [J02 9]  1  Acute pharyngitis, unspecified etiology  POCT rapid strepA            Patient Instructions     Pharyngitis  Salt water gargles  Follow up with PCP in 3-5 days  Proceed to  ER if symptoms worsen  Chief Complaint     Chief Complaint   Patient presents with    Sore Throat     Sore throat x3 days; COVID at home tested negative Monday and Tuesday          History of Present Illness       43-year-old female who presents complaining of sore throat and pain on swallowing x2 days  Patient denies cough, congestion  States she did a COVID-19 test at home which was negative        Review of Systems   Review of Systems      Current Medications       Current Outpatient Medications:     Ascorbic Acid (vitamin C) 100 MG tablet, Take 100 mg by mouth daily, Disp: , Rfl:     B Complex Vitamins (VITAMIN B COMPLEX PO), Take by mouth, Disp: , Rfl:     cholecalciferol (VITAMIN D3) 400 units tablet, Take 400 Units by mouth daily, Disp: , Rfl:     dicyclomine (BENTYL) 20 mg tablet, Take 1 tablet (20 mg total) by mouth every 6 (six) hours, Disp: 20 tablet, Rfl: 0    estradiol (ESTRACE) 1 mg tablet, Take 1 tablet (1 mg total) by mouth daily, Disp: 30 tablet, Rfl: 11    Omega-3 Fatty Acids (Fish Oil) 1000 MG CPDR, Take by mouth, Disp: , Rfl:     Probiotic Product (PRO-BIOTIC BLEND PO), Take by mouth, Disp: , Rfl:     Turmeric 1053 MG TABS, Take by mouth, Disp: , Rfl:     Zinc Sulfate (ZINC 15 PO), Take by mouth, Disp: , Rfl:     Current Allergies     Allergies as of 2023    (No Known Allergies)            The following portions of the patient's history were reviewed and updated as appropriate: allergies, current medications, past family history, past medical history, past social history, past surgical "history and problem list      Past Medical History:   Diagnosis Date    Abdominal pain     Anesthesia     pt reports \"after hand surgery the next day had a rash from neck down to shoulder\"    Exercises 5 to 6 times per week     Wears contact lenses     will wear glasses DOS       Past Surgical History:   Procedure Laterality Date    ME LAPAROSCOPY W/LYSIS OF ADHESIONS N/A 11/16/2021    Procedure: DIAGNOSTIC LAPAROSCOPY, Left Cyst Wall biopsy;  Surgeon: Cora Estrella MD;  [facility];  Service: Gynecology    ME LAPS TOTAL HYSTERECT 250 GM/< W/RMVL TUBE/OVARY Bilateral 11/15/2022    Procedure: HYSTERECTOMY LAPAROSCOPIC TOTAL WITH BILATERAL SALPINGO-OOPHERECTOMY;  Surgeon: Cora Estrella MD;  [facility];  Service: Gynecology    ME OPEN Quadra 106 SINGLE EA BONE Right 11/5/2019    Procedure: RIGHT HAND CAPSULOTOMY EXTENSOR TENOLYSIS 5TH METACARPAL;  Surgeon: Saima Bush MD;  [facility];  Service: Orthopedics       Family History   Problem Relation Age of Onset    No Known Problems Mother     No Known Problems Father     Diabetes Family     Heart disease Family     No Known Problems Sister     No Known Problems Maternal Grandmother     No Known Problems Maternal Grandfather     No Known Problems Paternal Grandmother     No Known Problems Paternal Grandfather     No Known Problems Son     No Known Problems Sister     No Known Problems Maternal Aunt     No Known Problems Maternal Aunt     No Known Problems Maternal Uncle     No Known Problems Maternal Uncle     No Known Problems Paternal Aunt     No Known Problems Paternal Uncle     No Known Problems Paternal Uncle     No Known Problems Paternal Uncle     Breast cancer Neg Hx     Colon cancer Neg Hx     Ovarian cancer Neg Hx          Medications have been verified          Objective   /76   Pulse 80   Temp 98 9 °F (37 2 °C) (Temporal)   Resp 18   Ht 5' 4\" (1 626 m)   Wt 80 3 " kg (177 lb)   LMP 11/14/2022   SpO2 99%   BMI 30 38 kg/m²        Physical Exam     Physical Exam

## 2023-05-05 LAB — BACTERIA THROAT CULT: NORMAL

## 2023-08-22 ENCOUNTER — ANNUAL EXAM (OUTPATIENT)
Dept: OBGYN CLINIC | Facility: CLINIC | Age: 43
End: 2023-08-22
Payer: COMMERCIAL

## 2023-08-22 VITALS
DIASTOLIC BLOOD PRESSURE: 64 MMHG | WEIGHT: 180 LBS | HEIGHT: 64 IN | BODY MASS INDEX: 30.73 KG/M2 | SYSTOLIC BLOOD PRESSURE: 116 MMHG

## 2023-08-22 DIAGNOSIS — Z12.31 ENCOUNTER FOR SCREENING MAMMOGRAM FOR MALIGNANT NEOPLASM OF BREAST: ICD-10-CM

## 2023-08-22 DIAGNOSIS — Z01.419 ENCOUNTER FOR ANNUAL ROUTINE GYNECOLOGICAL EXAMINATION: Primary | ICD-10-CM

## 2023-08-22 DIAGNOSIS — G89.29 OTHER CHRONIC PAIN: ICD-10-CM

## 2023-08-22 DIAGNOSIS — Z01.419 ENCOUNTER FOR GYNECOLOGICAL EXAMINATION: ICD-10-CM

## 2023-08-22 PROCEDURE — S0612 ANNUAL GYNECOLOGICAL EXAMINA: HCPCS | Performed by: OBSTETRICS & GYNECOLOGY

## 2023-08-22 NOTE — ASSESSMENT & PLAN NOTE
Pap/HPV not idnicated  Mammogram ordered    Chronic pelvic pain: has evaluation with GI, no relief from pain. Severe episodes - Lower abdomen, reduced abdominal wall strength/ proximal leg weakness despite exercise. Weight gain despite dietary adjustments and exercise    ERT - plan to continue estrogen treatment. Hot flashes improved. One option would be to add provera will hold on this for now.

## 2023-08-22 NOTE — PROGRESS NOTES
Assessment/Plan:    Encounter for gynecological examination  Pap/HPV not idnicated  Mammogram ordered    Chronic pelvic pain: has evaluation with GI, no relief from pain. Severe episodes - Lower abdomen, reduced abdominal wall strength/ proximal leg weakness despite exercise. Weight gain despite dietary adjustments and exercise    ERT - plan to continue estrogen treatment. Hot flashes improved. One option would be to add provera will hold on this for now. Diagnoses and all orders for this visit:    Encounter for annual routine gynecological examination    Encounter for screening mammogram for malignant neoplasm of breast  -     Mammo screening bilateral w 3d & cad; Future    Other chronic pain  -     Ambulatory referral to [facility]; Future    Encounter for gynecological examination          Subjective:      Patient ID: Richmond Arredondo is a 40 y.o. female. Patient presents for a routine annual visit  Hysterectomy  Mammogram- 3/30/23     Non smoker  Social drinker  Currently sexually active  No family history of uterine, ovarian, cervical or breast cancer    Pt experiencing some bloating and cramping since hysterectomy. Also reports frequent headaches with nausea and dizziness. Would like to discuss. Pain severe will occur at two week intervals. Has GI evaluation underway - did have episode of loose stool s/p covid for about 2 months    Also, lower abdominal weakness despite exercise. Had been up to heavy lifting/cross fit prior to surgery has been unable to return to this type of exercise due to pain when lifting. Gynecologic Exam  She complains of pelvic pain. She reports no genital itching, genital lesions, genital odor, genital rash, vaginal bleeding or vaginal discharge. The pain is moderate. The problem affects both sides. Associated symptoms include constipation, diarrhea and headaches. Pertinent negatives include no chills, fever, nausea, sore throat or vomiting. The following portions of the patient's history were reviewed and updated as appropriate:   She  has a past medical history of Abdominal pain, Anesthesia, Exercises 5 to 6 times per week, and Wears contact lenses. 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 Her family history includes Diabetes in her family; Heart disease in her family; No Known Problems in her father, maternal aunt, maternal aunt, maternal grandfather, maternal grandmother, maternal uncle, maternal uncle, mother, paternal aunt, paternal grandfather, paternal grandmother, paternal uncle, paternal uncle, paternal uncle, sister, sister, and son. She  reports that she has never smoked. She has never used smokeless tobacco. She reports current alcohol use of about 3.0 - 4.0 standard drinks of alcohol per week. She reports that she does not use drugs.   Current Outpatient Medications   Medication Sig Dispense Refill   • Ascorbic Acid (vitamin C) 100 MG tablet Take 100 mg by mouth daily     • B Complex Vitamins (VITAMIN B COMPLEX PO) Take by mouth     • cholecalciferol (VITAMIN D3) 400 units tablet Take 400 Units by mouth daily     • estradiol (ESTRACE) 1 mg tablet Take 1 tablet (1 mg total) by mouth daily 30 tablet 11   • Omega-3 Fatty Acids (Fish Oil) 1000 MG CPDR Take by mouth     • Probiotic Product (PRO-BIOTIC BLEND PO) Take by mouth     • Turmeric 1053 MG TABS Take by mouth     • Zinc Sulfate (ZINC 15 PO) Take by mouth     • dicyclomine (BENTYL) 20 mg tablet Take 1 tablet (20 mg total) by mouth every 6 (six) hours 20 tablet 0     No current facility-administered medications for this visit. Current Outpatient Medications on File Prior to Visit   Medication Sig   • Ascorbic Acid (vitamin C) 100 MG tablet Take 100 mg by mouth daily   • B Complex Vitamins (VITAMIN B COMPLEX PO) Take by mouth   • cholecalciferol (VITAMIN D3) 400 units tablet Take 400 Units by mouth daily   • estradiol (ESTRACE) 1 mg tablet Take 1 tablet (1 mg total) by mouth daily   • Omega-3 Fatty Acids (Fish Oil) 1000 MG CPDR Take by mouth   • Probiotic Product (PRO-BIOTIC BLEND PO) Take by mouth   • Turmeric 1053 MG TABS Take by mouth   • Zinc Sulfate (ZINC 15 PO) Take by mouth   • dicyclomine (BENTYL) 20 mg tablet Take 1 tablet (20 mg total) by mouth every 6 (six) hours     No current facility-administered medications on file prior to visit. She has No Known Allergies. .    Review of Systems   Constitutional: Negative for activity change, appetite change, chills, fatigue and fever. HENT: Negative for rhinorrhea, sneezing and sore throat. Eyes: Negative for visual disturbance. Respiratory: Negative for cough, shortness of breath and wheezing. Cardiovascular: Negative for chest pain, palpitations and leg swelling. Gastrointestinal: Positive for constipation and diarrhea. Negative for abdominal distention, nausea and vomiting. Genitourinary: Positive for pelvic pain. Negative for difficulty urinating and vaginal discharge. Neurological: Positive for headaches. Negative for syncope and light-headedness. Objective:      /64 (BP Location: Left arm, Patient Position: Sitting, Cuff Size: Standard)   Ht 5' 4" (1.626 m)   Wt 81.6 kg (180 lb)   LMP 11/14/2022   BMI 30.90 kg/m²          Physical Exam  Constitutional:       General: She is not in acute distress. Appearance: Normal appearance. She is well-developed. She is not diaphoretic. Chest:   Breasts:     Breasts are symmetrical.      Right: No inverted nipple, mass, nipple discharge, skin change or tenderness. Left: No inverted nipple, mass, nipple discharge, skin change or tenderness. Abdominal:      Palpations: Abdomen is soft. Abdomen is not rigid. Tenderness: There is no abdominal tenderness. There is no guarding or rebound. Hernia: There is no hernia in the left inguinal area. Comments: Tender bilateral lower quadrant, proximity to ASIS  No masses felt, no hernia appreciated   Genitourinary:     Labia:         Right: No rash, tenderness, lesion or injury. Left: No rash, tenderness, lesion or injury. Vagina: No vaginal discharge or bleeding. Adnexa:         Right: No mass, tenderness or fullness. Left: No mass, tenderness or fullness. Comments: Urethral meatus: no lesions, non tender  Urethra: non tender      Skin:     General: Skin is warm and dry. Coloration: Skin is not pale. Findings: No erythema or rash.

## 2023-09-13 ENCOUNTER — PREP FOR PROCEDURE (OUTPATIENT)
Dept: GASTROENTEROLOGY | Facility: CLINIC | Age: 43
End: 2023-09-13

## 2023-09-13 DIAGNOSIS — R10.9 ABDOMINAL PRESSURE: ICD-10-CM

## 2023-09-13 DIAGNOSIS — R19.8 CHANGE IN BOWEL FUNCTION: Primary | ICD-10-CM

## 2023-09-13 DIAGNOSIS — R19.7 DIARRHEA, UNSPECIFIED TYPE: ICD-10-CM

## 2023-10-17 ENCOUNTER — HOSPITAL ENCOUNTER (OUTPATIENT)
Dept: GASTROENTEROLOGY | Facility: HOSPITAL | Age: 43
Setting detail: OUTPATIENT SURGERY
Discharge: HOME | End: 2023-10-17
Attending: INTERNAL MEDICINE
Payer: COMMERCIAL

## 2023-10-17 ENCOUNTER — ANESTHESIA (OUTPATIENT)
Dept: GASTROENTEROLOGY | Facility: HOSPITAL | Age: 43
End: 2023-10-17

## 2023-10-17 ENCOUNTER — ANESTHESIA EVENT (OUTPATIENT)
Dept: GASTROENTEROLOGY | Facility: HOSPITAL | Age: 43
End: 2023-10-17

## 2023-10-17 VITALS
OXYGEN SATURATION: 98 % | TEMPERATURE: 97.6 F | SYSTOLIC BLOOD PRESSURE: 124 MMHG | BODY MASS INDEX: 30.37 KG/M2 | DIASTOLIC BLOOD PRESSURE: 69 MMHG | HEART RATE: 63 BPM | RESPIRATION RATE: 16 BRPM | HEIGHT: 64 IN | WEIGHT: 177.91 LBS

## 2023-10-17 DIAGNOSIS — R19.8 CHANGE IN BOWEL FUNCTION: ICD-10-CM

## 2023-10-17 DIAGNOSIS — R19.7 DIARRHEA, UNSPECIFIED TYPE: ICD-10-CM

## 2023-10-17 DIAGNOSIS — R10.9 ABDOMINAL PRESSURE: ICD-10-CM

## 2023-10-17 PROCEDURE — 88305 TISSUE EXAM BY PATHOLOGIST: CPT | Performed by: SPECIALIST

## 2023-10-17 RX ORDER — SODIUM CHLORIDE, SODIUM LACTATE, POTASSIUM CHLORIDE, CALCIUM CHLORIDE 600; 310; 30; 20 MG/100ML; MG/100ML; MG/100ML; MG/100ML
125 INJECTION, SOLUTION INTRAVENOUS CONTINUOUS
Status: CANCELLED | OUTPATIENT
Start: 2023-10-17

## 2023-10-17 RX ORDER — PROPOFOL 10 MG/ML
INJECTION, EMULSION INTRAVENOUS AS NEEDED
Status: DISCONTINUED | OUTPATIENT
Start: 2023-10-17 | End: 2023-10-17

## 2023-10-17 RX ORDER — LIDOCAINE HYDROCHLORIDE 10 MG/ML
INJECTION, SOLUTION EPIDURAL; INFILTRATION; INTRACAUDAL; PERINEURAL AS NEEDED
Status: DISCONTINUED | OUTPATIENT
Start: 2023-10-17 | End: 2023-10-17

## 2023-10-17 RX ORDER — SODIUM CHLORIDE, SODIUM LACTATE, POTASSIUM CHLORIDE, CALCIUM CHLORIDE 600; 310; 30; 20 MG/100ML; MG/100ML; MG/100ML; MG/100ML
INJECTION, SOLUTION INTRAVENOUS CONTINUOUS PRN
Status: DISCONTINUED | OUTPATIENT
Start: 2023-10-17 | End: 2023-10-17

## 2023-10-17 RX ADMIN — PROPOFOL 50 MG: 10 INJECTION, EMULSION INTRAVENOUS at 13:38

## 2023-10-17 RX ADMIN — PROPOFOL 150 MG: 10 INJECTION, EMULSION INTRAVENOUS at 13:34

## 2023-10-17 RX ADMIN — PROPOFOL 50 MG: 10 INJECTION, EMULSION INTRAVENOUS at 13:35

## 2023-10-17 RX ADMIN — LIDOCAINE HYDROCHLORIDE 50 MG: 10 INJECTION, SOLUTION EPIDURAL; INFILTRATION; INTRACAUDAL at 13:34

## 2023-10-17 RX ADMIN — PROPOFOL 50 MG: 10 INJECTION, EMULSION INTRAVENOUS at 13:41

## 2023-10-17 RX ADMIN — SODIUM CHLORIDE, SODIUM LACTATE, POTASSIUM CHLORIDE, AND CALCIUM CHLORIDE: .6; .31; .03; .02 INJECTION, SOLUTION INTRAVENOUS at 13:32

## 2023-10-17 RX ADMIN — PROPOFOL 30 MG: 10 INJECTION, EMULSION INTRAVENOUS at 13:44

## 2023-10-17 NOTE — ANESTHESIA PREPROCEDURE EVALUATION
Procedure:  COLONOSCOPY    Relevant Problems   ANESTHESIA (within normal limits)      CARDIO (within normal limits)      ENDO (within normal limits)      GI/HEPATIC  Confirmed NPO appropriate  S/p bowel prep      /RENAL (within normal limits)      HEMATOLOGY (within normal limits)      MUSCULOSKELETAL (within normal limits)      NEURO/PSYCH (within normal limits)      PULMONARY (within normal limits)   (-) Smoking   (-) URI (upper respiratory infection)      Other   (+) BMI 30.0-30.9,adult   (+) S/P laparoscopic hysterectomy        Physical Exam    Airway    Mallampati score: II  TM Distance: >3 FB  Neck ROM: full     Dental   No notable dental hx     Cardiovascular  Cardiovascular exam normal    Pulmonary  Pulmonary exam normal     Other Findings        Anesthesia Plan  ASA Score- 2     Anesthesia Type- IV sedation with anesthesia with ASA Monitors. Additional Monitors:     Airway Plan:     Comment: I discussed the risks and benefits of IV sedation anesthesia including the possibility of the need to convert to general anesthesia and the potential risk of awareness. The patient was given the opportunity to ask questions, which were answered. .       Plan Factors-Exercise tolerance (METS): >4 METS. Chart reviewed. Patient is not a current smoker. Induction- intravenous. Postoperative Plan-     Informed Consent- Anesthetic plan and risks discussed with patient and spouse. I personally reviewed this patient with the CRNA. Discussed and agreed on the Anesthesia Plan with the CRNA. Nile Kilgore

## 2023-10-17 NOTE — ANESTHESIA POSTPROCEDURE EVALUATION
Post-Op Assessment Note    CV Status:  Stable  Pain Score: 0    Pain management: adequate     Mental Status:  Alert and awake   Hydration Status:  Euvolemic   PONV Controlled:  Controlled   Airway Patency:  Patent and adequate      Post Op Vitals Reviewed: Yes      Staff: CRNA         No notable events documented.     BP   123/75   Temp      Pulse  70   Resp   20   SpO2   98

## 2023-10-17 NOTE — H&P
History and Physical - [facility]  Belinda Jackson 40 y.o. female MRN: [id number]      HPI: Belinda Jackson is a 40y.o. year old female who presents for evaluation of abdominal pain, change bowel habits, diarrhea      REVIEW OF SYSTEMS: Per the HPI, and otherwise unremarkable.     Historical Information   Past Medical History:   Diagnosis Date    Abdominal pain     Anesthesia     pt reports "after hand surgery the next day had a rash from neck down to shoulder"    Exercises 5 to 6 times per week     Wears contact lenses     will wear glasses DOS     Past Surgical History:   Procedure Laterality Date    NM LAPAROSCOPY W/LYSIS OF ADHESIONS N/A 11/16/2021    Procedure: DIAGNOSTIC LAPAROSCOPY, Left Cyst Wall biopsy;  Surgeon: Elisabeth Wellington MD;  Location: [facility];  Service: Gynecology    NM LAPS TOTAL HYSTERECT 250 GM/< W/RMVL TUBE/OVARY Bilateral 11/15/2022    Procedure: HYSTERECTOMY LAPAROSCOPIC TOTAL WITH BILATERAL SALPINGO-OOPHERECTOMY;  Surgeon: Elisabeth Wellington MD;  Location: [facility];  Service: Gynecology    NM OPEN [address] [facility],  [address] EA BONE Right 11/5/2019    Procedure: RIGHT HAND CAPSULOTOMY EXTENSOR TENOLYSIS 5TH METACARPAL;  Surgeon: Skyler Ceron MD;  Location: [facility];  Service: Orthopedics     Social History   Social History     Substance and Sexual Activity   Alcohol Use Yes    Alcohol/week: 3.0 - 4.0 standard drinks of alcohol    Types: 3 - 4 Standard drinks or equivalent per week    Comment: weekly      Social History     Substance and Sexual Activity   Drug Use No     Social History     Tobacco Use   Smoking Status Never   Smokeless Tobacco Never     Family History   Problem Relation Age of Onset    No Known Problems Mother     No Known Problems Father     Diabetes Family     Heart disease Family     No Known Problems Sister     No Known Problems Maternal Grandmother     No Known Problems Maternal Grandfather     No Known Problems Paternal Grandmother     No Known Problems Paternal Grandfather     No Known Problems Son     No Known Problems Sister     No Known Problems Maternal Aunt     No Known Problems Maternal Aunt     No Known Problems Maternal Uncle     No Known Problems Maternal Uncle     No Known Problems Paternal Aunt     No Known Problems Paternal Uncle     No Known Problems Paternal Uncle     No Known Problems Paternal Uncle     Breast cancer Neg Hx     Colon cancer Neg Hx     Ovarian cancer Neg Hx        Meds/Allergies     (Not in a hospital admission)      No Known Allergies    Objective     Blood pressure 123/80, pulse 67, temperature 97.7 °F (36.5 °C), temperature source Temporal, resp. rate 18, height 5' 4" (1.626 m), weight 80.7 kg (177 lb 14.6 oz), last menstrual period 11/14/2022, SpO2 97 %, not currently breastfeeding. PHYSICAL EXAM    Gen: NAD  CV: RRR  CHEST: Clear  ABD: soft, NT/ND  EXT: no edema      ASSESSMENT/PLAN:  This is a 40y.o. year old female here for colonoscopy, and she is stable and optimized for her procedure.

## 2023-10-18 DIAGNOSIS — E89.40 PREMATURE SURGICAL MENOPAUSE: ICD-10-CM

## 2023-10-18 RX ORDER — ESTRADIOL 1 MG/1
1 TABLET ORAL DAILY
Qty: 90 TABLET | Refills: 3 | Status: SHIPPED | OUTPATIENT
Start: 2023-10-18

## 2023-10-19 PROCEDURE — 88305 TISSUE EXAM BY PATHOLOGIST: CPT | Performed by: SPECIALIST

## 2023-10-21 PROBLEM — Z01.419 ENCOUNTER FOR GYNECOLOGICAL EXAMINATION: Status: RESOLVED | Noted: 2018-05-08 | Resolved: 2023-10-21

## 2023-10-30 DIAGNOSIS — K52.832 LYMPHOCYTIC COLITIS: Primary | ICD-10-CM

## 2023-10-30 RX ORDER — BUDESONIDE 3 MG/1
3 CAPSULE, COATED PELLETS ORAL DAILY
Qty: 90 CAPSULE | Refills: 0 | Status: SHIPPED | OUTPATIENT
Start: 2023-10-30 | End: 2023-11-30

## 2023-11-07 ENCOUNTER — TELEPHONE (OUTPATIENT)
Dept: GASTROENTEROLOGY | Facility: CLINIC | Age: 43
End: 2023-11-07

## 2023-11-07 ENCOUNTER — TELEPHONE (OUTPATIENT)
Age: 43
End: 2023-11-07

## 2023-11-07 NOTE — TELEPHONE ENCOUNTER
Called relayed to pt the biopsy results and scheduled pt a f/up appt. Jannet TORO for 1/22/24 but wants a sooner appt.   thanks

## 2023-11-07 NOTE — TELEPHONE ENCOUNTER
Patients GI provider:  Dr. Dr. Cory Mensah    Number to return call: [phone]    Reason for call: Pt calling asking to speak with someone regarding biopsy results.     Scheduled procedure/appointment date if applicable: N/A

## 2023-11-07 NOTE — TELEPHONE ENCOUNTER
Called relayed to pt the biopsy results and scheduled pt a f/up appt. Haydee TORO for 1/22/24 but wants a sooner appt.   thanks

## 2023-11-08 ENCOUNTER — TELEPHONE (OUTPATIENT)
Dept: OTHER | Facility: HOSPITAL | Age: 43
End: 2023-11-08

## 2023-11-08 DIAGNOSIS — R10.2 PELVIC PAIN IN FEMALE: ICD-10-CM

## 2023-11-08 DIAGNOSIS — Z90.710 S/P LAPAROSCOPIC HYSTERECTOMY: Primary | ICD-10-CM

## 2023-11-08 NOTE — TELEPHONE ENCOUNTER
Patient is constipated at this time so we will hold off on starting budesonide course.   Patient will be scheduled for CT abdomen pelvis with contrast.

## 2023-11-14 ENCOUNTER — HOSPITAL ENCOUNTER (OUTPATIENT)
Dept: RADIOLOGY | Facility: MEDICAL CENTER | Age: 43
Discharge: HOME | End: 2023-11-14
Payer: COMMERCIAL

## 2023-11-14 DIAGNOSIS — R10.2 PELVIC PAIN IN FEMALE: ICD-10-CM

## 2023-11-14 PROCEDURE — G1004 CDSM NDSC: HCPCS

## 2023-11-14 PROCEDURE — 74177 CT ABD & PELVIS W/CONTRAST: CPT

## 2023-11-14 RX ADMIN — IOHEXOL 100 ML: 350 INJECTION, SOLUTION INTRAVENOUS at 14:17

## 2023-11-22 ENCOUNTER — OFFICE VISIT (OUTPATIENT)
Dept: GASTROENTEROLOGY | Facility: CLINIC | Age: 43
End: 2023-11-22
Payer: COMMERCIAL

## 2023-11-22 VITALS
HEIGHT: 64 IN | WEIGHT: 181 LBS | BODY MASS INDEX: 30.9 KG/M2 | HEART RATE: 69 BPM | DIASTOLIC BLOOD PRESSURE: 78 MMHG | SYSTOLIC BLOOD PRESSURE: 120 MMHG | OXYGEN SATURATION: 97 %

## 2023-11-22 DIAGNOSIS — R19.8 CHANGE IN BOWEL FUNCTION: Primary | ICD-10-CM

## 2023-11-22 DIAGNOSIS — K52.839 MICROSCOPIC COLITIS, UNSPECIFIED MICROSCOPIC COLITIS TYPE: ICD-10-CM

## 2023-11-22 DIAGNOSIS — R14.0 ABDOMINAL BLOATING: ICD-10-CM

## 2023-11-22 DIAGNOSIS — R10.9 ABDOMINAL PAIN, UNSPECIFIED ABDOMINAL LOCATION: ICD-10-CM

## 2023-11-22 PROCEDURE — 99213 OFFICE O/P EST LOW 20 MIN: CPT | Performed by: PHYSICIAN ASSISTANT

## 2023-11-22 NOTE — PATIENT INSTRUCTIONS
Abdominal Pain   WHAT YOU NEED TO KNOW:   Abdominal pain can be dull, achy, or sharp. You may have pain in one area of your abdomen, or in your entire abdomen. Your pain may be caused by a condition such as constipation, food sensitivity or poisoning, infection, or a blockage. Abdominal pain can also be from a hernia, appendicitis, or an ulcer. Liver, gallbladder, or kidney conditions can also cause abdominal pain. The cause of your abdominal pain may not be known. DISCHARGE INSTRUCTIONS:   Call your local emergency number (911 in the [address]) if:   You have chest pain or shortness of breath. Return to the emergency department if:   You have pulsing pain in your upper abdomen or lower back that suddenly becomes constant. Your pain is in the right lower abdominal area and worsens with movement. You have a fever over 100.4°F (38°C) or shaking chills. You are vomiting and cannot keep food or liquids down. Your pain does not improve or gets worse over the next 8 to 12 hours. You see blood in your vomit or bowel movements, or they look black and tarry. Your skin or the whites of your eyes turn yellow. You are a woman and have a large amount of vaginal bleeding that is not your monthly period. Call your doctor if:   You have pain in your lower back. You are a man and have pain in your testicles. You have pain when you urinate. You have questions or concerns about your condition or care. Medicines: You may need any of the following:  Medicines  may be given to calm your stomach or prevent vomiting. Prescription pain medicine  may be given. Ask your healthcare provider how to take this medicine safely. Some prescription pain medicines contain acetaminophen. Do not take other medicines that contain acetaminophen without talking to your healthcare provider. Too much acetaminophen may cause liver damage. Prescription pain medicine may cause constipation.  Ask your healthcare provider how to prevent or treat constipation. Take your medicine as directed. Contact your healthcare provider if you think your medicine is not helping or if you have side effects. Tell your provider if you are allergic to any medicine. Keep a list of the medicines, vitamins, and herbs you take. Include the amounts, and when and why you take them. Bring the list or the pill bottles to follow-up visits. Carry your medicine list with you in case of an emergency. Manage or prevent abdominal pain:   Apply heat  on your abdomen for 20 to 30 minutes every 2 hours for as many days as directed. Heat helps decrease pain and muscle spasms. Make changes to the foods you eat, if needed. Do not eat foods that cause abdominal pain or other symptoms. Eat small meals more often. The following changes may also help:    Eat more high-fiber foods if you are constipated. High-fiber foods include fruits, vegetables, whole-grain foods, and legumes such as etienne beans. Do not eat foods that cause gas if you have bloating. Examples include broccoli, cabbage, beans, and carbonated drinks. Do not eat foods or drinks that contain sorbitol or fructose if you have diarrhea and bloating. Some examples are fruit juices, candy, jelly, and sugar-free gum. Do not eat high-fat foods. Examples include fried foods, cheeseburgers, hot dogs, and desserts. Make changes to the liquids you drink, if needed. Do not drink liquids that cause pain or make it worse, such as orange juice. Drink liquids throughout the day to stay hydrated. The following changes may also help:    Drink more liquids to prevent dehydration from diarrhea or vomiting. Ask your healthcare provider how much liquid to drink each day and which liquids are best for you. Limit or do not have caffeine. Caffeine may make symptoms such as heartburn or nausea worse. Limit or do not drink alcohol. Alcohol can make your abdominal pain worse.  Ask your healthcare provider if it is okay for you to drink alcohol. Also ask how much is okay for you to drink. A drink of alcohol is 12 ounces of beer, ½ ounce of liquor, or 5 ounces of wine. Keep a diary of your abdominal pain. A diary may help your healthcare provider learn what is causing your pain. Include when the pain happens, how long it lasts, and what the pain feels like. Write down any other symptoms you have with abdominal pain. Also write down what you eat, and any symptoms you have after you eat. Manage stress. Stress may cause abdominal pain. Your healthcare provider may recommend relaxation techniques and deep breathing exercises to help decrease your stress. Your healthcare provider may recommend you talk to someone about your stress or anxiety, such as a counselor or a friend. Get plenty of sleep. Exercise regularly. Do not smoke. Nicotine and other chemicals in cigarettes can damage your esophagus and stomach. Ask your healthcare provider for information if you currently smoke and need help to quit. E-cigarettes or smokeless tobacco still contain nicotine. Talk to your healthcare provider before you use these products. Follow up with your doctor as directed:  Write down your questions so you remember to ask them during your visits. © Copyright Erik Draper 2023 Information is for End User's use only and may not be sold, redistributed or otherwise used for commercial purposes. The above information is an  only. It is not intended as medical advice for individual conditions or treatments. Talk to your doctor, nurse or pharmacist before following any medical regimen to see if it is safe and effective for you.

## 2023-11-22 NOTE — PROGRESS NOTES
[facility] - Outpatient Follow-up Note  Benigno Canela 44 y.o. female MRN: [id number]  Encounter: [id number]          ASSESSMENT AND PLAN:      1. Change in bowel function  2. Abdominal bloating  3. Abdominal pain, unspecified abdominal location  4. Microscopic colitis, unspecified microscopic colitis type  Patient will begin dicyclomine 20 mg 3 times a day. Patient will hold off on starting budesonide therapy. If no response to dicyclomine we have discussed low-dose amitriptyline. Continue daily exercise and high-fiber high-protein diet.  ______________________________________________________________________    SUBJECTIVE:    52-year-old female who presents to the GI clinic today for follow-up. Since patient's last appointment she did have a colonoscopy as well as a CT scan completed. Colonoscopy was consistent with normal-appearing tissue but biopsies consistent with microscopic colitis. Patient CT scan showed no evidence of acute intra-abdominal abnormality. Patient was prescribed budesonide but we decided to hold off on starting this due to the fact the patient does not have daily diarrhea. Patient's biggest issue right now is abdominal bloating and pain. Patient has tried and failed fiber and probiotic therapy. She reports that she did not trial dicyclomine therapy that was given to her several months ago. She denies any melena or rectal bleeding. She reports her weight is stable. She is very upset about the fact that she has this chronic abdominal pain with no obvious cause. REVIEW OF SYSTEMS IS OTHERWISE NEGATIVE.       Historical Information   Past Medical History:   Diagnosis Date    Abdominal pain     Anesthesia     pt reports "after hand surgery the next day had a rash from neck down to shoulder"    Exercises 5 to 6 times per week     Wears contact lenses     will wear glasses DOS     Past Surgical History:   Procedure Laterality Date    HYSTERECTOMY IN LAPAROSCOPY W/LYSIS OF ADHESIONS N/A 11/16/2021    Procedure: DIAGNOSTIC LAPAROSCOPY, Left Cyst Wall biopsy;  Surgeon: Too Holley MD;  Location: [facility];  Service: Gynecology    IN LAPS TOTAL HYSTERECT 250 GM/< W/RMVL TUBE/OVARY Bilateral 11/15/2022    Procedure: HYSTERECTOMY LAPAROSCOPIC TOTAL WITH BILATERAL SALPINGO-OOPHERECTOMY;  Surgeon: Too Holley MD;  Location: [facility];  Service: Gynecology    IN [address] SING[address] BONE Right 11/05/2019    Procedure: RIGHT HAND CAPSULOTOMY EXTENSOR TENOLYSIS 5TH METACARPAL;  Surgeon: Zack Koyanagi, MD;  Location: [facility];  Service: Orthopedics     Social History   Social History     Substance and Sexual Activity   Alcohol Use Yes    Alcohol/week: 3.0 - 4.0 standard drinks of alcohol    Types: 3 - 4 Standard drinks or equivalent per week    Comment: weekly      Social History     Substance and Sexual Activity   Drug Use No     Social History     Tobacco Use   Smoking Status Never   Smokeless Tobacco Never     Family History   Problem Relation Age of Onset    No Known Problems Mother     No Known Problems Father     Diabetes Family     Heart disease Family     No Known Problems Sister     No Known Problems Maternal Grandmother     No Known Problems Maternal Grandfather     No Known Problems Paternal Grandmother     No Known Problems Paternal Grandfather     No Known Problems Son     No Known Problems Sister     No Known Problems Maternal Aunt     No Known Problems Maternal Aunt     No Known Problems Maternal Uncle     No Known Problems Maternal Uncle     No Known Problems Paternal Aunt     No Known Problems Paternal Uncle     No Known Problems Paternal Uncle     No Known Problems Paternal Uncle     Breast cancer Neg Hx     Colon cancer Neg Hx     Ovarian cancer Neg Hx        Meds/Allergies       Current Outpatient Medications:     Ascorbic Acid (vitamin C) 100 MG tablet    B Complex Vitamins (VITAMIN B COMPLEX PO) budesonide (ENTOCORT EC) 3 MG capsule    cholecalciferol (VITAMIN D3) 400 units tablet    estradiol (ESTRACE) 1 mg tablet    Omega-3 Fatty Acids (Fish Oil) 1000 MG CPDR    Probiotic Product (PRO-BIOTIC BLEND PO)    Turmeric 1053 MG TABS    Zinc Sulfate (ZINC 15 PO)    No Known Allergies        Objective     Blood pressure 120/78, pulse 69, height 5' 4" (1.626 m), weight 82.1 kg (181 lb), last menstrual period 11/14/2022, SpO2 97 %, not currently breastfeeding. Body mass index is 31.07 kg/m². PHYSICAL EXAM:      General Appearance:   Alert, cooperative, no distress   HEENT:   Normocephalic, atraumatic, anicteric. Neck:  Supple, symmetrical, trachea midline   Lungs:   Clear to auscultation bilaterally; no rales, rhonchi or wheezing; respirations unlabored    Heart[de-identified]   Regular rate and rhythm; no murmur, rub, or gallop. Abdomen:   Soft, non-tender, non-distended; normal bowel sounds; no masses, no organomegaly    Genitalia:   Deferred    Rectal:   Deferred    Extremities:  No cyanosis, clubbing or edema    Pulses:  2+ and symmetric    Skin:  No jaundice, rashes, or lesions    Lymph nodes:  No palpable cervical lymphadenopathy        Lab Results:   No visits with results within 1 Day(s) from this visit.    Latest known visit with results is:   Hospital Outpatient Visit on 10/17/2023   Component Date Value    Case Report 10/17/2023                      Value:Surgical Pathology Report                         Case: [id number]                                   Authorizing Provider:  Tc Campos DO          Collected:           10/17/2023 1343              Ordering Location:      [facility]       Received:            10/17/2023 [address] [place] [address] Endoscopy                                                             Pathologist:           Georgina Lee MD                                                     Specimen:    Colon, random Final Diagnosis 10/17/2023                      Value: This result contains rich text formatting which cannot be displayed here. Additional Information 10/17/2023                      Value: This result contains rich text formatting which cannot be displayed here. Gross Description 10/17/2023                      Value: This result contains rich text formatting which cannot be displayed here. Clinical Information 10/17/2023                      Value:Cold bx r/o microscopic colitis         Radiology Results:   CT abdomen pelvis w contrast    Result Date: 11/20/2023  Narrative: CT ABDOMEN AND PELVIS WITH IV CONTRAST INDICATION:   R10.2: Pelvic and perineal pain. Prior NOY in 2022. COMPARISON: Abdomen and pelvis ultrasound 4/20/2023 TECHNIQUE:  CT examination of the abdomen and pelvis was performed. Multiplanar 2D reformatted images were created from the source data. This examination, like all CT scans performed in the [facility], was performed utilizing techniques to minimize radiation dose exposure, including the use of iterative reconstruction and automated exposure control. Radiation dose length product (DLP) for this visit:  473 mGy-cm IV Contrast:  100 mL of iohexol (OMNIPAQUE) Enteric Contrast:  Enteric contrast was administered. FINDINGS: ABDOMEN LOWER CHEST:  No clinically significant abnormality identified in the visualized lower chest. LIVER/BILIARY TREE:  One or more subcentimeter sharply circumscribed low-density hepatic lesion(s) are noted (series 2 image 32), too small to accurately characterize, but statistically most likely to represent subcentimeter hepatic cysts. No suspicious  solid hepatic lesion is identified. Hepatic contours are normal.  No biliary dilatation. GALLBLADDER:  No calcified gallstones. No pericholecystic inflammatory change. SPLEEN:  Unremarkable. PANCREAS:  Unremarkable. ADRENAL GLANDS:  Unremarkable. KIDNEYS/URETERS:  Unremarkable. No hydronephrosis. STOMACH AND BOWEL:  Unremarkable. APPENDIX: Normal. ABDOMINOPELVIC CAVITY:  No ascites. No pneumoperitoneum. No lymphadenopathy. VESSELS:  Unremarkable for patient's age. PELVIS REPRODUCTIVE ORGANS: Hysterectomy. No suspicious pelvic mass. URINARY BLADDER:  Unremarkable. ABDOMINAL WALL/INGUINAL REGIONS:  Unremarkable. OSSEOUS STRUCTURES: No acute fracture or destructive osseous lesion. Mild spinal degenerative changes. Impression: No acute findings in the abdomen or pelvis. Resident: Jolinda Phoenix, the attending radiologist, have reviewed the images and agree with the final report above.  Workstation performed: [id number]     Answers submitted by the patient for this visit:  Abdominal Pain Questionnaire (Submitted on 11/21/2023)  Chief Complaint: Abdominal pain  Chronicity: chronic  Onset: more than 1 year ago  Onset quality: undetermined  Frequency: daily  Progression since onset: waxing and waning  Pain location: suprapubic region, right flank  Pain - numeric: 7/10  Pain quality: cramping, a sensation of fullness  Radiates to: suprapubic region, back, right flank, pelvis  anorexia: No  arthralgias: No  belching: No  constipation: Yes  diarrhea: Yes  dysuria: No  fever: No  flatus: No  frequency: No  headaches: Yes  hematochezia: No  melena: No  myalgias: Yes  nausea: Yes  weight loss: No  vomiting: No  Aggravated by: certain positions  Relieved by: recumbency  Diagnostic workup: CT scan, lower endoscopy, ultrasound

## 2023-11-30 DIAGNOSIS — K52.832 LYMPHOCYTIC COLITIS: ICD-10-CM

## 2023-11-30 RX ORDER — BUDESONIDE 3 MG/1
CAPSULE, COATED PELLETS ORAL
Qty: 270 CAPSULE | Refills: 1 | Status: SHIPPED | OUTPATIENT
Start: 2023-11-30

## 2023-12-07 DIAGNOSIS — R10.2 PELVIC PAIN IN FEMALE: Primary | ICD-10-CM

## 2023-12-07 RX ORDER — DICYCLOMINE HCL 20 MG
20 TABLET ORAL EVERY 6 HOURS
Qty: 60 TABLET | Refills: 0 | Status: SHIPPED | OUTPATIENT
Start: 2023-12-07

## 2023-12-16 DIAGNOSIS — R10.2 PELVIC PAIN IN FEMALE: ICD-10-CM

## 2023-12-18 RX ORDER — DICYCLOMINE HCL 20 MG
20 TABLET ORAL EVERY 6 HOURS
Qty: 360 TABLET | Refills: 1 | Status: SHIPPED | OUTPATIENT
Start: 2023-12-18

## 2024-04-01 ENCOUNTER — HOSPITAL ENCOUNTER (OUTPATIENT)
Dept: RADIOLOGY | Facility: MEDICAL CENTER | Age: 44
Discharge: HOME | End: 2024-04-01
Payer: COMMERCIAL

## 2024-04-01 VITALS — WEIGHT: 165 LBS | BODY MASS INDEX: 28.17 KG/M2 | HEIGHT: 64 IN

## 2024-04-01 DIAGNOSIS — Z12.31 ENCOUNTER FOR SCREENING MAMMOGRAM FOR MALIGNANT NEOPLASM OF BREAST: ICD-10-CM

## 2024-04-01 PROCEDURE — 77063 BREAST TOMOSYNTHESIS BI: CPT

## 2024-04-01 PROCEDURE — 77067 SCR MAMMO BI INCL CAD: CPT

## 2024-09-09 ENCOUNTER — ANNUAL EXAM (OUTPATIENT)
Dept: OBGYN CLINIC | Facility: CLINIC | Age: 44
End: 2024-09-09
Payer: COMMERCIAL

## 2024-09-09 VITALS
DIASTOLIC BLOOD PRESSURE: 66 MMHG | HEART RATE: 62 BPM | BODY MASS INDEX: 27.59 KG/M2 | OXYGEN SATURATION: 97 % | HEIGHT: 64 IN | SYSTOLIC BLOOD PRESSURE: 124 MMHG | WEIGHT: 161.6 LBS

## 2024-09-09 DIAGNOSIS — Z12.31 ENCOUNTER FOR SCREENING MAMMOGRAM FOR MALIGNANT NEOPLASM OF BREAST: ICD-10-CM

## 2024-09-09 DIAGNOSIS — Z01.419 ENCOUNTER FOR GYNECOLOGICAL EXAMINATION: ICD-10-CM

## 2024-09-09 DIAGNOSIS — Z01.419 ENCOUNTER FOR GYNECOLOGICAL EXAMINATION (GENERAL) (ROUTINE) WITHOUT ABNORMAL FINDINGS: Primary | ICD-10-CM

## 2024-09-09 PROCEDURE — S0612 ANNUAL GYNECOLOGICAL EXAMINA: HCPCS | Performed by: OBSTETRICS & GYNECOLOGY

## 2024-09-09 NOTE — PROGRESS NOTES
Assessment/Plan:   Encounter for gynecological examination  Pap/HPV not indicated  Mammogram ordered    Has lost 20lbs since last visit. Has revamped diet and training. Doing great  Hot flashes still occurring daily.  Unclear if estrogen is helping.  May decide to stop this medication. Offered change of dose or addition of progesterone.     Encourage healthy diet, exercise, Calcium 1200mg per day and at least 800 iu Vitamin D daily.       Diagnoses and all orders for this visit:    Encounter for gynecological examination (general) (routine) without abnormal findings    Encounter for screening mammogram for malignant neoplasm of breast  -     Mammo screening bilateral w 3d and cad; Future    Encounter for gynecological examination          Subjective:     Patient ID: Deisy Alvares is a 43 y.o. female.    Patient presents for a routine annual visit  Hysterectomy  Mammogram- 25     Non smoker  Social drinker  Currently sexually active  No family history of uterine, ovarian, cervical or breast cancer.   No concerns/questions for today's visit     Gynecologic Exam  She reports no genital itching, genital lesions, genital odor, genital rash, pelvic pain, vaginal bleeding or vaginal discharge. The patient is experiencing no pain. Pertinent negatives include no chills, constipation, diarrhea, fever, nausea, sore throat or vomiting.       Review of Systems   Constitutional:  Negative for activity change, appetite change, chills, fatigue and fever.   HENT:  Negative for rhinorrhea, sneezing and sore throat.    Eyes:  Negative for visual disturbance.   Respiratory:  Negative for cough, shortness of breath and wheezing.    Cardiovascular:  Negative for chest pain, palpitations and leg swelling.   Gastrointestinal:  Negative for abdominal distention, constipation, diarrhea, nausea and vomiting.   Genitourinary:  Negative for difficulty urinating, pelvic pain and vaginal discharge.   Neurological:  Negative for  syncope and light-headedness.         Objective:     Physical Exam  Constitutional:       General: She is not in acute distress.     Appearance: Normal appearance. She is well-developed. She is not diaphoretic.   Chest:   Breasts:     Breasts are symmetrical.      Right: No inverted nipple, mass, nipple discharge, skin change or tenderness.      Left: No inverted nipple, mass, nipple discharge, skin change or tenderness.   Abdominal:      Palpations: Abdomen is soft. Abdomen is not rigid.      Tenderness: There is no abdominal tenderness. There is no guarding or rebound.      Hernia: There is no hernia in the left inguinal area or right inguinal area.   Genitourinary:     Pubic Area: No rash.       Labia:         Right: No rash, tenderness, lesion or injury.         Left: No rash, tenderness, lesion or injury.       Urethra: No prolapse, urethral pain, urethral swelling or urethral lesion.      Vagina: No vaginal discharge, erythema, tenderness, bleeding or prolapsed vaginal walls.      Adnexa:         Right: No mass, tenderness or fullness.          Left: No mass, tenderness or fullness.        Comments: Urethral meatus: no lesions, non tender  Urethra: non tender    Skin:     General: Skin is warm and dry.      Coloration: Skin is not pale.      Findings: No erythema or rash.

## 2024-11-12 DIAGNOSIS — E89.40 PREMATURE SURGICAL MENOPAUSE: ICD-10-CM

## 2024-11-13 RX ORDER — ESTRADIOL 1 MG/1
1 TABLET ORAL DAILY
Qty: 90 TABLET | Refills: 1 | Status: SHIPPED | OUTPATIENT
Start: 2024-11-13

## 2025-01-31 NOTE — PROGRESS NOTES
CHIEF COMPLAINT:  Chief Complaint   Patient presents with    Right Hand - Follow-up       SUBJECTIVE:  Kris Rizzo is a 41y o  year old  female who presents to the office for a folloow up apt for left 5th metacarpal fracture that occurred on 08/09/19  Thad Evans was seen in the office on 08/12/19 where she was placed into a ulnar gutter cast and a closed reduction was performed  Ulna gutter cast was removed on 9/3/19 and she was transitioned to a custom ulna gutter splint  Pt states that she has been removing splint at home and trying to use normally  Pt states that she does continue splint in public  Pt states that she has pain when pressure is putr on her small finger  Pt has been attending therapy  PAST MEDICAL HISTORY:  Past Medical History:   Diagnosis Date    No known health problems        PAST SURGICAL HISTORY:  Past Surgical History:   Procedure Laterality Date    NO PAST SURGERIES         FAMILY HISTORY:  Family History   Problem Relation Age of Onset    No Known Problems Mother     No Known Problems Father     Diabetes Family     Heart disease Family     Breast cancer Neg Hx     Colon cancer Neg Hx     Ovarian cancer Neg Hx        SOCIAL HISTORY:  Social History     Tobacco Use    Smoking status: Never Smoker    Smokeless tobacco: Never Used   Substance Use Topics    Alcohol use: Yes     Comment: Social     Drug use: No       MEDICATIONS:    Current Outpatient Medications:     Ibuprofen (ADVIL PO), Take by mouth, Disp: , Rfl:     ALLERGIES:  No Known Allergies    REVIEW OF SYSTEMS:  Review of Systems   Constitutional: Negative for chills, fever and unexpected weight change  HENT: Negative for hearing loss, nosebleeds and sore throat  Eyes: Negative for pain, redness and visual disturbance  Respiratory: Negative for cough, shortness of breath and wheezing  Cardiovascular: Negative for chest pain, palpitations and leg swelling     Gastrointestinal: Negative for abdominal Juli care done, clean pads and gown provided. Ice pack applied vaginally for comfort.    pain, nausea and vomiting  Endocrine: Negative for polydipsia and polyuria  Genitourinary: Negative for dysuria and hematuria  Skin: Negative for rash and wound  Neurological: Negative for dizziness and headaches  Psychiatric/Behavioral: Negative for decreased concentration, dysphoric mood and suicidal ideas  The patient is not nervous/anxious  VITALS:  Vitals:    09/23/19 0907   BP: 107/68   Pulse: 64       LABS:  HgA1c: No results found for: HGBA1C  BMP: No results found for: GLUCOSE, CALCIUM, NA, K, CO2, CL, BUN, CREATININE    _____________________________________________________  PHYSICAL EXAMINATION:  General: well developed and well nourished, alert, oriented times 3 and appears comfortable  Psychiatric: Normal  HEENT: Trachea Midline, No torticollis  Pulmonary: No audible wheezing or respiratory distress   Skin: No masses, erythema, lacerations, fluctation, ulcerations  Neurovascular: Sensation Intact to the Median, Ulnar, Radial Nerve, Motor Intact to the Median, Ulnar, Radial Nerve and Pulses Intact    MUSCULOSKELETAL EXAMINATION:  Right hand  No swelling erythema or ecchymosis  Tender to palpation of the small finger  Limited composite fist due to stiffness with pain in small and ring fingers      ___________________________________________________  STUDIES REVIEWED:  Images obtained today of the right hand 3 views demonstrate continued stable alignment of the angulated slightly displaced right 5th metacarpal neck fracture       PROCEDURES PERFORMED:  Procedures  No Procedures performed today    _____________________________________________________  ASSESSMENT/PLAN:    Angulated fifth metacarpal neck fracture sustained on 8/9/19  * Pt was advised to ween out of splint as she feels comfortable  * Pt was advised to work on motion at home and in therapy      Follow Up:  Return in about 3 weeks (around 10/14/2019)      Work/school status:  None needed     To Do Next Visit:  Re-evaluation of current issue      Scribe Attestation    I,:   Kelvin Merino am acting as a scribe while in the presence of the attending physician :        I,:   Onofre Farris MD personally performed the services described in this documentation    as scribed in my presence :

## 2025-02-24 NOTE — PROGRESS NOTES
Erx signed. thanks   SUBJECTIVE:  Aldo Torres is a 46y o  year old female who presents for follow up after surgery, right hand 5th MCP joint contracture release including capsulotomy, osteophyte excision of the 5th metacarpal and extensor tenolysis performed on  11/5/2019  Today patient has been going to physical therapy to work on her range of motion however she has significant stiffness at the MCP joint  During the operation she was able to achieve full flexion  She states she does have some discomfort when trying to move her hand  She denies any numbness or tingling  VITALS:  Vitals:    11/14/19 0852   BP: 111/74   Pulse: 63       PHYSICAL EXAMINATION:  General: well developed and well nourished, alert, oriented times 3 and appears comfortable  Psychiatric: Normal    MUSCULOSKELETAL EXAMINATION:  Right small finger  Incision: Clean, dry, with sutures intact  Range of Motion:  Significant decreased range of motion at the MCP joint  Neurovascular status: Neuro intact, good cap refill      STUDIES REVIEWED:  No studies reviewed  PROCEDURES PERFORMED:  Procedures  No Procedures performed today      ASSESSMENT/PLAN:    S/P right hand 5th MCP joint contracture release including capsulotomy, extensor tenolysis, osteophyte excision 5th metacarpal  Done today: Sutures out  - patient was instructed to be very aggressive when working on her range of motion  - she was encouraged to work on her range of motion even when at home and constantly trying to make a fist  - she will follow up with us in 3 weeks for re-evaluation of her range of motion     FOLLOW UP:  Return in about 3 weeks (around 12/5/2019)      Work/school status:  No restrictions      TO DO AT NEXT VISIT:  Re-evaluation of current issue      Scribe Attestation    I,:   Be Rosado PA-C am acting as a scribe while in the presence of the attending physician :        I,:   Carmelo Boyer MD personally performed the services described in this documentation    as scribed in my presence :

## 2025-04-07 ENCOUNTER — HOSPITAL ENCOUNTER (OUTPATIENT)
Dept: RADIOLOGY | Facility: MEDICAL CENTER | Age: 45
Discharge: HOME | End: 2025-04-07
Payer: COMMERCIAL

## 2025-04-07 VITALS — HEIGHT: 64 IN | WEIGHT: 161 LBS | BODY MASS INDEX: 27.49 KG/M2

## 2025-04-07 DIAGNOSIS — Z12.31 ENCOUNTER FOR SCREENING MAMMOGRAM FOR MALIGNANT NEOPLASM OF BREAST: ICD-10-CM

## 2025-04-07 PROCEDURE — 77067 SCR MAMMO BI INCL CAD: CPT

## 2025-04-07 PROCEDURE — 77063 BREAST TOMOSYNTHESIS BI: CPT

## 2025-04-25 ENCOUNTER — APPOINTMENT (OUTPATIENT)
Dept: RADIOLOGY | Facility: MEDICAL CENTER | Age: 45
End: 2025-04-25
Attending: PHYSICIAN ASSISTANT
Payer: COMMERCIAL

## 2025-04-25 ENCOUNTER — OFFICE VISIT (OUTPATIENT)
Dept: URGENT CARE | Facility: MEDICAL CENTER | Age: 45
End: 2025-04-25
Payer: COMMERCIAL

## 2025-04-25 VITALS
OXYGEN SATURATION: 99 % | DIASTOLIC BLOOD PRESSURE: 70 MMHG | WEIGHT: 162 LBS | SYSTOLIC BLOOD PRESSURE: 120 MMHG | RESPIRATION RATE: 16 BRPM | HEART RATE: 75 BPM | BODY MASS INDEX: 27.81 KG/M2 | TEMPERATURE: 97.8 F

## 2025-04-25 DIAGNOSIS — S62.635A CLOSED DISPLACED FRACTURE OF DISTAL PHALANX OF LEFT RING FINGER, INITIAL ENCOUNTER: Primary | ICD-10-CM

## 2025-04-25 PROCEDURE — S9083 URGENT CARE CENTER GLOBAL: HCPCS | Performed by: PHYSICIAN ASSISTANT

## 2025-04-25 PROCEDURE — 73140 X-RAY EXAM OF FINGER(S): CPT

## 2025-04-25 PROCEDURE — G0382 LEV 3 HOSP TYPE B ED VISIT: HCPCS | Performed by: PHYSICIAN ASSISTANT

## 2025-04-25 NOTE — PATIENT INSTRUCTIONS
"Wear the finger splint and follow-up with orthopedics.    Patient Education     Finger fracture   The Basics   Written by the doctors and editors at [facility]   What is a finger fracture? -- A \"fracture\" is another word for a broken bone. The finger bones are also called the \"phalanges\" (figure 1).  There are different types of fractures, depending on which bone breaks and how it breaks. When a bone breaks, it might crack, break all of the way through, or shatter.  Fractured fingers can happen if a finger is hit, twisted, or bent too far.  What are the symptoms of a finger fracture? -- Symptoms depend on which bone breaks and the kind of break it is. Common symptoms can include:   Pain, swelling, or bruising over the area   Finger looks bent in an abnormal position or is not the usual shape   Not being able to bend or move the finger   Trouble making a fist or grasping things with the fingers   Numbness in the area of the broken bone  Is there a test for a finger fracture? -- Yes. Your doctor or nurse will ask about your symptoms, do an exam, and do an X-ray. They might do other imaging tests, such as a CT, MRI, or ultrasound. Imaging tests create pictures of the inside of the body.  How are finger fractures treated? -- Treatment depends, in part, on the type of finger fracture you have and how severe it is. The goal of treatment is to have the ends of the broken bone line up with each other so that the bone can heal.  If the ends of the broken bone are already in line with each other, finger fractures are usually treated with a splint, \"john taping,\" or both. John taping involves taping your injured finger to the finger next to it (picture 1).  If the ends of your broken bone are not in line with each other, the doctor will need to line them up:   Sometimes, the doctor can move the bone to the correct position without doing surgery, and then put a splint on or john tape your fingers. This is called \"closed " "fracture reduction.\"   A severe finger fracture, in which a joint is damaged or the bones do not stay in position, is treated with surgery. During surgery, the doctor puts the finger bone back in position. To do this, they can use screws, pins, rods, or plates to fix the bones inside the finger. This is called \"open fracture reduction.\"  How long do finger fractures take to heal? -- Most finger fractures take weeks to months to heal, depending on the type of fracture. The doctor or nurse will talk to you about when to return to things like work, sports, or other activities.  Healing time also depends on the person. Healthy children usually heal much more quickly than older adults or adults with other medical problems.  How do I care for myself at home? -- To care for yourself or your child at home:   Follow the doctor's instructions for wearing the splint or john taping the finger. This supports and protects the bone as it heals.   Follow instructions for limiting activity and movement until the bone is healed. The doctor or nurse will tell you what activities are safe to do.   Prop the injured hand on pillows, keeping it above the level of the heart. This might help lessen pain and swelling.   The doctor might recommend an over-the-counter pain medicine. These include acetaminophen (sample brand name: Tylenol), ibuprofen (sample brand names: Advil, Motrin), and naproxen (sample brand name: Aleve).   Some people get a prescription for stronger pain medicines to take for a short time. Follow the instructions for taking these medicines.   Ice can help with pain and swelling:   Put a cold gel pack, bag of ice, or bag of frozen vegetables on the injured area every 1 to 2 hours, for 15 minutes each time. Put a thin towel between the ice (or other cold object) and your skin.   Use the ice (or other cold object) for at least 6 hours after your injury. Some people find it helpful to ice longer, even up to 2 days after " "their injury.   Eat a healthy diet that includes plenty of calcium, vitamin D, and protein (figure 2).   If you smoke, try to quit. Broken bones take longer to heal if you smoke.   You might need to work with a physical therapist (exercise expert) after your fracture heals. The physical therapist will show you exercises and stretches to strengthen the hand and finger muscles and keep them from getting stiff.  When should I call the doctor? -- Call for advice if:   There is less feeling or movement in your fingers.   The finger becomes swollen or starts to hurt more.   The splint becomes too tight and uncomfortable.   The fingers are numb or tingly, or turn pale, blue, or gray.  All topics are updated as new evidence becomes available and our peer review process is complete.  This topic retrieved from [employer] on: Feb 26, 2024.  Topic 08593 Version 11.0  Release: 32.2.4 - C32.56  © 2024 UpToDate, Inc. and/or its affiliates. All rights reserved.  figure 1: Hand and wrist bones     This drawing shows the bones of the hand and wrist.  Graphic 89271 Version 2.0  picture 1: Finger john taping     Finger john taping involves taping an injured finger to the finger next to it.  Graphic 38253 Version 2.0  figure 2: Foods and drinks with calcium and vitamin D     Foods rich in calcium include ice cream, soy milk, breads, kale, broccoli, milk, cheese, cottage cheese, almonds, yogurt, ready-to-eat cereals, beans, and tofu. Foods rich in vitamin D include milk, fortified plant-based \"milks\" (soy, almond), canned tuna fish, cod liver oil, yogurt, ready-to-eat-cereals, cooked salmon, canned sardines, mackerel, and eggs. Some of these foods are rich in both.  Graphic 38720 Version 4.0  Consumer Information Use and Disclaimer   Disclaimer: This generalized information is a limited summary of diagnosis, treatment, and/or medication information. It is not meant to be comprehensive and should be used as a tool to help the user " understand and/or assess potential diagnostic and treatment options. It does NOT include all information about conditions, treatments, medications, side effects, or risks that may apply to a specific patient. It is not intended to be medical advice or a substitute for the medical advice, diagnosis, or treatment of a health care provider based on the health care provider's examination and assessment of a patient's specific and unique circumstances. Patients must speak with a health care provider for complete information about their health, medical questions, and treatment options, including any risks or benefits regarding use of medications. This information does not endorse any treatments or medications as safe, effective, or approved for treating a specific patient. UpToDate, Inc. and its affiliates disclaim any warranty or liability relating to this information or the use thereof.The use of this information is governed by the Terms of Use, available at https://www.[employer].com/en/know/clinical-effectiveness-terms. 2024© UpToDate, Inc. and its affiliates and/or licensors. All rights reserved.  Copyright   © 2024 UpToDate, Inc. and/or its affiliates. All rights reserved.

## 2025-04-25 NOTE — PROGRESS NOTES
"  [facility]        NAME: Deisy Alvares is a 44 y.o. female  : 1980    MRN: [id number]  DATE: 2025  TIME: 12:20 PM    Assessment and Plan   Closed displaced fracture of distal phalanx of left ring finger, initial encounter [S62.635A]  1. Closed displaced fracture of distal phalanx of left ring finger, initial encounter  XR finger left fourth digit-ring    XR finger left fourth digit-ring    Ambulatory referral to Orthopedic Surgery        Finger fracture.  Patient referred to orthopedics.  Patient placed in a finger splint.    Patient Instructions     Patient Instructions   Wear the finger splint and follow-up with orthopedics.    Patient Education     Finger fracture   The Basics   Written by the doctors and editors at [facility]   What is a finger fracture? -- A \"fracture\" is another word for a broken bone. The finger bones are also called the \"phalanges\" (figure 1).  There are different types of fractures, depending on which bone breaks and how it breaks. When a bone breaks, it might crack, break all of the way through, or shatter.  Fractured fingers can happen if a finger is hit, twisted, or bent too far.  What are the symptoms of a finger fracture? -- Symptoms depend on which bone breaks and the kind of break it is. Common symptoms can include:   Pain, swelling, or bruising over the area   Finger looks bent in an abnormal position or is not the usual shape   Not being able to bend or move the finger   Trouble making a fist or grasping things with the fingers   Numbness in the area of the broken bone  Is there a test for a finger fracture? -- Yes. Your doctor or nurse will ask about your symptoms, do an exam, and do an X-ray. They might do other imaging tests, such as a CT, MRI, or ultrasound. Imaging tests create pictures of the inside of the body.  How are finger fractures treated? -- Treatment depends, in part, on the type of finger fracture you have and how severe it is. The goal " "of treatment is to have the ends of the broken bone line up with each other so that the bone can heal.  If the ends of the broken bone are already in line with each other, finger fractures are usually treated with a splint, \"john taping,\" or both. John taping involves taping your injured finger to the finger next to it (picture 1).  If the ends of your broken bone are not in line with each other, the doctor will need to line them up:   Sometimes, the doctor can move the bone to the correct position without doing surgery, and then put a splint on or john tape your fingers. This is called \"closed fracture reduction.\"   A severe finger fracture, in which a joint is damaged or the bones do not stay in position, is treated with surgery. During surgery, the doctor puts the finger bone back in position. To do this, they can use screws, pins, rods, or plates to fix the bones inside the finger. This is called \"open fracture reduction.\"  How long do finger fractures take to heal? -- Most finger fractures take weeks to months to heal, depending on the type of fracture. The doctor or nurse will talk to you about when to return to things like work, sports, or other activities.  Healing time also depends on the person. Healthy children usually heal much more quickly than older adults or adults with other medical problems.  How do I care for myself at home? -- To care for yourself or your child at home:   Follow the doctor's instructions for wearing the splint or john taping the finger. This supports and protects the bone as it heals.   Follow instructions for limiting activity and movement until the bone is healed. The doctor or nurse will tell you what activities are safe to do.   Prop the injured hand on pillows, keeping it above the level of the heart. This might help lessen pain and swelling.   The doctor might recommend an over-the-counter pain medicine. These include acetaminophen (sample brand name: Tylenol), ibuprofen " (sample brand names: Advil, Motrin), and naproxen (sample brand name: Aleve).   Some people get a prescription for stronger pain medicines to take for a short time. Follow the instructions for taking these medicines.   Ice can help with pain and swelling:   Put a cold gel pack, bag of ice, or bag of frozen vegetables on the injured area every 1 to 2 hours, for 15 minutes each time. Put a thin towel between the ice (or other cold object) and your skin.   Use the ice (or other cold object) for at least 6 hours after your injury. Some people find it helpful to ice longer, even up to 2 days after their injury.   Eat a healthy diet that includes plenty of calcium, vitamin D, and protein (figure 2).   If you smoke, try to quit. Broken bones take longer to heal if you smoke.   You might need to work with a physical therapist (exercise expert) after your fracture heals. The physical therapist will show you exercises and stretches to strengthen the hand and finger muscles and keep them from getting stiff.  When should I call the doctor? -- Call for advice if:   There is less feeling or movement in your fingers.   The finger becomes swollen or starts to hurt more.   The splint becomes too tight and uncomfortable.   The fingers are numb or tingly, or turn pale, blue, or gray.  All topics are updated as new evidence becomes available and our peer review process is complete.  This topic retrieved from [employer] on: Feb 26, 2024.  Topic 99787 Version 11.0  Release: 32.2.4 - C32.56  © 2024 UpToDate, Inc. and/or its affiliates. All rights reserved.  figure 1: Hand and wrist bones     This drawing shows the bones of the hand and wrist.  Graphic 99916 Version 2.0  picture 1: Finger john taping     Finger john taping involves taping an injured finger to the finger next to it.  Graphic 70707 Version 2.0  figure 2: Foods and drinks with calcium and vitamin D     Foods rich in calcium include ice cream, soy milk, breads, kale, broccoli,  "milk, cheese, cottage cheese, almonds, yogurt, ready-to-eat cereals, beans, and tofu. Foods rich in vitamin D include milk, fortified plant-based \"milks\" (soy, almond), canned tuna fish, cod liver oil, yogurt, ready-to-eat-cereals, cooked salmon, canned sardines, mackerel, and eggs. Some of these foods are rich in both.  Graphic 76705 Version 4.0  Consumer Information Use and Disclaimer   Disclaimer: This generalized information is a limited summary of diagnosis, treatment, and/or medication information. It is not meant to be comprehensive and should be used as a tool to help the user understand and/or assess potential diagnostic and treatment options. It does NOT include all information about conditions, treatments, medications, side effects, or risks that may apply to a specific patient. It is not intended to be medical advice or a substitute for the medical advice, diagnosis, or treatment of a health care provider based on the health care provider's examination and assessment of a patient's specific and unique circumstances. Patients must speak with a health care provider for complete information about their health, medical questions, and treatment options, including any risks or benefits regarding use of medications. This information does not endorse any treatments or medications as safe, effective, or approved for treating a specific patient. UpToDate, Inc. and its affiliates disclaim any warranty or liability relating to this information or the use thereof.The use of this information is governed by the Terms of Use, available at https://www.[employer].com/en/know/clinical-effectiveness-terms. 2024© UpToDate, Inc. and its affiliates and/or licensors. All rights reserved.  Copyright   © 2024 UpToDate, Inc. and/or its affiliates. All rights reserved.        Follow up with PCP in 3-5 days.  Proceed to  ER if symptoms worsen.    Chief Complaint     Chief Complaint   Patient presents with    Hand Pain     4 weeks " ago jammed 4th finger left hand playing football. Tender when tingling. Some limited ROM, mild swelling. Not taking anything for pain.          History of Present Illness       The patient is a 44 year old female presenting to [facility] with a swollen left 4th finger. The patient states that she was playing football 4 weeks ago and the ball hit her finger. She denies hearing a crack or pop. She states the distal joint on her left 4th finger was originally bruised and swollen but it subsided after a week after icing it. The patient states that the pain is worsening.  She talked to a family member who is a PA who recommended she get x-rays.  Is right-hand dominant.    Hand Pain   Associated symptoms include numbness (decreased sensation of L distal 4th finger). Pertinent negatives include no chest pain.       Review of Systems   Review of Systems   Constitutional:  Negative for chills and fever.   Respiratory:  Negative for cough and shortness of breath.    Cardiovascular:  Negative for chest pain and palpitations.   Musculoskeletal:  Positive for joint swelling.        Swelling and pain of L 4th finger    Skin:  Positive for color change. Negative for wound.   Neurological:  Positive for numbness (decreased sensation of L distal 4th finger).         Current Medications       Current Outpatient Medications:     Ascorbic Acid (vitamin C) 100 MG tablet, Take 100 mg by mouth daily, Disp: , Rfl:     B Complex Vitamins (VITAMIN B COMPLEX PO), Take by mouth, Disp: , Rfl:     estradiol (ESTRACE) 1 mg tablet, TAKE 1 TABLET BY MOUTH EVERY DAY, Disp: 90 tablet, Rfl: 1    Omega-3 Fatty Acids (Fish Oil) 1000 MG CPDR, Take by mouth, Disp: , Rfl:     Probiotic Product (PRO-BIOTIC BLEND PO), Take by mouth, Disp: , Rfl:     Turmeric 1053 MG TABS, Take by mouth, Disp: , Rfl:     Zinc Sulfate (ZINC 15 PO), Take by mouth, Disp: , Rfl:     budesonide (ENTOCORT EC) 3 MG capsule, TAKE 3 CAPSULES DAILY FOR 4 WEEKS, 2 CAPSULES DAILY FOR 2  "WEEKS, 1 CAPSULE DAILY FOR 2 WEEKS., Disp: 270 capsule, Rfl: 1    cholecalciferol (VITAMIN D3) 400 units tablet, Take 400 Units by mouth daily, Disp: , Rfl:     dicyclomine (BENTYL) 20 mg tablet, TAKE 1 TABLET BY MOUTH EVERY 6 HOURS., Disp: 360 tablet, Rfl: 1    Current Allergies     Allergies as of 04/25/2025    (No Known Allergies)            The following portions of the patient's history were reviewed and updated as appropriate: allergies, current medications, past family history, past medical history, past social history, past surgical history and problem list.     Past Medical History:   Diagnosis Date    Abdominal pain     Anesthesia     pt reports \"after hand surgery the next day had a rash from neck down to shoulder\"    Exercises 5 to 6 times per week     Wears contact lenses     will wear glasses DOS       Past Surgical History:   Procedure Laterality Date    HYSTERECTOMY      NC LAPAROSCOPY W/LYSIS OF ADHESIONS N/A 11/16/2021    Procedure: DIAGNOSTIC LAPAROSCOPY, Left Cyst Wall biopsy;  Surgeon: Shakila Busch MD;  Location: [facility];  Service: Gynecology    NC LAPS TOTAL HYSTERECT 250 GM/< W/RMVL TUBE/OVARY Bilateral 11/15/2022    Procedure: HYSTERECTOMY LAPAROSCOPIC TOTAL WITH BILATERAL SALPINGO-OOPHERECTOMY;  Surgeon: Shakila Busch MD;  Location: [facility];  Service: Gynecology    NC OPEN TX METACARPAL FRACTURE SINGLE EA BONE Right 11/05/2019    Procedure: RIGHT HAND CAPSULOTOMY EXTENSOR TENOLYSIS 5TH METACARPAL;  Surgeon: Jose Alejandro Gao MD;  Location: [facility];  Service: Orthopedics       Family History   Problem Relation Age of Onset    No Known Problems Mother     No Known Problems Father     No Known Problems Sister     No Known Problems Sister     No Known Problems Maternal Grandmother     No Known Problems Maternal Grandfather     No Known Problems Paternal Grandmother     No Known Problems Paternal Grandfather     No Known Problems Son     No Known Problems Maternal Aunt     " No Known Problems Maternal Aunt     No Known Problems Maternal Uncle     No Known Problems Maternal Uncle     No Known Problems Paternal Aunt     No Known Problems Paternal Uncle     No Known Problems Paternal Uncle     No Known Problems Paternal Uncle     Breast cancer Neg Hx     Colon cancer Neg Hx     Ovarian cancer Neg Hx          Medications have been verified.        Objective   /70   Pulse 75   Temp 97.8 °F (36.6 °C)   Resp 16   Wt 73.5 kg (162 lb)   LMP 11/14/2022   SpO2 99%   BMI 27.81 kg/m²        Physical Exam     Physical Exam  Constitutional:       General: She is not in acute distress.     Appearance: Normal appearance. She is normal weight. She is not ill-appearing, toxic-appearing or diaphoretic.   Cardiovascular:      Rate and Rhythm: Normal rate and regular rhythm.      Heart sounds:      No friction rub.   Pulmonary:      Effort: Pulmonary effort is normal.      Breath sounds: Normal breath sounds. No stridor. No wheezing, rhonchi or rales.   Musculoskeletal:         General: Swelling, tenderness, deformity and signs of injury present.      Comments: Decreased sensation on fingertip of L 4th finger. +swelling, decreased ROM, tenderness at DIP of L 4th finger   Neurological:      Mental Status: She is alert.

## 2025-05-01 ENCOUNTER — OFFICE VISIT (OUTPATIENT)
Dept: OCCUPATIONAL THERAPY | Facility: CLINIC | Age: 45
End: 2025-05-01
Payer: COMMERCIAL

## 2025-05-01 ENCOUNTER — OFFICE VISIT (OUTPATIENT)
Dept: OBGYN CLINIC | Facility: CLINIC | Age: 45
End: 2025-05-01
Attending: PHYSICIAN ASSISTANT
Payer: COMMERCIAL

## 2025-05-01 ENCOUNTER — APPOINTMENT (OUTPATIENT)
Dept: RADIOLOGY | Facility: AMBULARY SURGERY CENTER | Age: 45
End: 2025-05-01
Attending: STUDENT IN AN ORGANIZED HEALTH CARE EDUCATION/TRAINING PROGRAM
Payer: COMMERCIAL

## 2025-05-01 VITALS — BODY MASS INDEX: 27.66 KG/M2 | WEIGHT: 162 LBS | HEIGHT: 64 IN

## 2025-05-01 DIAGNOSIS — S62.635A CLOSED DISPLACED FRACTURE OF DISTAL PHALANX OF LEFT RING FINGER, INITIAL ENCOUNTER: ICD-10-CM

## 2025-05-01 DIAGNOSIS — M20.012 MALLET FINGER OF LEFT HAND: Primary | ICD-10-CM

## 2025-05-01 PROCEDURE — L3933 FO W/O JOINTS CF: HCPCS | Performed by: OCCUPATIONAL THERAPIST

## 2025-05-01 PROCEDURE — 73140 X-RAY EXAM OF FINGER(S): CPT

## 2025-05-01 PROCEDURE — 99204 OFFICE O/P NEW MOD 45 MIN: CPT | Performed by: STUDENT IN AN ORGANIZED HEALTH CARE EDUCATION/TRAINING PROGRAM

## 2025-05-01 NOTE — PROGRESS NOTES
Orthotic fabrication     Diagnosis:   Mallet finger of left hand, M20,012  Indication: Motion Blocking    Location: Left  ring finger  Supplies: Custom Fit Orthotic x2  Splint type: Mallet/DIP Blocking  Wearing Schedule: Remove with Protected Technique Only as Needed, demonstrated today.  Describe Position: DIP slight hyperextension, PIP free.     Precautions: Universal (skin contact/breakdown)    Patient or Caregiver expresses understanding of wearing Schedule and Precautions? Yes  Patient or Caregiver able to don/doff orthotic independently?Yes    Written orders provided to patient? Yes  Orders Obtained: Written  Orders Obtained from: Donnell    Return for evaluation and treatment No

## 2025-05-01 NOTE — PROGRESS NOTES
ORTHOPAEDIC HAND, WRIST, AND ELBOW OFFICE  VISIT      ASSESSMENT/PLAN:      Assessment & Plan  Mallet finger of left hand  X-rays were reviewed in the office today. The patient does have a 10 degree extensor lag on exam. Discussed non operative versus surgical intervention. The patient elected to proceed with non operative treatment options. The patient was fitted and provided with a finger splint in the office today. A referral was provided to OT for a custom splint. She was instructed to wear the splint full time. Discussed to not allow the finger to droop down when donning and doffing splint. Discussed possibility of extensor lag or nonunion.  Orders:    Ambulatory referral to Orthopedic Surgery    XR finger left fourth digit-ring; Future    Ambulatory Referral to PT/OT Hand Therapy; Future          Follow Up:  3 weeks       To Do Next Visit:  X-rays of the  left  ring finger in splint good lateral of the DIP joint      Discussions:  Mallet Finger: The anatomy and physiology of a mallet finger was discussed with the patient today.  Typically, the extensor tendon is torn off of the dorsal aspect of the distal phalanx.  This results in a flexed posture of the distal interphalangeal joint, with incomplete extension (ie. A drooped finger).  Typically this is treated through conservative measures.  An extension block splint is worn over the distal interphalangeal joint for 6-8 weeks continuously, followed by 4-6 weeks of nocturnal use.  After healing, there is typically a small flexion deformity at the distal interphalangeal joint.  Surgery does not typically change these results.       Sen Jacobo MD  Attending, Orthopaedic Surgery  Hand, Wrist, and Elbow Surgery  [facility]    ______________________________________________________________________________________________    CHIEF COMPLAINT:  Chief Complaint   Patient presents with    Left Ring Finger - Pain, Tingling  "      SUBJECTIVE:  Patient is a 44 y.o. RHD female who presents today for evaluation and treatment of left ring finger pain. The patient states 5 weeks ago she jammed her finger while playing football. She notes pain at the IP joint. She presented to urgent care on 4/25/25 where x-rays were taken. She has been using a finger splint but has been taking it off at night. She is not taking anything OTC for pain.      Occupation: not working      I have personally reviewed all the relevant PMH, PSH, SH, FH, Medications and allergies      PAST MEDICAL HISTORY:  Past Medical History:   Diagnosis Date    Abdominal pain     Anesthesia     pt reports \"after hand surgery the next day had a rash from neck down to shoulder\"    Exercises 5 to 6 times per week     Wears contact lenses     will wear glasses DOS       PAST SURGICAL HISTORY:  Past Surgical History:   Procedure Laterality Date    HYSTERECTOMY      OH LAPAROSCOPY W/LYSIS OF ADHESIONS N/A 11/16/2021    Procedure: DIAGNOSTIC LAPAROSCOPY, Left Cyst Wall biopsy;  Surgeon: Shakila Busch MD;  [facility];  Service: Gynecology    OH LAPS TOTAL HYSTERECT 250 GM/< W/RMVL TUBE/OVARY Bilateral 11/15/2022    Procedure: HYSTERECTOMY LAPAROSCOPIC TOTAL WITH BILATERAL SALPINGO-OOPHERECTOMY;  Surgeon: Shakila Busch MD;  [facility];  Service: Gynecology    OH OPEN TX METACARPAL FRACTURE SINGLE EA BONE Right 11/05/2019    Procedure: RIGHT HAND CAPSULOTOMY EXTENSOR TENOLYSIS 5TH METACARPAL;  Surgeon: Jose Alejandro Gao MD;  [facility];  Service: Orthopedics       FAMILY HISTORY:  Family History   Problem Relation Age of Onset    No Known Problems Mother     No Known Problems Father     No Known Problems Sister     No Known Problems Sister     No Known Problems Maternal Grandmother     No Known Problems Maternal Grandfather     No Known Problems Paternal Grandmother     No Known Problems Paternal Grandfather     No Known Problems Son     No Known " Problems Maternal Aunt     No Known Problems Maternal Aunt     No Known Problems Maternal Uncle     No Known Problems Maternal Uncle     No Known Problems Paternal Aunt     No Known Problems Paternal Uncle     No Known Problems Paternal Uncle     No Known Problems Paternal Uncle     Breast cancer Neg Hx     Colon cancer Neg Hx     Ovarian cancer Neg Hx        SOCIAL HISTORY:  Social History     Tobacco Use    Smoking status: Never    Smokeless tobacco: Never   Vaping Use    Vaping status: Never Used   Substance Use Topics    Alcohol use: Not Currently     Alcohol/week: 2.0 standard drinks of alcohol     Types: 2 Glasses of wine per week     Comment: weekly     Drug use: No       MEDICATIONS:    Current Outpatient Medications:     Ascorbic Acid (vitamin C) 100 MG tablet, Take 100 mg by mouth daily, Disp: , Rfl:     B Complex Vitamins (VITAMIN B COMPLEX PO), Take by mouth, Disp: , Rfl:     estradiol (ESTRACE) 1 mg tablet, TAKE 1 TABLET BY MOUTH EVERY DAY, Disp: 90 tablet, Rfl: 1    Omega-3 Fatty Acids (Fish Oil) 1000 MG CPDR, Take by mouth, Disp: , Rfl:     Probiotic Product (PRO-BIOTIC BLEND PO), Take by mouth, Disp: , Rfl:     Turmeric 1053 MG TABS, Take by mouth, Disp: , Rfl:     Zinc Sulfate (ZINC 15 PO), Take by mouth, Disp: , Rfl:     budesonide (ENTOCORT EC) 3 MG capsule, TAKE 3 CAPSULES DAILY FOR 4 WEEKS, 2 CAPSULES DAILY FOR 2 WEEKS, 1 CAPSULE DAILY FOR 2 WEEKS., Disp: 270 capsule, Rfl: 1    cholecalciferol (VITAMIN D3) 400 units tablet, Take 400 Units by mouth daily, Disp: , Rfl:     dicyclomine (BENTYL) 20 mg tablet, TAKE 1 TABLET BY MOUTH EVERY 6 HOURS., Disp: 360 tablet, Rfl: 1    ALLERGIES:  No Known Allergies        REVIEW OF SYSTEMS:  Musculoskeletal:        As noted in HPI.   All other systems reviewed and are negative.    VITALS:  There were no vitals filed for this visit.    LABS:  HgA1c:   Lab Results   Component Value Date    HGBA1C 5.3 10/27/2022     BMP:   Lab Results   Component Value Date     CALCIUM 9.5 03/30/2023    K 4.1 03/30/2023    CO2 28 03/30/2023     03/30/2023    BUN 14 03/30/2023    CREATININE 0.89 03/30/2023       _____________________________________________________  PHYSICAL EXAMINATION:  General: Well developed and well nourished, alert & oriented x 3, appears comfortable  Psychiatric: Normal  HEENT: Normocephalic, Atraumatic Trachea Midline, No torticollis  Pulmonary: No audible wheezing or respiratory distress   Abdomen/GI: Non tender, non distended   Cardiovascular: No pitting edema, 2+ radial pulse   Skin: No masses, erythema, lacerations, fluctation, ulcerations  Neurovascular: Sensation Intact to the Median, Ulnar, Radial Nerve, Motor Intact to the Median, Ulnar, Radial Nerve, and Pulses Intact  Musculoskeletal: Normal, except as noted in detailed exam and in HPI.      MUSCULOSKELETAL EXAMINATION:  Left ring finger  DIP ext lag 10 degrees  DIP flexion 40  Mild swelling around DIP joint  Good PIP and MCP extension and flexion  ___________________________________________________  STUDIES REVIEWED:  Xrays of the left ring finger were reviewed and independently interpreted in PACS by Dr. Jacobo and demonstrate bony mallet with gap between fracture fragments. No evidence of joint subluxation. Post splint application with decreased gap between fracture fragments.            PROCEDURES PERFORMED:  Procedures  No Procedures performed today    _____________________________________________________      Scribe Attestation      I,:  Enriqueta Mueller MA am acting as a scribe while in the presence of the attending physician.:       I,:  Sen Jacobo MD personally performed the services described in this documentation    as scribed in my presence.:

## 2025-05-12 DIAGNOSIS — E89.40 PREMATURE SURGICAL MENOPAUSE: ICD-10-CM

## 2025-05-12 RX ORDER — ESTRADIOL 1 MG/1
1 TABLET ORAL DAILY
Qty: 90 TABLET | Refills: 0 | Status: SHIPPED | OUTPATIENT
Start: 2025-05-12

## 2025-05-12 RX ORDER — ESTRADIOL 1 MG/1
1 TABLET ORAL DAILY
Qty: 90 TABLET | Refills: 1 | OUTPATIENT
Start: 2025-05-12

## 2025-05-20 ENCOUNTER — OFFICE VISIT (OUTPATIENT)
Dept: OBGYN CLINIC | Facility: CLINIC | Age: 45
End: 2025-05-20
Payer: COMMERCIAL

## 2025-05-20 ENCOUNTER — APPOINTMENT (OUTPATIENT)
Dept: RADIOLOGY | Facility: AMBULARY SURGERY CENTER | Age: 45
End: 2025-05-20
Attending: STUDENT IN AN ORGANIZED HEALTH CARE EDUCATION/TRAINING PROGRAM
Payer: COMMERCIAL

## 2025-05-20 VITALS — WEIGHT: 158.2 LBS | BODY MASS INDEX: 27.15 KG/M2

## 2025-05-20 DIAGNOSIS — M20.012 MALLET FINGER OF LEFT HAND: ICD-10-CM

## 2025-05-20 DIAGNOSIS — M20.012 MALLET FINGER OF LEFT HAND: Primary | ICD-10-CM

## 2025-05-20 PROCEDURE — 99213 OFFICE O/P EST LOW 20 MIN: CPT | Performed by: STUDENT IN AN ORGANIZED HEALTH CARE EDUCATION/TRAINING PROGRAM

## 2025-05-20 PROCEDURE — 73140 X-RAY EXAM OF FINGER(S): CPT

## 2025-05-20 NOTE — PROGRESS NOTES
"ORTHOPAEDIC HAND, WRIST, AND ELBOW OFFICE  VISIT      ASSESSMENT/PLAN:      Assessment & Plan  Mallet finger of left hand  Left ring mallet finger. X-rays were reviewed in the office today. Discussed continue full time splinting for another 3 weeks.   Orders:    XR finger left fourth digit-ring; Future            Follow Up:  3 weeks       To Do Next Visit:  Splint off and X-rays of the  left  ring finger, good lateral of DIP joint        Sen Jacobo MD  Attending, Orthopaedic Surgery  Hand, Wrist, and Elbow Surgery  [facility]    ______________________________________________________________________________________________    CHIEF COMPLAINT:  No chief complaint on file.      SUBJECTIVE:  Patient is a 44 y.o. RHD female who presents today for follow up of left ring finger mallet sustained approx 8 weeks ago. She has been full time splinting since 5/1/25. The patient states she is doing well. She has been using the splint full time. She states she has only been removing the splint to shower.      Occupation: not working      I have personally reviewed all the relevant PMH, PSH, SH, FH, Medications and allergies      PAST MEDICAL HISTORY:  Past Medical History:   Diagnosis Date    Abdominal pain     Anesthesia     pt reports \"after hand surgery the next day had a rash from neck down to shoulder\"    Exercises 5 to 6 times per week     Wears contact lenses     will wear glasses DOS       PAST SURGICAL HISTORY:  Past Surgical History:   Procedure Laterality Date    HYSTERECTOMY      CA LAPAROSCOPY W/LYSIS OF ADHESIONS N/A 11/16/2021    Procedure: DIAGNOSTIC LAPAROSCOPY, Left Cyst Wall biopsy;  Surgeon: Shakila Busch MD;  [facility];  Service: Gynecology    CA LAPS TOTAL HYSTERECT 250 GM/< W/RMVL TUBE/OVARY Bilateral 11/15/2022    Procedure: HYSTERECTOMY LAPAROSCOPIC TOTAL WITH BILATERAL SALPINGO-OOPHERECTOMY;  Surgeon: Shakila Busch MD;  [facility];  " Service: Gynecology    SD OPEN TX METACARPAL FRACTURE SINGLE EA BONE Right 11/05/2019    Procedure: RIGHT HAND CAPSULOTOMY EXTENSOR TENOLYSIS 5TH METACARPAL;  Surgeon: Jose Alejandro Gao MD;  [facility];  Service: Orthopedics       FAMILY HISTORY:  Family History   Problem Relation Age of Onset    No Known Problems Mother     No Known Problems Father     No Known Problems Sister     No Known Problems Sister     No Known Problems Maternal Grandmother     No Known Problems Maternal Grandfather     No Known Problems Paternal Grandmother     No Known Problems Paternal Grandfather     No Known Problems Son     No Known Problems Maternal Aunt     No Known Problems Maternal Aunt     No Known Problems Maternal Uncle     No Known Problems Maternal Uncle     No Known Problems Paternal Aunt     No Known Problems Paternal Uncle     No Known Problems Paternal Uncle     No Known Problems Paternal Uncle     Breast cancer Neg Hx     Colon cancer Neg Hx     Ovarian cancer Neg Hx        SOCIAL HISTORY:  Social History[1]    MEDICATIONS:  Current Medications[2]    ALLERGIES:  Allergies[3]        REVIEW OF SYSTEMS:  Musculoskeletal:        As noted in HPI.   All other systems reviewed and are negative.    VITALS:  There were no vitals filed for this visit.    LABS:  HgA1c:   Lab Results   Component Value Date    HGBA1C 5.3 10/27/2022     BMP:   Lab Results   Component Value Date    CALCIUM 9.5 03/30/2023    K 4.1 03/30/2023    CO2 28 03/30/2023     03/30/2023    BUN 14 03/30/2023    CREATININE 0.89 03/30/2023       _____________________________________________________  PHYSICAL EXAMINATION:  General: Well developed and well nourished, alert & oriented x 3, appears comfortable  Psychiatric: Normal  HEENT: Normocephalic, Atraumatic Trachea Midline, No torticollis  Pulmonary: No audible wheezing or respiratory distress   Abdomen/GI: Non tender, non distended   Cardiovascular: No pitting edema, 2+ radial pulse   Skin: No  masses, erythema, lacerations, fluctation, ulcerations  Neurovascular: Sensation Intact to the Median, Ulnar, Radial Nerve, Motor Intact to the Median, Ulnar, Radial Nerve, and Pulses Intact  Musculoskeletal: Normal, except as noted in detailed exam and in HPI.      MUSCULOSKELETAL EXAMINATION:  Left ring finger  Mallet splint intact  Skin that is visible appears good.  ___________________________________________________  STUDIES REVIEWED:  Xrays of the left ring finger were reviewed and independently interpreted in PACS by Dr. Jacobo and demonstrate left ring mallet fracture.         PROCEDURES PERFORMED:  Procedures  No Procedures performed today    _____________________________________________________      Scribe Attestation      I,:  Enriqueta Mueller MA am acting as a scribe while in the presence of the attending physician.:       I,:  Sen Jacobo MD personally performed the services described in this documentation    as scribed in my presence.:                [1]   Social History  Tobacco Use    Smoking status: Never    Smokeless tobacco: Never   Vaping Use    Vaping status: Never Used   Substance Use Topics    Alcohol use: Not Currently     Alcohol/week: 2.0 standard drinks of alcohol     Types: 2 Glasses of wine per week     Comment: weekly     Drug use: No   [2]   Current Outpatient Medications:     Ascorbic Acid (vitamin C) 100 MG tablet, Take 100 mg by mouth in the morning., Disp: , Rfl:     B Complex Vitamins (VITAMIN B COMPLEX PO), Take by mouth, Disp: , Rfl:     estradiol (ESTRACE) 1 mg tablet, Take 1 tablet (1 mg total) by mouth daily, Disp: 90 tablet, Rfl: 0    Omega-3 Fatty Acids (Fish Oil) 1000 MG CPDR, Take by mouth, Disp: , Rfl:     Probiotic Product (PRO-BIOTIC BLEND PO), Take by mouth, Disp: , Rfl:     Turmeric 1053 MG TABS, Take by mouth, Disp: , Rfl:     Zinc Sulfate (ZINC 15 PO), Take by mouth, Disp: , Rfl:     budesonide (ENTOCORT EC) 3 MG capsule, TAKE 3 CAPSULES DAILY FOR 4  WEEKS, 2 CAPSULES DAILY FOR 2 WEEKS, 1 CAPSULE DAILY FOR 2 WEEKS., Disp: 270 capsule, Rfl: 1    cholecalciferol (VITAMIN D3) 400 units tablet, Take 400 Units by mouth daily, Disp: , Rfl:     dicyclomine (BENTYL) 20 mg tablet, TAKE 1 TABLET BY MOUTH EVERY 6 HOURS., Disp: 360 tablet, Rfl: 1  [3] No Known Allergies

## 2025-06-09 ENCOUNTER — TELEPHONE (OUTPATIENT)
Dept: PODIATRY | Facility: CLINIC | Age: 45
End: 2025-06-09

## 2025-06-09 NOTE — TELEPHONE ENCOUNTER
Called pt left voicemail , provider will be unavailable  at the time of pt appt pt will need to reschedule.

## 2025-06-10 ENCOUNTER — APPOINTMENT (OUTPATIENT)
Dept: RADIOLOGY | Facility: AMBULARY SURGERY CENTER | Age: 45
End: 2025-06-10
Attending: STUDENT IN AN ORGANIZED HEALTH CARE EDUCATION/TRAINING PROGRAM
Payer: COMMERCIAL

## 2025-06-10 ENCOUNTER — OFFICE VISIT (OUTPATIENT)
Dept: OBGYN CLINIC | Facility: CLINIC | Age: 45
End: 2025-06-10
Payer: COMMERCIAL

## 2025-06-10 DIAGNOSIS — M20.012 MALLET FINGER OF LEFT HAND: Primary | ICD-10-CM

## 2025-06-10 DIAGNOSIS — M20.012 MALLET FINGER OF LEFT HAND: ICD-10-CM

## 2025-06-10 PROCEDURE — 73140 X-RAY EXAM OF FINGER(S): CPT

## 2025-06-10 PROCEDURE — 99213 OFFICE O/P EST LOW 20 MIN: CPT | Performed by: STUDENT IN AN ORGANIZED HEALTH CARE EDUCATION/TRAINING PROGRAM

## 2025-06-10 NOTE — PROGRESS NOTES
ORTHOPAEDIC HAND, WRIST, AND ELBOW OFFICE  VISIT      ASSESSMENT/PLAN:      Assessment & Plan  Mallet finger of left hand   left ring finger mallet sustained approx 11 weeks ago. The patient has been full time splinting since 5/1/25. The patient is doing well. X-rays were reviewed which demonstrate healing. Advised to continue with night time splinting for the next 6 weeks. No heavy lifting for the next 3 weeks.   Orders:    XR finger left fourth digit-ring; Future          Follow Up:  6 weeks       To Do Next Visit:  X-rays of the  left  ring finger          Sen Jacobo MD  Attending, Orthopaedic Surgery  Hand, Wrist, and Elbow Surgery  [facility]    ______________________________________________________________________________________________    CHIEF COMPLAINT:  Chief Complaint   Patient presents with    Left Ring Finger - Pain, Tingling, Follow-up       SUBJECTIVE:  Patient is a 44 y.o. RHD female who presents today for follow up of left ring finger mallet sustained approx 11 weeks ago. The patient has been full time splinting since 5/1/25. The patient states she is doing well. She has been compliant with the splint.      Occupation: not working      I have personally reviewed all the relevant PMH, PSH, SH, FH, Medications and allergies      PAST MEDICAL HISTORY:  Past Medical History[1]    PAST SURGICAL HISTORY:  Past Surgical History[2]    FAMILY HISTORY:  Family History[3]    SOCIAL HISTORY:  Social History[4]    MEDICATIONS:  Current Medications[5]    ALLERGIES:  Allergies[6]        REVIEW OF SYSTEMS:  Musculoskeletal:        As noted in HPI.   All other systems reviewed and are negative.    VITALS:  There were no vitals filed for this visit.    LABS:  HgA1c:   Lab Results   Component Value Date    HGBA1C 5.3 10/27/2022     BMP:   Lab Results   Component Value Date    CALCIUM 9.5 03/30/2023    K 4.1 03/30/2023    CO2 28 03/30/2023     03/30/2023    BUN 14 03/30/2023     "CREATININE 0.89 03/30/2023       _____________________________________________________  PHYSICAL EXAMINATION:  General: Well developed and well nourished, alert & oriented x 3, appears comfortable  Psychiatric: Normal  HEENT: Normocephalic, Atraumatic Trachea Midline, No torticollis  Pulmonary: No audible wheezing or respiratory distress   Abdomen/GI: Non tender, non distended   Cardiovascular: No pitting edema, 2+ radial pulse   Skin: No masses, erythema, lacerations, fluctation, ulcerations  Neurovascular: Sensation Intact to the Median, Ulnar, Radial Nerve, Motor Intact to the Median, Ulnar, Radial Nerve, and Pulses Intact  Musculoskeletal: Normal, except as noted in detailed exam and in HPI.      MUSCULOSKELETAL EXAMINATION:  Left ring finger  PIP flexion 30  Full digit ext  Mild TTP fx site  ___________________________________________________  STUDIES REVIEWED:  Xrays of the left ring finger were reviewed and independently interpreted in PACS by Dr. Jacobo and demonstrate healing mallet fracture.          PROCEDURES PERFORMED:  Procedures  No Procedures performed today    _____________________________________________________      Scribe Attestation      I,:  Enriqueta Mueller MA am acting as a scribe while in the presence of the attending physician.:       I,:  Sen Jacobo MD personally performed the services described in this documentation    as scribed in my presence.:                [1]   Past Medical History:  Diagnosis Date    Abdominal pain     Anesthesia     pt reports \"after hand surgery the next day had a rash from neck down to shoulder\"    Exercises 5 to 6 times per week     Wears contact lenses     will wear glasses DOS   [2]   Past Surgical History:  Procedure Laterality Date    HYSTERECTOMY      MI LAPAROSCOPY W/LYSIS OF ADHESIONS N/A 11/16/2021    Procedure: DIAGNOSTIC LAPAROSCOPY, Left Cyst Wall biopsy;  Surgeon: Shakila Busch MD;  [facility];  Service: Gynecology    " ID LAPS TOTAL HYSTERECT 250 GM/< W/RMVL TUBE/OVARY Bilateral 11/15/2022    Procedure: HYSTERECTOMY LAPAROSCOPIC TOTAL WITH BILATERAL SALPINGO-OOPHERECTOMY;  Surgeon: Shakila Busch MD;  [facility];  Service: Gynecology    ID OPEN TX METACARPAL FRACTURE SINGLE EA BONE Right 11/05/2019    Procedure: RIGHT HAND CAPSULOTOMY EXTENSOR TENOLYSIS 5TH METACARPAL;  Surgeon: Jose Alejandro Gao MD;  [facility];  Service: Orthopedics   [3]   Family History  Problem Relation Name Age of Onset    No Known Problems Mother      No Known Problems Father      No Known Problems Sister      No Known Problems Sister      No Known Problems Maternal Grandmother      No Known Problems Maternal Grandfather      No Known Problems Paternal Grandmother      No Known Problems Paternal Grandfather      No Known Problems Son      No Known Problems Maternal Aunt      No Known Problems Maternal Aunt      No Known Problems Maternal Uncle      No Known Problems Maternal Uncle      No Known Problems Paternal Aunt      No Known Problems Paternal Uncle      No Known Problems Paternal Uncle      No Known Problems Paternal Uncle      Breast cancer Neg Hx      Colon cancer Neg Hx      Ovarian cancer Neg Hx     [4]   Social History  Tobacco Use    Smoking status: Never    Smokeless tobacco: Never   Vaping Use    Vaping status: Never Used   Substance Use Topics    Alcohol use: Not Currently     Alcohol/week: 2.0 standard drinks of alcohol     Types: 2 Glasses of wine per week     Comment: weekly     Drug use: No   [5]   Current Outpatient Medications:     Ascorbic Acid (vitamin C) 100 MG tablet, Take 100 mg by mouth in the morning., Disp: , Rfl:     B Complex Vitamins (VITAMIN B COMPLEX PO), Take by mouth, Disp: , Rfl:     estradiol (ESTRACE) 1 mg tablet, Take 1 tablet (1 mg total) by mouth daily, Disp: 90 tablet, Rfl: 0    Omega-3 Fatty Acids (Fish Oil) 1000 MG CPDR, Take by mouth, Disp: , Rfl:     Probiotic Product (PRO-BIOTIC BLEND  PO), Take by mouth, Disp: , Rfl:     Turmeric 1053 MG TABS, Take by mouth, Disp: , Rfl:     Zinc Sulfate (ZINC 15 PO), Take by mouth, Disp: , Rfl:     budesonide (ENTOCORT EC) 3 MG capsule, TAKE 3 CAPSULES DAILY FOR 4 WEEKS, 2 CAPSULES DAILY FOR 2 WEEKS, 1 CAPSULE DAILY FOR 2 WEEKS., Disp: 270 capsule, Rfl: 1    cholecalciferol (VITAMIN D3) 400 units tablet, Take 400 Units by mouth daily, Disp: , Rfl:     dicyclomine (BENTYL) 20 mg tablet, TAKE 1 TABLET BY MOUTH EVERY 6 HOURS., Disp: 360 tablet, Rfl: 1  [6] No Known Allergies

## 2025-08-07 DIAGNOSIS — E89.40 PREMATURE SURGICAL MENOPAUSE: ICD-10-CM

## 2025-08-07 RX ORDER — ESTRADIOL 1 MG/1
1 TABLET ORAL DAILY
Qty: 90 TABLET | Refills: 1 | Status: SHIPPED | OUTPATIENT
Start: 2025-08-07

## 2025-08-20 ENCOUNTER — OFFICE VISIT (OUTPATIENT)
Dept: URGENT CARE | Facility: MEDICAL CENTER | Age: 45
End: 2025-08-20
Payer: COMMERCIAL

## 2025-08-20 VITALS
DIASTOLIC BLOOD PRESSURE: 70 MMHG | WEIGHT: 161.8 LBS | SYSTOLIC BLOOD PRESSURE: 120 MMHG | TEMPERATURE: 97.6 F | HEIGHT: 64 IN | OXYGEN SATURATION: 97 % | RESPIRATION RATE: 16 BRPM | BODY MASS INDEX: 27.62 KG/M2 | HEART RATE: 77 BPM

## 2025-08-20 DIAGNOSIS — J02.9 SORE THROAT: Primary | ICD-10-CM

## 2025-08-20 LAB — S PYO AG THROAT QL: NEGATIVE

## 2025-08-20 PROCEDURE — G0382 LEV 3 HOSP TYPE B ED VISIT: HCPCS | Performed by: PHYSICIAN ASSISTANT

## 2025-08-20 PROCEDURE — 87880 STREP A ASSAY W/OPTIC: CPT | Performed by: PHYSICIAN ASSISTANT

## 2025-08-20 PROCEDURE — 87070 CULTURE OTHR SPECIMN AEROBIC: CPT | Performed by: PHYSICIAN ASSISTANT

## 2025-08-20 PROCEDURE — S9083 URGENT CARE CENTER GLOBAL: HCPCS | Performed by: PHYSICIAN ASSISTANT

## 2025-08-20 RX ORDER — PREDNISONE 20 MG/1
20 TABLET ORAL DAILY
Qty: 5 TABLET | Refills: 0 | Status: SHIPPED | OUTPATIENT
Start: 2025-08-20 | End: 2025-08-25

## 2025-08-22 LAB — BACTERIA THROAT CULT: NORMAL

## (undated) DEVICE — ADHESIVE SKIN HIGH VISCOSITY EXOFIN 1ML

## (undated) DEVICE — TROCAR: Brand: KII FIOS FIRST ENTRY

## (undated) DEVICE — GLOVE SRG BIOGEL 8

## (undated) DEVICE — DRAPE EQUIPMENT RF WAND

## (undated) DEVICE — ARM SLING: Brand: DEROYAL

## (undated) DEVICE — ENDOPATH 5MM CURVED SCISSORS WITH MONOPOLAR CAUTERY: Brand: ENDOPATH

## (undated) DEVICE — SUT MONOCRYL 4-0 PS-2 18 IN Y496G

## (undated) DEVICE — SUT ETHIBOND 3-0 RB-1 30 IN MX552

## (undated) DEVICE — INTENDED FOR TISSUE SEPARATION, AND OTHER PROCEDURES THAT REQUIRE A SHARP SURGICAL BLADE TO PUNCTURE OR CUT.: Brand: BARD-PARKER ® CARBON RIB-BACK BLADES

## (undated) DEVICE — IRRIG ENDO FLO TUBING

## (undated) DEVICE — PENCIL ELECTROSURG E-Z CLEAN -0035H

## (undated) DEVICE — STERILE BETHLEHEM PLASTIC HAND: Brand: CARDINAL HEALTH

## (undated) DEVICE — TUBING SMOKE EVAC W/FILTRATION DEVICE PLUMEPORT ACTIV

## (undated) DEVICE — CURITY NON-ADHERENT STRIPS: Brand: CURITY

## (undated) DEVICE — SPLINT 4 X 15 IN FAST SET PLASTER

## (undated) DEVICE — MAYO STAND COVER: Brand: CONVERTORS

## (undated) DEVICE — SPONGE SCRUB 4 PCT CHLORHEXIDINE

## (undated) DEVICE — TRAY FOLEY 16FR URIMETER SILICONE SURESTEP

## (undated) DEVICE — GAUZE SPONGES,16 PLY: Brand: CURITY

## (undated) DEVICE — PAD CAST 3 IN COTTON NON STRL

## (undated) DEVICE — CHLORAPREP HI-LITE 26ML ORANGE

## (undated) DEVICE — SCD SEQUENTIAL COMPRESSION COMFORT SLEEVE MEDIUM KNEE LENGTH: Brand: KENDALL SCD

## (undated) DEVICE — LIGHT HANDLE COVER SLEEVE DISP BLUE STELLAR

## (undated) DEVICE — TUBING SUCTION 5MM X 12 FT

## (undated) DEVICE — SUT ETHILON 4-0 PS-2 18 IN 1667H

## (undated) DEVICE — SPONGE 4 X 4 XRAY 16 PLY STRL LF RFD

## (undated) DEVICE — ACE WRAP 3 IN UNSTERILE

## (undated) DEVICE — INTENDED FOR TISSUE SEPARATION, AND OTHER PROCEDURES THAT REQUIRE A SHARP SURGICAL BLADE TO PUNCTURE OR CUT.: Brand: BARD-PARKER SAFETY BLADES SIZE 11, STERILE

## (undated) DEVICE — TROCAR: Brand: KII® SLEEVE

## (undated) DEVICE — [HIGH FLOW INSUFFLATOR,  DO NOT USE IF PACKAGE IS DAMAGED,  KEEP DRY,  KEEP AWAY FROM SUNLIGHT,  PROTECT FROM HEAT AND RADIOACTIVE SOURCES.]: Brand: PNEUMOSURE

## (undated) DEVICE — CAUTERY TIP POLISHER: Brand: DEVON

## (undated) DEVICE — 40583 XL ADVANCED TRENDELENBURG POSITIONING KIT: Brand: 40583 XL ADVANCED TRENDELENBURG POSITIONING KIT

## (undated) DEVICE — K-WIRE DIAMOND POINT BOTH ENDS                                    .062 X 5
Type: IMPLANTABLE DEVICE | Status: NON-FUNCTIONAL
Removed: 2019-11-05

## (undated) DEVICE — 1820 FOAM BLOCK NEEDLE COUNTER: Brand: DEVON

## (undated) DEVICE — TIBURON LAPAROSCOPIC ABDOMINAL DRAPE: Brand: CONVERTORS

## (undated) DEVICE — PVC URETHRAL CATHETER: Brand: DOVER

## (undated) DEVICE — BETHLEHEM UNIVERSAL GYN LAP PK: Brand: CARDINAL HEALTH

## (undated) DEVICE — TOWEL SET X-RAY

## (undated) DEVICE — STRETCH BANDAGE: Brand: CURITY

## (undated) DEVICE — HEAVY DUTY TABLE COVER: Brand: CONVERTORS

## (undated) DEVICE — GLOVE INDICATOR PI UNDERGLOVE SZ 6.5 BLUE

## (undated) DEVICE — CHLORHEXIDINE 4PCT 4 OZ

## (undated) DEVICE — SYRINGE 50ML LL

## (undated) DEVICE — SPONGE LAP 18 X 18 IN

## (undated) DEVICE — ELECTRODE LAP SPATULA CRV E-Z CLEAN 33CM -0018C

## (undated) DEVICE — ENDO STITCH DEVICE 10 MM

## (undated) DEVICE — BANDAGE, ESMARK LF STR 6"X9' (20/CS): Brand: CYPRESS

## (undated) DEVICE — HARMONIC ACE 5MM DIAMETER SHEARS 36CM SHAFT LENGTH + ADAPTIVE TISSUE TECHNOLOGY FOR USE WITH GENERATOR G11: Brand: HARMONIC ACE

## (undated) DEVICE — DRAPE KIT C-ARM W/PLATE PRTC FOOT SWITCH COVER

## (undated) DEVICE — OCCLUDER COLPO-PNEUMO

## (undated) DEVICE — UTERINE MANIPULATOR RUMI 6.7 X 8 CM